# Patient Record
Sex: MALE | Race: WHITE | Employment: FULL TIME | URBAN - METROPOLITAN AREA
[De-identification: names, ages, dates, MRNs, and addresses within clinical notes are randomized per-mention and may not be internally consistent; named-entity substitution may affect disease eponyms.]

---

## 2017-03-24 ENCOUNTER — APPOINTMENT (OUTPATIENT)
Dept: CT IMAGING | Facility: HOSPITAL | Age: 57
End: 2017-03-24
Attending: EMERGENCY MEDICINE
Payer: COMMERCIAL

## 2017-03-24 ENCOUNTER — HOSPITAL ENCOUNTER (OUTPATIENT)
Facility: HOSPITAL | Age: 57
Setting detail: OBSERVATION
Discharge: HOME | End: 2017-03-26
Attending: EMERGENCY MEDICINE | Admitting: INTERNAL MEDICINE
Payer: COMMERCIAL

## 2017-03-24 ENCOUNTER — SURGERY (OUTPATIENT)
Age: 57
End: 2017-03-24

## 2017-03-24 ENCOUNTER — APPOINTMENT (OUTPATIENT)
Dept: MRI IMAGING | Facility: HOSPITAL | Age: 57
End: 2017-03-24
Attending: HOSPITALIST
Payer: COMMERCIAL

## 2017-03-24 DIAGNOSIS — K92.2 GASTROINTESTINAL HEMORRHAGE, UNSPECIFIED GASTROINTESTINAL HEMORRHAGE TYPE: Primary | ICD-10-CM

## 2017-03-24 LAB
ALBUMIN SERPL-MCNC: 3.3 G/DL (ref 3.5–4.8)
ALP LIVER SERPL-CCNC: 56 U/L (ref 45–117)
ALT SERPL-CCNC: 30 U/L (ref 17–63)
ANTIBODY SCREEN: NEGATIVE
AST SERPL-CCNC: 9 U/L (ref 15–41)
ATRIAL RATE: 104 BPM
BASOPHILS # BLD AUTO: 0.03 X10(3) UL (ref 0–0.1)
BASOPHILS NFR BLD AUTO: 0.4 %
BILIRUB SERPL-MCNC: 0.3 MG/DL (ref 0.1–2)
BUN BLD-MCNC: 64 MG/DL (ref 8–20)
CALCIUM BLD-MCNC: 8.9 MG/DL (ref 8.3–10.3)
CHLORIDE: 111 MMOL/L (ref 101–111)
CO2: 23 MMOL/L (ref 22–32)
CREAT BLD-MCNC: 1.13 MG/DL (ref 0.7–1.3)
EOSINOPHIL # BLD AUTO: 0.08 X10(3) UL (ref 0–0.3)
EOSINOPHIL NFR BLD AUTO: 1 %
ERYTHROCYTE [DISTWIDTH] IN BLOOD BY AUTOMATED COUNT: 12.5 % (ref 11.5–16)
GLUCOSE BLD-MCNC: 109 MG/DL (ref 70–99)
HCT VFR BLD AUTO: 38.1 % (ref 37–53)
HGB BLD-MCNC: 10.7 G/DL (ref 13–17)
HGB BLD-MCNC: 12.6 G/DL (ref 13–17)
IMMATURE GRANULOCYTE COUNT: 0.09 X10(3) UL (ref 0–1)
IMMATURE GRANULOCYTE RATIO %: 1.2 %
LYMPHOCYTES # BLD AUTO: 1.42 X10(3) UL (ref 0.9–4)
LYMPHOCYTES NFR BLD AUTO: 18.3 %
M PROTEIN MFR SERPL ELPH: 5.9 G/DL (ref 6.1–8.3)
MCH RBC QN AUTO: 31.3 PG (ref 27–33.2)
MCHC RBC AUTO-ENTMCNC: 33.1 G/DL (ref 31–37)
MCV RBC AUTO: 94.5 FL (ref 80–99)
MONOCYTES # BLD AUTO: 0.77 X10(3) UL (ref 0.1–0.6)
MONOCYTES NFR BLD AUTO: 9.9 %
NEUTROPHIL ABS PRELIM: 5.35 X10 (3) UL (ref 1.3–6.7)
NEUTROPHILS # BLD AUTO: 5.35 X10(3) UL (ref 1.3–6.7)
NEUTROPHILS NFR BLD AUTO: 69.2 %
P AXIS: 35 DEGREES
P-R INTERVAL: 166 MS
PLATELET # BLD AUTO: 215 10(3)UL (ref 150–450)
POTASSIUM SERPL-SCNC: 4.5 MMOL/L (ref 3.6–5.1)
Q-T INTERVAL: 330 MS
QRS DURATION: 78 MS
QTC CALCULATION (BEZET): 433 MS
R AXIS: 11 DEGREES
RBC # BLD AUTO: 4.03 X10(6)UL (ref 4.3–5.7)
RED CELL DISTRIBUTION WIDTH-SD: 43 FL (ref 35.1–46.3)
RH BLOOD TYPE: POSITIVE
SODIUM SERPL-SCNC: 143 MMOL/L (ref 136–144)
T AXIS: 88 DEGREES
VENTRICULAR RATE: 104 BPM
WBC # BLD AUTO: 7.7 X10(3) UL (ref 4–13)

## 2017-03-24 PROCEDURE — 99285 EMERGENCY DEPT VISIT HI MDM: CPT

## 2017-03-24 PROCEDURE — 86850 RBC ANTIBODY SCREEN: CPT | Performed by: EMERGENCY MEDICINE

## 2017-03-24 PROCEDURE — 96365 THER/PROPH/DIAG IV INF INIT: CPT

## 2017-03-24 PROCEDURE — C9113 INJ PANTOPRAZOLE SODIUM, VIA: HCPCS | Performed by: EMERGENCY MEDICINE

## 2017-03-24 PROCEDURE — 85025 COMPLETE CBC W/AUTO DIFF WBC: CPT | Performed by: EMERGENCY MEDICINE

## 2017-03-24 PROCEDURE — 93005 ELECTROCARDIOGRAM TRACING: CPT

## 2017-03-24 PROCEDURE — 93010 ELECTROCARDIOGRAM REPORT: CPT

## 2017-03-24 PROCEDURE — 0DJ08ZZ INSPECTION OF UPPER INTESTINAL TRACT, VIA NATURAL OR ARTIFICIAL OPENING ENDOSCOPIC: ICD-10-PCS | Performed by: INTERNAL MEDICINE

## 2017-03-24 PROCEDURE — 85018 HEMOGLOBIN: CPT | Performed by: HOSPITALIST

## 2017-03-24 PROCEDURE — C9113 INJ PANTOPRAZOLE SODIUM, VIA: HCPCS | Performed by: INTERNAL MEDICINE

## 2017-03-24 PROCEDURE — 80053 COMPREHEN METABOLIC PANEL: CPT | Performed by: EMERGENCY MEDICINE

## 2017-03-24 PROCEDURE — 86901 BLOOD TYPING SEROLOGIC RH(D): CPT | Performed by: EMERGENCY MEDICINE

## 2017-03-24 PROCEDURE — 86900 BLOOD TYPING SEROLOGIC ABO: CPT | Performed by: EMERGENCY MEDICINE

## 2017-03-24 PROCEDURE — 96361 HYDRATE IV INFUSION ADD-ON: CPT

## 2017-03-24 PROCEDURE — 74177 CT ABD & PELVIS W/CONTRAST: CPT

## 2017-03-24 PROCEDURE — 70551 MRI BRAIN STEM W/O DYE: CPT

## 2017-03-24 RX ORDER — MV,CALCIUM,MIN/IRON/FOLIC/VITK 9-200-40
2 TABLET,CHEWABLE ORAL DAILY
COMMUNITY

## 2017-03-24 RX ORDER — TROLAMINE SALICYLATE 10 G/100G
CREAM TOPICAL AS NEEDED
Status: DISCONTINUED | OUTPATIENT
Start: 2017-03-24 | End: 2017-03-26

## 2017-03-24 RX ORDER — PANTOPRAZOLE SODIUM 40 MG/1
40 TABLET, DELAYED RELEASE ORAL
Status: DISCONTINUED | OUTPATIENT
Start: 2017-03-25 | End: 2017-03-26

## 2017-03-24 RX ORDER — SODIUM CHLORIDE 9 MG/ML
INJECTION, SOLUTION INTRAVENOUS CONTINUOUS
Status: DISCONTINUED | OUTPATIENT
Start: 2017-03-24 | End: 2017-03-25

## 2017-03-24 RX ORDER — ALBUTEROL SULFATE 2.5 MG/3ML
2.5 SOLUTION RESPIRATORY (INHALATION) EVERY 4 HOURS PRN
Status: DISCONTINUED | OUTPATIENT
Start: 2017-03-24 | End: 2017-03-26

## 2017-03-24 RX ORDER — MIDAZOLAM HYDROCHLORIDE 1 MG/ML
INJECTION INTRAMUSCULAR; INTRAVENOUS
Status: DISCONTINUED | OUTPATIENT
Start: 2017-03-24 | End: 2017-03-24 | Stop reason: HOSPADM

## 2017-03-24 RX ORDER — SODIUM CHLORIDE 9 MG/ML
INJECTION, SOLUTION INTRAVENOUS CONTINUOUS
Status: DISCONTINUED | OUTPATIENT
Start: 2017-03-24 | End: 2017-03-24

## 2017-03-24 RX ORDER — AZELAIC ACID 0.15 G/G
1 GEL TOPICAL DAILY PRN
Status: DISCONTINUED | OUTPATIENT
Start: 2017-03-25 | End: 2017-03-24 | Stop reason: RX

## 2017-03-24 RX ORDER — FLUTICASONE PROPIONATE 50 MCG
2 SPRAY, SUSPENSION (ML) NASAL DAILY
Status: DISCONTINUED | OUTPATIENT
Start: 2017-03-24 | End: 2017-03-26

## 2017-03-24 RX ORDER — GARLIC EXTRACT 500 MG
1 CAPSULE ORAL DAILY
COMMUNITY
End: 2018-01-29

## 2017-03-24 NOTE — OPERATIVE REPORT
[facility] Patient Status:  Observation    1960 MRN [id number]   Location [facility]. Attending Yovany Maharaj MD   Hosp Day # 0 PCP Christopher Shannon MD         PATIENT NAME: Terrie Gaucher  DATE OF OP
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
## 2017-03-24 NOTE — ED NOTES
Attempted report .  Floor RN alexandra with another pt at this time requesting a call back in 10 min

## 2017-03-24 NOTE — ED PROVIDER NOTES
Patient Seen in: [facility] Emergency Department    History   Patient presents with:  GI Bleeding (gastrointestinal)    Stated Complaint: VOMITTING BLOOD  X1    HPI    43-year-old male that comes the hospital the chief complaint of having difficulty Comment Performed by Zackary Harper at [address] [place]      Comment 10/06    DEBRIDE MASTOID CAVITY,SIMPLE      REPAIR NASAL STENOSIS  1/11/2011    Comment Performed by Vy Abad at [facility] sprays by Nasal route daily. Albuterol Sulfate (2.5 MG/3ML) 0.083% Inhalation Nebu Soln,  Take 3 mL (2.5 mg total) by nebulization every 4 (four) hours as needed for Wheezing or Shortness of Breath.    allopurinol 100 MG Oral Tab,  Take 2 tablets (200 mg Exam       ED Triage Vitals   BP 03/24/17 0457 106/56 mmHg   Pulse 03/24/17 0457 108   Resp 03/24/17 0457 18   Temp 03/24/17 0457 97.1 °F (36.2 °C)   Temp src 03/24/17 0457 Temporal   SpO2 03/24/17 0457 99 %   O2 Device 03/24/17 0457 None (Room air) ---------                               -----------         ------                     BLOOD TYPE, ABO AND RH D[[id number]]                         Final result               ANTIBODY [id number]]                                  Final resul

## 2017-03-24 NOTE — H&P
[facility] Hospitalist H&P       CC: Patient presents with:  GI Bleeding (gastrointestinal)       PCP: Yoana Calderon MD    History of Present Illness:  Patient is a 59year old male with PMH sig for GERD, HTN, JAGRUTI, and previous gastric bypass 2006 presente TURBINATE,SUBMUCOUS  6/23/10    Comment Performed by Anjel Gallego at [address] [place]      Comment 10/06    DEBRIDE MASTOID CAVITY,SIMPLE      REPAIR NASAL STENOSIS  1/11/2011    Comment Performed by Kenya Arajuo 1 TABLET BY MOUTH DAILY. Disp: 90 tablet Rfl: 3   Diclofenac Sodium 75 MG Oral Tab EC Take one tablet by mouth two times per day after meals.  Disp: 60 tablet Rfl: 1   Respiratory Therapy Supplies (NEBULIZER/TUBING/MOUTHPIECE) Does not apply Kit As directed IRON OR 1 tablet daily Disp:  Rfl:        Scheduled Medication:  • pantoprazole (PROTONIX) IV push  40 mg Intravenous Q12H   • Fluticasone Propionate  2 spray Each Nare Daily     Continuous Infusing Medication:  • sodium chloride 125 mL/hr at 03/24/17 11 COMPARISON:  None. INDICATIONS:  VOMITTING BLOOD  X1  TECHNIQUE:  CT scanning was performed from the dome of the diaphragm to the pubic symphysis with non-ionic intravenous contrast material. Post contrast coronal MIP imaging was performed.   Dose reducti intra-abdominal/pelvic process identified. 2. Uncomplicated colonic diverticulosis. 3. Indeterminate left adrenal mass measuring up to 3.9 x 3.4 cm could represent an adenoma, with other etiologies not excluded. Dictated by:  Aidan Hurtado MD on 3/24/

## 2017-03-25 PROBLEM — F07.81 POSTCONCUSSION SYNDROME: Status: ACTIVE | Noted: 2017-03-25

## 2017-03-25 LAB
BASOPHILS # BLD AUTO: 0.03 X10(3) UL (ref 0–0.1)
BASOPHILS NFR BLD AUTO: 0.6 %
BUN BLD-MCNC: 42 MG/DL (ref 8–20)
CALCIUM BLD-MCNC: 8.2 MG/DL (ref 8.3–10.3)
CHLORIDE: 115 MMOL/L (ref 101–111)
CO2: 23 MMOL/L (ref 22–32)
CREAT BLD-MCNC: 1.04 MG/DL (ref 0.7–1.3)
EOSINOPHIL # BLD AUTO: 0.1 X10(3) UL (ref 0–0.3)
EOSINOPHIL NFR BLD AUTO: 2 %
ERYTHROCYTE [DISTWIDTH] IN BLOOD BY AUTOMATED COUNT: 12.8 % (ref 11.5–16)
GLUCOSE BLD-MCNC: 102 MG/DL (ref 70–99)
GLUCOSE BLD-MCNC: 124 MG/DL (ref 65–99)
HCT VFR BLD AUTO: 29.9 % (ref 37–53)
HGB BLD-MCNC: 9.4 G/DL (ref 13–17)
HGB BLD-MCNC: 9.7 G/DL (ref 13–17)
IMMATURE GRANULOCYTE COUNT: 0.05 X10(3) UL (ref 0–1)
IMMATURE GRANULOCYTE RATIO %: 1 %
LYMPHOCYTES # BLD AUTO: 1.1 X10(3) UL (ref 0.9–4)
LYMPHOCYTES NFR BLD AUTO: 22 %
MCH RBC QN AUTO: 31.1 PG (ref 27–33.2)
MCHC RBC AUTO-ENTMCNC: 32.4 G/DL (ref 31–37)
MCV RBC AUTO: 95.8 FL (ref 80–99)
MONOCYTES # BLD AUTO: 0.44 X10(3) UL (ref 0.1–0.6)
MONOCYTES NFR BLD AUTO: 8.8 %
NEUTROPHIL ABS PRELIM: 3.27 X10 (3) UL (ref 1.3–6.7)
NEUTROPHILS # BLD AUTO: 3.27 X10(3) UL (ref 1.3–6.7)
NEUTROPHILS NFR BLD AUTO: 65.6 %
PLATELET # BLD AUTO: 147 10(3)UL (ref 150–450)
POTASSIUM SERPL-SCNC: 3.9 MMOL/L (ref 3.6–5.1)
RBC # BLD AUTO: 3.12 X10(6)UL (ref 4.3–5.7)
RED CELL DISTRIBUTION WIDTH-SD: 43.8 FL (ref 35.1–46.3)
SODIUM SERPL-SCNC: 145 MMOL/L (ref 136–144)
WBC # BLD AUTO: 5 X10(3) UL (ref 4–13)

## 2017-03-25 PROCEDURE — 80048 BASIC METABOLIC PNL TOTAL CA: CPT | Performed by: HOSPITALIST

## 2017-03-25 PROCEDURE — 85018 HEMOGLOBIN: CPT | Performed by: INTERNAL MEDICINE

## 2017-03-25 PROCEDURE — 94660 CPAP INITIATION&MGMT: CPT

## 2017-03-25 PROCEDURE — 85025 COMPLETE CBC W/AUTO DIFF WBC: CPT | Performed by: HOSPITALIST

## 2017-03-25 PROCEDURE — 82962 GLUCOSE BLOOD TEST: CPT

## 2017-03-25 RX ORDER — LOSARTAN POTASSIUM 100 MG/1
100 TABLET ORAL
Status: DISCONTINUED | OUTPATIENT
Start: 2017-03-25 | End: 2017-03-26

## 2017-03-25 RX ORDER — MONTELUKAST SODIUM 10 MG/1
10 TABLET ORAL
Status: DISCONTINUED | OUTPATIENT
Start: 2017-03-25 | End: 2017-03-26

## 2017-03-25 RX ORDER — AMLODIPINE BESYLATE 5 MG/1
10 TABLET ORAL
Status: DISCONTINUED | OUTPATIENT
Start: 2017-03-25 | End: 2017-03-26

## 2017-03-25 RX ORDER — ALLOPURINOL 100 MG/1
200 TABLET ORAL DAILY
Status: DISCONTINUED | OUTPATIENT
Start: 2017-03-25 | End: 2017-03-26
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
## 2017-03-25 NOTE — PLAN OF CARE
CARDIOVASCULAR - ADULT    • Maintains optimal cardiac output and hemodynamic stability Progressing    • Absence of cardiac arrhythmias or at baseline Progressing        Pt. Stable on Monitor. NSR. BP elevated this am. Repeated BP and improved at 127/75.

## 2017-03-25 NOTE — RESPIRATORY THERAPY NOTE
JAGRUTI - Equipment Use Daily Summary:  · Set Mode CFLEX  · Usage in hours: 5:42  · 90% Pressure (EPAP) level: 8.5  · 90% Insp Pressure (IPAP):   · AHI: 4.7  · Supplemental Oxygen:   · Comments:

## 2017-03-25 NOTE — PLAN OF CARE
Dr Sharyle Masse paged this am about drop in HGb to 9.7. Also let  him know he had 2 black stools. No new orders.

## 2017-03-25 NOTE — PLAN OF CARE
2030 HGB 10.7. Per Bonny Castleman day RN  had 2 dk stools on days. HGB 10.7 now. Jonny regular diet. VSS Will monitor HGB in am for further drop. 2100 returned from MRI. It is normal.Dr Sanchez aware of MRI results when paged.  Patient denies nausea or blurred vision

## 2017-03-25 NOTE — PROGRESS NOTES
[facility]  Progress Note    Ramilalogan Pete Patient Status:  Observation    1960 MRN [id number]   [facility] 4NW-A Attending Miguel A Chairez, [facility] Day # 1 PCP Yoana Calderon MD     Subjective:  Lakesha Freeman is Gastrointestinal hemorrhage, unspecified gastrointestinal hemorrhage type       Merline Peach is a(n) 59year old male. Pt with hematemesis gastro-jejunal; bleed. Had melena yesterday. Hb drop to be expected given degree of bleed.   Fo      Plan:    1

## 2017-03-25 NOTE — PAYOR COMM NOTE
Attending Physician: Lucía Owens, *    Review Type: ADMISSION   Kalpesh Benites       Date: March 25, 2017 - 2:51 PM  Payor: [address] [place] [address] Number: [id number]  Admit date: 3/24/2017  4:49 AM   Admitted from Emergency Dept (NORVASC) tab 10 mg     Date Action Dose Route User    3/25/2017 1248 Given 10 mg Oral Kenya Doyle, RN      fentaNYL citrate (SUBLIMAZE) 0.05 MG/ML injection     Date Action Dose Route User    3/24/2017 1551 Given 50 mcg Intravenous Patrice Harris

## 2017-03-25 NOTE — CONSULTS
[facility]  Neurology  Consultation Report  Jaleel Long     1960 MRN [id number]   [facility] 4NW-A PCP Burke Merrill MD      Date of Consult:  2017     Referring Physician: Edmundo Lucero    History of Presen Medical History:  Past Medical History   Diagnosis Date   • Allergic rhinitis, cause unspecified    • Esophageal reflux    • Unspecified essential hypertension    • Personal history of other specified diseases      rosacea   • OBESITY      Gastric bypass 2 LES SC > 3 CM Right 7/2/2014    Comment Procedure: EXCISION OF ARM MASS;  Surgeon: Rayna Sosa MD;  Location: [address] [place] [address]    COLONOSCOPY N/A 7/16/2015    Comment Procedure: COLONOSCOPY;  Surgeon: Abdiel Edwards MD;  Location: [facility] ENDOSCOPY wheezing  CARDIOVASCULAR: denies chest pain on exertion; no palpitations  GI: As above  : no complaint of urinary incontinence  PSYCH: denies depression or anxiety  HEMATOLOGIC: Anemia  ENDOCRINE: denies thyroid history  ALL/ASTHMA: denies hx of allergy °C) (Oral)  Resp 20  Ht 5' 10\" (1.778 m)  Wt 303 lb 3.2 oz (137.531 kg)  BMI 43.50 kg/m2  SpO2 99%   General: The patient appears attentive, bright and well composed. Head: Normocephalic, atraumatic. Eyes: anicteric  ENT: normal tongue, normal mucosa. D deficiency     Fatigue     Nystagmus associated with disorders of the vestibular system     Streptococcal pharyngitis     Allergic rhinitis - tree, weed, ragweed, cat (AIT start 7/2012)     Chronic sinusitis     Allergic rhinitis due to pollen     Allerg

## 2017-03-26 VITALS
SYSTOLIC BLOOD PRESSURE: 140 MMHG | TEMPERATURE: 98 F | BODY MASS INDEX: 43.4 KG/M2 | DIASTOLIC BLOOD PRESSURE: 87 MMHG | HEART RATE: 75 BPM | OXYGEN SATURATION: 100 % | WEIGHT: 303.19 LBS | HEIGHT: 70 IN | RESPIRATION RATE: 20 BRPM

## 2017-03-26 LAB
BASOPHILS # BLD AUTO: 0.03 X10(3) UL (ref 0–0.1)
BASOPHILS NFR BLD AUTO: 0.6 %
EOSINOPHIL # BLD AUTO: 0.13 X10(3) UL (ref 0–0.3)
EOSINOPHIL NFR BLD AUTO: 2.8 %
ERYTHROCYTE [DISTWIDTH] IN BLOOD BY AUTOMATED COUNT: 12.8 % (ref 11.5–16)
HCT VFR BLD AUTO: 27.6 % (ref 37–53)
HGB BLD-MCNC: 9.1 G/DL (ref 13–17)
IMMATURE GRANULOCYTE COUNT: 0.05 X10(3) UL (ref 0–1)
IMMATURE GRANULOCYTE RATIO %: 1.1 %
LYMPHOCYTES # BLD AUTO: 1.22 X10(3) UL (ref 0.9–4)
LYMPHOCYTES NFR BLD AUTO: 26 %
MCH RBC QN AUTO: 31.3 PG (ref 27–33.2)
MCHC RBC AUTO-ENTMCNC: 33 G/DL (ref 31–37)
MCV RBC AUTO: 94.8 FL (ref 80–99)
MONOCYTES # BLD AUTO: 0.39 X10(3) UL (ref 0.1–0.6)
MONOCYTES NFR BLD AUTO: 8.3 %
NEUTROPHIL ABS PRELIM: 2.88 X10 (3) UL (ref 1.3–6.7)
NEUTROPHILS # BLD AUTO: 2.88 X10(3) UL (ref 1.3–6.7)
NEUTROPHILS NFR BLD AUTO: 61.2 %
PLATELET # BLD AUTO: 143 10(3)UL (ref 150–450)
RBC # BLD AUTO: 2.91 X10(6)UL (ref 4.3–5.7)
RED CELL DISTRIBUTION WIDTH-SD: 44.1 FL (ref 35.1–46.3)
WBC # BLD AUTO: 4.7 X10(3) UL (ref 4–13)

## 2017-03-26 PROCEDURE — 85018 HEMOGLOBIN: CPT | Performed by: INTERNAL MEDICINE

## 2017-03-26 PROCEDURE — 85025 COMPLETE CBC W/AUTO DIFF WBC: CPT | Performed by: HOSPITALIST

## 2017-03-26 RX ORDER — PANTOPRAZOLE SODIUM 40 MG/1
40 TABLET, DELAYED RELEASE ORAL
Qty: 30 TABLET | Refills: 12 | Status: SHIPPED | OUTPATIENT
Start: 2017-03-26 | End: 2018-01-29

## 2017-03-26 NOTE — PLAN OF CARE
NEUROLOGICAL - ADULT    • Achieves stable or improved neurological status Progressing          GASTROINTESTINAL - ADULT    • Maintains or returns to baseline bowel function Progressing          CARDIOVASCULAR - ADULT    • Maintains optimal cardiac output a

## 2017-03-26 NOTE — DISCHARGE SUMMARY
General Medicine Discharge Summary     Patient ID:  Sergei Jaramillo  59year old  [id number]  1/5/1960    Admit date: 3/24/2017    Discharge date and time: 3/26/2017  3:31 PM     Attending Physician: Catina Sterling MD    Primary Care Physician: Fouzia Chen diet-currently on general. D/c IVF    #acute anemia secondary to GI bleed-  Expected downtrend. HDS, monitor for stabilization. Currently not tachycardic.     # Sleepiness, intermittent vision changes, nausea-improved; suspect post-concussion syndrome  - no (er)    3/24/2017  PROCEDURE:  CT ABDOMEN PELVIS IV CONTRAST, NO ORAL (ER)  COMPARISON:  None.   INDICATIONS:  VOMITTING BLOOD  X1  TECHNIQUE:  CT scanning was performed from the dome of the diaphragm to the pubic symphysis with non-ionic intravenous contra spine. OTHER:  None. 3/24/2017  CONCLUSION:   1. No acute intra-abdominal/pelvic process identified. 2. Uncomplicated colonic diverticulosis.   3. Indeterminate left adrenal mass measuring up to 3.9 x 3.4 cm could represent an adenoma, with other etio Inhalation Nebu Soln  Take 3 mL (2.5 mg total) by nebulization every 4 (four) hours as needed for Wheezing or Shortness of Breath., Normal, Disp-3 Box, R-0Or disp #3 boxes with #1 refill for mail order and/or if insurance permits.     allopurinol 100 MG Ora appointment as soon as possible for a visit in 2 weeks.     Specialty: Internal Medicine     Contact information:     [place]  [phone]                  Total Time Coordinating Care: Greater than 30 minutes

## 2017-03-26 NOTE — PAYOR COMM NOTE
Attending Physician: Denise Tatum, *      3/25    CONTINUED STAY    PT WITH MELENA TILL AST NITE  MORE ALERT TODAY    HG DOWN TO 9.7      HEMATEMESIS GASTRO-JEJUNAL BLEED  ANEMIA 2ND TO GI BLEED      PLAN  FOLLOW LABS/ TREND HGB  CONT PROTONIX  A

## 2017-03-26 NOTE — RESPIRATORY THERAPY NOTE
JAGRUTI - Equipment Use Daily Summary:  · Set Mode CFLEX  · Usage in hours: 7:18  · 90% Pressure (EPAP) level: 11.2  · 90% Insp Pressure (IPAP):   · AHI: 4.3  · Supplemental Oxygen:   · Comments:

## 2017-03-26 NOTE — PLAN OF CARE
GASTROINTESTINAL - ADULT    • Minimal or absence of nausea and vomiting Adequate for Discharge    • Maintains or returns to baseline bowel function Adequate for Discharge    • Achieves appropriate nutritional intake (bariatric) Adequate for Discharge
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
## 2017-03-26 NOTE — PROGRESS NOTES
[facility]  Progress Note    Delynn Coma Patient Status:  Observation    1960 MRN [id number]   [facility] 4NW-A Attending Tejas Justice, [facility] Day # 2 PCP Norman Yeboah MD     Subjective:  Delynn Coma is drift down but no BMs. No evidence of further bleeding      Plan:    1. Cont Protonix. 2.  OK to D/C  3.   No ASA, NSAIDS x 10 days      Dorrine Distance  3/26/2017  11:29 AM

## 2017-04-03 ENCOUNTER — APPOINTMENT (OUTPATIENT)
Dept: GENERAL RADIOLOGY | Facility: HOSPITAL | Age: 57
End: 2017-04-03
Attending: EMERGENCY MEDICINE
Payer: OTHER MISCELLANEOUS

## 2017-04-03 ENCOUNTER — HOSPITAL ENCOUNTER (EMERGENCY)
Facility: HOSPITAL | Age: 57
Discharge: HOME | End: 2017-04-03
Attending: EMERGENCY MEDICINE
Payer: OTHER MISCELLANEOUS

## 2017-04-03 ENCOUNTER — APPOINTMENT (OUTPATIENT)
Dept: CT IMAGING | Facility: HOSPITAL | Age: 57
End: 2017-04-03
Attending: EMERGENCY MEDICINE
Payer: OTHER MISCELLANEOUS

## 2017-04-03 VITALS
HEART RATE: 73 BPM | DIASTOLIC BLOOD PRESSURE: 101 MMHG | HEIGHT: 71 IN | RESPIRATION RATE: 16 BRPM | TEMPERATURE: 98 F | OXYGEN SATURATION: 96 % | SYSTOLIC BLOOD PRESSURE: 145 MMHG | BODY MASS INDEX: 41.72 KG/M2 | WEIGHT: 298 LBS

## 2017-04-03 DIAGNOSIS — R53.83 FATIGUE, UNSPECIFIED TYPE: Primary | ICD-10-CM

## 2017-04-03 DIAGNOSIS — S09.90XA HEAD INJURY, INITIAL ENCOUNTER: ICD-10-CM

## 2017-04-03 PROCEDURE — 93010 ELECTROCARDIOGRAM REPORT: CPT

## 2017-04-03 PROCEDURE — 80053 COMPREHEN METABOLIC PANEL: CPT | Performed by: EMERGENCY MEDICINE

## 2017-04-03 PROCEDURE — 85025 COMPLETE CBC W/AUTO DIFF WBC: CPT | Performed by: EMERGENCY MEDICINE

## 2017-04-03 PROCEDURE — 87086 URINE CULTURE/COLONY COUNT: CPT | Performed by: EMERGENCY MEDICINE

## 2017-04-03 PROCEDURE — 93005 ELECTROCARDIOGRAM TRACING: CPT

## 2017-04-03 PROCEDURE — 96360 HYDRATION IV INFUSION INIT: CPT

## 2017-04-03 PROCEDURE — 70450 CT HEAD/BRAIN W/O DYE: CPT

## 2017-04-03 PROCEDURE — 71020 XR CHEST PA + LAT CHEST (CPT=71020): CPT

## 2017-04-03 PROCEDURE — 81001 URINALYSIS AUTO W/SCOPE: CPT | Performed by: EMERGENCY MEDICINE

## 2017-04-03 PROCEDURE — 84484 ASSAY OF TROPONIN QUANT: CPT | Performed by: EMERGENCY MEDICINE

## 2017-04-03 PROCEDURE — 99284 EMERGENCY DEPT VISIT MOD MDM: CPT

## 2017-04-03 PROCEDURE — 99285 EMERGENCY DEPT VISIT HI MDM: CPT

## 2017-04-03 PROCEDURE — 96361 HYDRATE IV INFUSION ADD-ON: CPT
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
## 2017-04-04 PROBLEM — R41.9 COGNITIVE COMPLAINTS WITH NORMAL EXAM: Status: ACTIVE | Noted: 2017-04-04

## 2017-04-04 PROBLEM — R45.89 TEARFULNESS: Status: ACTIVE | Noted: 2017-04-04

## 2017-04-04 PROBLEM — G45.4 TRANSIENT GLOBAL AMNESIA: Status: ACTIVE | Noted: 2017-04-04

## 2017-04-04 PROBLEM — R68.89 FORGETFULNESS: Status: ACTIVE | Noted: 2017-04-04

## 2017-04-04 PROCEDURE — 84153 ASSAY OF PSA TOTAL: CPT | Performed by: UROLOGY

## 2017-04-04 PROCEDURE — 36415 COLL VENOUS BLD VENIPUNCTURE: CPT | Performed by: UROLOGY

## 2017-04-04 PROCEDURE — 84154 ASSAY OF PSA FREE: CPT | Performed by: UROLOGY

## 2017-04-17 PROCEDURE — 84153 ASSAY OF PSA TOTAL: CPT | Performed by: UROLOGY

## 2017-04-17 PROCEDURE — 36415 COLL VENOUS BLD VENIPUNCTURE: CPT | Performed by: UROLOGY

## 2017-07-05 PROCEDURE — 84153 ASSAY OF PSA TOTAL: CPT | Performed by: UROLOGY

## 2017-07-05 PROCEDURE — 36415 COLL VENOUS BLD VENIPUNCTURE: CPT | Performed by: UROLOGY

## 2017-07-05 PROCEDURE — 84154 ASSAY OF PSA FREE: CPT | Performed by: UROLOGY

## 2017-10-25 PROCEDURE — 87077 CULTURE AEROBIC IDENTIFY: CPT | Performed by: EMERGENCY MEDICINE

## 2017-10-25 PROCEDURE — 87086 URINE CULTURE/COLONY COUNT: CPT | Performed by: EMERGENCY MEDICINE

## 2017-10-31 PROBLEM — M51.36 DDD (DEGENERATIVE DISC DISEASE), LUMBAR: Status: ACTIVE | Noted: 2017-10-31

## 2017-10-31 PROBLEM — M51.369 DDD (DEGENERATIVE DISC DISEASE), LUMBAR: Status: ACTIVE | Noted: 2017-10-31

## 2017-10-31 PROBLEM — M47.816 LUMBAR SPONDYLOSIS: Status: ACTIVE | Noted: 2017-10-31

## 2017-10-31 PROBLEM — M54.16 LUMBAR RADICULITIS: Status: ACTIVE | Noted: 2017-10-31

## 2017-11-16 PROBLEM — M51.26 HNP (HERNIATED NUCLEUS PULPOSUS), LUMBAR: Status: ACTIVE | Noted: 2017-11-16

## 2017-12-01 PROBLEM — M41.80 DEGENERATIVE SCOLIOSIS: Status: ACTIVE | Noted: 2017-12-01

## 2017-12-01 PROBLEM — M41.50 DEGENERATIVE SCOLIOSIS: Status: ACTIVE | Noted: 2017-12-01

## 2017-12-01 PROBLEM — M51.16 LUMBAR DISC HERNIATION WITH RADICULOPATHY: Status: ACTIVE | Noted: 2017-12-01

## 2018-01-24 PROCEDURE — 81001 URINALYSIS AUTO W/SCOPE: CPT | Performed by: INTERNAL MEDICINE

## 2018-01-24 PROCEDURE — 87081 CULTURE SCREEN ONLY: CPT | Performed by: INTERNAL MEDICINE

## 2018-01-24 PROCEDURE — 87147 CULTURE TYPE IMMUNOLOGIC: CPT | Performed by: INTERNAL MEDICINE

## 2018-01-29 RX ORDER — ASPIRIN 81 MG/1
81 TABLET ORAL DAILY
COMMUNITY
End: 2018-02-01

## 2018-01-31 PROCEDURE — 36415 COLL VENOUS BLD VENIPUNCTURE: CPT | Performed by: UROLOGY

## 2018-01-31 PROCEDURE — 84153 ASSAY OF PSA TOTAL: CPT | Performed by: UROLOGY

## 2018-02-01 ENCOUNTER — SURGERY (OUTPATIENT)
Age: 58
End: 2018-02-01

## 2018-02-01 ENCOUNTER — ANESTHESIA EVENT (OUTPATIENT)
Dept: SURGERY | Facility: HOSPITAL | Age: 58
End: 2018-02-01
Payer: COMMERCIAL

## 2018-02-01 ENCOUNTER — APPOINTMENT (OUTPATIENT)
Dept: GENERAL RADIOLOGY | Facility: HOSPITAL | Age: 58
End: 2018-02-01
Attending: ORTHOPAEDIC SURGERY
Payer: COMMERCIAL

## 2018-02-01 ENCOUNTER — ANESTHESIA (OUTPATIENT)
Dept: SURGERY | Facility: HOSPITAL | Age: 58
End: 2018-02-01
Payer: COMMERCIAL

## 2018-02-01 ENCOUNTER — HOSPITAL ENCOUNTER (OUTPATIENT)
Facility: HOSPITAL | Age: 58
Setting detail: HOSPITAL OUTPATIENT SURGERY
Discharge: HOME | End: 2018-02-01
Attending: ORTHOPAEDIC SURGERY | Admitting: ORTHOPAEDIC SURGERY
Payer: COMMERCIAL

## 2018-02-01 VITALS
SYSTOLIC BLOOD PRESSURE: 136 MMHG | DIASTOLIC BLOOD PRESSURE: 81 MMHG | WEIGHT: 305 LBS | RESPIRATION RATE: 17 BRPM | OXYGEN SATURATION: 96 % | HEIGHT: 70 IN | HEART RATE: 68 BPM | TEMPERATURE: 98 F | BODY MASS INDEX: 43.67 KG/M2

## 2018-02-01 DIAGNOSIS — M51.16 LUMBAR DISC HERNIATION WITH RADICULOPATHY: ICD-10-CM

## 2018-02-01 DIAGNOSIS — M41.80 DEGENERATIVE SCOLIOSIS: ICD-10-CM

## 2018-02-01 DIAGNOSIS — M51.26 HNP (HERNIATED NUCLEUS PULPOSUS), LUMBAR: ICD-10-CM

## 2018-02-01 LAB — APTT PPP: 27.5 SECONDS (ref 23.2–35.3)

## 2018-02-01 PROCEDURE — 01NB0ZZ RELEASE LUMBAR NERVE, OPEN APPROACH: ICD-10-PCS | Performed by: ORTHOPAEDIC SURGERY

## 2018-02-01 PROCEDURE — 88311 DECALCIFY TISSUE: CPT | Performed by: ORTHOPAEDIC SURGERY

## 2018-02-01 PROCEDURE — 76001 XR C-ARM FLUORO >1 HOUR  (CPT=76001): CPT | Performed by: ORTHOPAEDIC SURGERY

## 2018-02-01 PROCEDURE — 36415 COLL VENOUS BLD VENIPUNCTURE: CPT | Performed by: ORTHOPAEDIC SURGERY

## 2018-02-01 PROCEDURE — 88304 TISSUE EXAM BY PATHOLOGIST: CPT | Performed by: ORTHOPAEDIC SURGERY

## 2018-02-01 PROCEDURE — 0SB20ZZ EXCISION OF LUMBAR VERTEBRAL DISC, OPEN APPROACH: ICD-10-PCS | Performed by: ORTHOPAEDIC SURGERY

## 2018-02-01 PROCEDURE — 72100 X-RAY EXAM L-S SPINE 2/3 VWS: CPT | Performed by: ORTHOPAEDIC SURGERY

## 2018-02-01 PROCEDURE — 85730 THROMBOPLASTIN TIME PARTIAL: CPT | Performed by: ORTHOPAEDIC SURGERY

## 2018-02-01 RX ORDER — HYDRALAZINE HYDROCHLORIDE 20 MG/ML
INJECTION INTRAMUSCULAR; INTRAVENOUS AS NEEDED
Status: DISCONTINUED | OUTPATIENT
Start: 2018-02-01 | End: 2018-02-01 | Stop reason: SURG

## 2018-02-01 RX ORDER — ACETAMINOPHEN 500 MG
1000 TABLET ORAL ONCE
Status: COMPLETED | OUTPATIENT
Start: 2018-02-01 | End: 2018-02-01

## 2018-02-01 RX ORDER — SODIUM CHLORIDE, SODIUM LACTATE, POTASSIUM CHLORIDE, CALCIUM CHLORIDE 600; 310; 30; 20 MG/100ML; MG/100ML; MG/100ML; MG/100ML
INJECTION, SOLUTION INTRAVENOUS CONTINUOUS
Status: DISCONTINUED | OUTPATIENT
Start: 2018-02-01 | End: 2018-02-01

## 2018-02-01 RX ORDER — HALOPERIDOL 5 MG/ML
0.25 INJECTION INTRAMUSCULAR ONCE AS NEEDED
Status: DISCONTINUED | OUTPATIENT
Start: 2018-02-01 | End: 2018-02-01

## 2018-02-01 RX ORDER — DEXAMETHASONE SODIUM PHOSPHATE 4 MG/ML
VIAL (ML) INJECTION AS NEEDED
Status: DISCONTINUED | OUTPATIENT
Start: 2018-02-01 | End: 2018-02-01 | Stop reason: SURG

## 2018-02-01 RX ORDER — MAGNESIUM HYDROXIDE 1200 MG/15ML
LIQUID ORAL CONTINUOUS PRN
Status: DISCONTINUED | OUTPATIENT
Start: 2018-02-01 | End: 2018-02-01

## 2018-02-01 RX ORDER — NALOXONE HYDROCHLORIDE 0.4 MG/ML
80 INJECTION, SOLUTION INTRAMUSCULAR; INTRAVENOUS; SUBCUTANEOUS AS NEEDED
Status: DISCONTINUED | OUTPATIENT
Start: 2018-02-01 | End: 2018-02-01

## 2018-02-01 RX ORDER — MIDAZOLAM HYDROCHLORIDE 1 MG/ML
INJECTION INTRAMUSCULAR; INTRAVENOUS AS NEEDED
Status: DISCONTINUED | OUTPATIENT
Start: 2018-02-01 | End: 2018-02-01 | Stop reason: SURG

## 2018-02-01 RX ORDER — METOCLOPRAMIDE 10 MG/1
10 TABLET ORAL ONCE
Status: COMPLETED | OUTPATIENT
Start: 2018-02-01 | End: 2018-02-01

## 2018-02-01 RX ORDER — ONDANSETRON 2 MG/ML
INJECTION INTRAMUSCULAR; INTRAVENOUS AS NEEDED
Status: DISCONTINUED | OUTPATIENT
Start: 2018-02-01 | End: 2018-02-01 | Stop reason: SURG

## 2018-02-01 RX ORDER — HYDROCODONE BITARTRATE AND ACETAMINOPHEN 5; 325 MG/1; MG/1
1 TABLET ORAL AS NEEDED
Status: COMPLETED | OUTPATIENT
Start: 2018-02-01 | End: 2018-02-01

## 2018-02-01 RX ORDER — MORPHINE SULFATE 2 MG/ML
2 INJECTION, SOLUTION INTRAMUSCULAR; INTRAVENOUS EVERY 10 MIN PRN
Status: DISCONTINUED | OUTPATIENT
Start: 2018-02-01 | End: 2018-02-01

## 2018-02-01 RX ORDER — DOCUSATE SODIUM 100 MG/1
100 CAPSULE, LIQUID FILLED ORAL 2 TIMES DAILY
Qty: 40 CAPSULE | Refills: 0 | Status: SHIPPED | OUTPATIENT
Start: 2018-02-01 | End: 2018-02-11

## 2018-02-01 RX ORDER — HYDROMORPHONE HYDROCHLORIDE 1 MG/ML
0.2 INJECTION, SOLUTION INTRAMUSCULAR; INTRAVENOUS; SUBCUTANEOUS EVERY 5 MIN PRN
Status: DISCONTINUED | OUTPATIENT
Start: 2018-02-01 | End: 2018-02-01

## 2018-02-01 RX ORDER — MORPHINE SULFATE 10 MG/ML
6 INJECTION, SOLUTION INTRAMUSCULAR; INTRAVENOUS EVERY 10 MIN PRN
Status: DISCONTINUED | OUTPATIENT
Start: 2018-02-01 | End: 2018-02-01

## 2018-02-01 RX ORDER — ONDANSETRON 2 MG/ML
4 INJECTION INTRAMUSCULAR; INTRAVENOUS ONCE AS NEEDED
Status: DISCONTINUED | OUTPATIENT
Start: 2018-02-01 | End: 2018-02-01

## 2018-02-01 RX ORDER — HYDROMORPHONE HYDROCHLORIDE 1 MG/ML
0.4 INJECTION, SOLUTION INTRAMUSCULAR; INTRAVENOUS; SUBCUTANEOUS EVERY 5 MIN PRN
Status: DISCONTINUED | OUTPATIENT
Start: 2018-02-01 | End: 2018-02-01

## 2018-02-01 RX ORDER — HYDROCODONE BITARTRATE AND ACETAMINOPHEN 10; 325 MG/1; MG/1
1 TABLET ORAL EVERY 4 HOURS PRN
Qty: 50 TABLET | Refills: 0 | Status: SHIPPED | OUTPATIENT
Start: 2018-02-01 | End: 2018-05-02

## 2018-02-01 RX ORDER — BUPIVACAINE HYDROCHLORIDE 2.5 MG/ML
INJECTION, SOLUTION EPIDURAL; INFILTRATION; INTRACAUDAL AS NEEDED
Status: DISCONTINUED | OUTPATIENT
Start: 2018-02-01 | End: 2018-02-01 | Stop reason: HOSPADM

## 2018-02-01 RX ORDER — FAMOTIDINE 20 MG/1
20 TABLET ORAL ONCE
Status: COMPLETED | OUTPATIENT
Start: 2018-02-01 | End: 2018-02-01

## 2018-02-01 RX ORDER — HYDROMORPHONE HYDROCHLORIDE 1 MG/ML
0.6 INJECTION, SOLUTION INTRAMUSCULAR; INTRAVENOUS; SUBCUTANEOUS EVERY 5 MIN PRN
Status: DISCONTINUED | OUTPATIENT
Start: 2018-02-01 | End: 2018-02-01

## 2018-02-01 RX ORDER — MORPHINE SULFATE 4 MG/ML
4 INJECTION, SOLUTION INTRAMUSCULAR; INTRAVENOUS EVERY 10 MIN PRN
Status: DISCONTINUED | OUTPATIENT
Start: 2018-02-01 | End: 2018-02-01

## 2018-02-01 RX ORDER — ROCURONIUM BROMIDE 10 MG/ML
INJECTION, SOLUTION INTRAVENOUS AS NEEDED
Status: DISCONTINUED | OUTPATIENT
Start: 2018-02-01 | End: 2018-02-01 | Stop reason: SURG

## 2018-02-01 RX ORDER — LIDOCAINE HYDROCHLORIDE 40 MG/ML
SOLUTION TOPICAL AS NEEDED
Status: DISCONTINUED | OUTPATIENT
Start: 2018-02-01 | End: 2018-02-01 | Stop reason: SURG

## 2018-02-01 RX ORDER — HYDROCODONE BITARTRATE AND ACETAMINOPHEN 5; 325 MG/1; MG/1
2 TABLET ORAL AS NEEDED
Status: COMPLETED | OUTPATIENT
Start: 2018-02-01 | End: 2018-02-01

## 2018-02-01 RX ORDER — CEFAZOLIN SODIUM 1 G/3ML
INJECTION, POWDER, FOR SOLUTION INTRAMUSCULAR; INTRAVENOUS AS NEEDED
Status: DISCONTINUED | OUTPATIENT
Start: 2018-02-01 | End: 2018-02-01 | Stop reason: SURG

## 2018-02-01 RX ORDER — TIZANIDINE 4 MG/1
4 TABLET ORAL EVERY 8 HOURS PRN
Qty: 50 TABLET | Refills: 0 | Status: SHIPPED | OUTPATIENT
Start: 2018-02-01 | End: 2018-06-22

## 2018-02-01 RX ADMIN — HYDRALAZINE HYDROCHLORIDE 4 MG: 20 INJECTION INTRAMUSCULAR; INTRAVENOUS at 09:59:00

## 2018-02-01 RX ADMIN — ONDANSETRON 4 MG: 2 INJECTION INTRAMUSCULAR; INTRAVENOUS at 10:29:00

## 2018-02-01 RX ADMIN — ROCURONIUM BROMIDE 50 MG: 10 INJECTION, SOLUTION INTRAVENOUS at 08:58:00

## 2018-02-01 RX ADMIN — LIDOCAINE HYDROCHLORIDE 4 ML: 40 SOLUTION TOPICAL at 08:52:00

## 2018-02-01 RX ADMIN — SODIUM CHLORIDE, SODIUM LACTATE, POTASSIUM CHLORIDE, CALCIUM CHLORIDE: 600; 310; 30; 20 INJECTION, SOLUTION INTRAVENOUS at 08:42:00

## 2018-02-01 RX ADMIN — ROCURONIUM BROMIDE 20 MG: 10 INJECTION, SOLUTION INTRAVENOUS at 10:23:00

## 2018-02-01 RX ADMIN — DEXAMETHASONE SODIUM PHOSPHATE 4 MG: 4 MG/ML VIAL (ML) INJECTION at 08:54:00

## 2018-02-01 RX ADMIN — MIDAZOLAM HYDROCHLORIDE 2 MG: 1 INJECTION INTRAMUSCULAR; INTRAVENOUS at 08:42:00

## 2018-02-01 RX ADMIN — CEFAZOLIN SODIUM 3 G: 1 INJECTION, POWDER, FOR SOLUTION INTRAMUSCULAR; INTRAVENOUS at 09:10:00

## 2018-02-01 NOTE — INTERVAL H&P NOTE
Pre-op Diagnosis: Lumbar disc herniation with radiculopathy [M51.16]  HNP (herniated nucleus pulposus), lumbar [M51.26]  Degenerative scoliosis [M41.9]    The above referenced H&P was reviewed by Lyndon Mcqueen PA-C on 2/1/2018, the patient was examined an

## 2018-02-01 NOTE — ANESTHESIA POSTPROCEDURE EVALUATION
Patient: Rios Tovar    Procedure Summary     Date:  02/01/18 Room / Location:  [facility] OR [address] [place] [address] OR    Anesthesia Start:  5168 Anesthesia Stop:  1250    Procedure:  LUMBAR PERCUTANEOUS DISCECTOMY 1 LEVEL (N/A ) Diagnosis:       Lumbar disc he

## 2018-02-01 NOTE — BRIEF OP NOTE
Pre-Operative Diagnosis: Lumbar disc herniation with radiculopathy [M51.16]  HNP (herniated nucleus pulposus), lumbar [M51.26]  Degenerative scoliosis [M41.9]     Post-Operative Diagnosis: Lumbar disc herniation with radiculopathy [M51.16]HNP (herniated

## 2018-02-01 NOTE — H&P (VIEW-ONLY)
Giuliana Shaffer is a 59year old male.    Patient presents with:  Pre-Op Exam: at [facility] on 02/12/2018    Pt presents for a pre-operative physical exam.   Patient is to have Lumbar 2- Lumbar 3 Right Microdiscectomy, to be done by Dr. Jackelin Cai at [facility] Units by mouth every 14 (fourteen) days.  ) Disp: 6 capsule Rfl: 3   Respiratory Therapy Supplies (NEBULIZER/TUBING/MOUTHPIECE) Does not apply Kit As directed Disp: 3 kit Rfl: 1   Albuterol Sulfate (2.5 MG/3ML) 0.083% Inhalation Nebu Soln Take 3 mL (2.5 mg Obstructive sleep apnea    • JAGRUTI (obstructive sleep apnea) PSG 2/2/16 TX 4-916    AHI 15 Sao2 sridevi 80% CPAP 8 [employer]   • Osteoarthritis    • Postconcussion syndrome 3/25/2017   • Sleep apnea    • Unspecified essential hypertension    • Visual impairment Onset   • Hypertension Father    • Cancer Mother      colon   • Diabetes Mother    • Hypertension Mother    • Obesity Mother    • Psychiatric Mother      depression   • Other [OTHER] Brother      suicide      Social History: Occ: Currently on Disability.  Roby Simmonds 2- Lumbar 3 Right Microdiscectomy, to be done by Dr. Med Mansfield at [facility] on 02/12/2018.   Pt has the following conditions:   Preop exam for internal medicine  (primary encounter diagnosis)  Lumbar radiculitis  DDD (degenerative disc disease), l

## 2018-02-01 NOTE — OPERATIVE REPORT
[facility]   PATIENT'S NAME:  Alberto PITT HARVEY   ATTENDING PHYSICIAN:  Marycruz Jones M.D. OPERATING PHYSICIAN:  Marycruz Jones M.D.     PATIENT CSN#: [id number]  MEDICAL RECORD #:  [id number]  YOB: 1960  ADMISSION DATE: 2/1/2018  OPERATI to expose down to the fascial layer on the right aspect of the lamina. Using the Bovie, we retracted the muscular layer off the lamina using a Segura. We placed our retractor in position and confirmed level.  Used a chaz to thin the lamina and perform the hem

## 2018-02-01 NOTE — PROGRESS NOTES
S: Patient seen post operation. Reports low back pain is well controlled at this time, but notes b/l shoulder pain. No numbness/tingling of b/l upper or lower extremities. No other complaints at this time. Inspection:  Awake alert No acute distress.  No

## 2018-02-01 NOTE — ANESTHESIA PREPROCEDURE EVALUATION
Anesthesia PreOp Note    HPI:     Sandra Reynolds is a 59year old male who presents for preoperative consultation requested by: Jason Ocampo MD    Date of Surgery: 2/1/2018    Procedure(s):  LUMBAR PERCUTANEOUS DISCECTOMY 1 LEVEL  Indication: Lumbar dis rhinitis, cause unspecified    • Asthma     allergy induced   • Back problem    • BPH (benign prostatic hyperplasia)    • Cancer (HCC)     basal cell skin   • Diverticulosis of large intestine    • Elevated PSA     neg prostate biopsy 2012   • Esophageal r [address]657  1/11/2011: REPAIR NASAL STENOSIS      Comment: Performed by Ruthann Chacon at [address] [place] [address],[address]  6/23/10: REPAIR OF NASAL SEPTUM      Comment: Performed by Rajiv Harley at [place] [address] taking differently: Apply 1 Application topically daily as needed.  ) Disp: 50 g Rfl: 6 Past Week at Unknown time   PROVENTIL  (90 BASE) MCG/ACT Inhalation Aero Soln INHALE 2 PUFFS INTO THE LUNGS EVERY 4 TO 6 HOURS AS NEEDED FOR WHEEZING.  Disp: 1 In Mother      colon   • Diabetes Mother    • Hypertension Mother    • Obesity Mother    • Psychiatric Mother      depression   • Other [OTHER] Brother      suicide       Social History  Social History   Marital status:   Spouse name: N/A    Years of e Temp:  97.8 °F (36.6 °C)   TempSrc:  Oral   SpO2:  95%   Weight: 136.1 kg (300 lb) (!) 138.3 kg (305 lb)   Height: 1.778 m (5' 10\")         Anesthesia ROS/Med Hx and Physical Exam     Patient summary reviewed and Nursing notes reviewed    Airway   Thomas Slight

## 2018-02-14 PROBLEM — Z98.890 S/P LUMBAR MICRODISCECTOMY: Status: ACTIVE | Noted: 2018-02-14

## 2018-03-02 PROBLEM — M19.011 OSTEOARTHRITIS OF RIGHT GLENOHUMERAL JOINT: Status: ACTIVE | Noted: 2018-03-02

## 2018-03-15 PROBLEM — M54.50 ACUTE LEFT-SIDED LOW BACK PAIN WITHOUT SCIATICA: Status: ACTIVE | Noted: 2018-03-15

## 2018-03-18 PROCEDURE — 87086 URINE CULTURE/COLONY COUNT: CPT | Performed by: FAMILY MEDICINE

## 2018-05-31 PROCEDURE — 87081 CULTURE SCREEN ONLY: CPT | Performed by: INTERNAL MEDICINE

## 2018-06-04 RX ORDER — TIZANIDINE HYDROCHLORIDE 4 MG/1
CAPSULE, GELATIN COATED ORAL
Qty: 30 CAPSULE | Refills: 0 | Status: SHIPPED
Start: 2018-06-04 | End: 2018-06-29 | Stop reason: ALTCHOICE

## 2018-06-04 RX ORDER — TIZANIDINE 4 MG/1
4 TABLET ORAL EVERY 8 HOURS PRN
Qty: 50 TABLET | Refills: 0
Start: 2018-06-04

## 2018-06-04 NOTE — TELEPHONE ENCOUNTER
TiZANidine HCl 4 MG Oral Tab 50 tablet 0 2/1/2018    Sig :  Take 1 tablet (4 mg total) by mouth every 8 (eight) hours as needed.

## 2018-06-04 NOTE — TELEPHONE ENCOUNTER
From: Pily Causey  Sent: 6/3/2018 10:14 PM CDT  Subject: Medication Renewal Request    Randell Roche would like a refill of the following medications:     TiZANidine HCl 4 MG Oral Tab Yonny Munoz PA-C]    Preferred pharmacy: [facility]/PHARMACY #[id number]

## 2018-06-05 NOTE — TELEPHONE ENCOUNTER
Patient called to see if script is ready for . Please call him at [phone]. Patient stated that he accidentally sent the request several times in latakoohart.      Thank you, Ru Quiroz RN

## 2018-06-05 NOTE — TELEPHONE ENCOUNTER
Can you please call or escribe this medication to the pharmacy. This was authorized by your office. Patient needs this medication today. Please advise.

## 2018-07-03 PROCEDURE — 88305 TISSUE EXAM BY PATHOLOGIST: CPT | Performed by: INTERNAL MEDICINE

## 2018-07-18 PROBLEM — E88.819 INSULIN RESISTANCE: Status: ACTIVE | Noted: 2018-07-18

## 2018-07-18 PROBLEM — E88.81 INSULIN RESISTANCE: Status: ACTIVE | Noted: 2018-07-18

## 2018-08-20 PROBLEM — K59.03 DRUG-INDUCED CONSTIPATION: Status: ACTIVE | Noted: 2018-08-20

## 2018-09-13 PROCEDURE — 84153 ASSAY OF PSA TOTAL: CPT | Performed by: UROLOGY

## 2018-09-13 PROCEDURE — 84154 ASSAY OF PSA FREE: CPT | Performed by: UROLOGY

## 2018-10-18 PROBLEM — F07.81 POSTCONCUSSION SYNDROME: Status: RESOLVED | Noted: 2017-03-25 | Resolved: 2018-10-18

## 2018-10-18 PROBLEM — R00.2 PALPITATION: Status: ACTIVE | Noted: 2018-10-18

## 2018-10-18 PROBLEM — M75.121 COMPLETE TEAR OF RIGHT ROTATOR CUFF: Status: ACTIVE | Noted: 2018-10-18

## 2018-10-18 PROBLEM — K92.2 GASTROINTESTINAL HEMORRHAGE, UNSPECIFIED GASTROINTESTINAL HEMORRHAGE TYPE: Status: RESOLVED | Noted: 2017-03-24 | Resolved: 2018-10-18

## 2018-12-11 ENCOUNTER — LABORATORY ENCOUNTER (OUTPATIENT)
Dept: LAB | Facility: HOSPITAL | Age: 58
End: 2018-12-11
Payer: COMMERCIAL

## 2018-12-11 DIAGNOSIS — N40.0 BPH (BENIGN PROSTATIC HYPERPLASIA): ICD-10-CM

## 2018-12-11 DIAGNOSIS — M19.011 OSTEOARTHRITIS OF RIGHT GLENOHUMERAL JOINT: ICD-10-CM

## 2018-12-11 DIAGNOSIS — I10 ESSENTIAL HYPERTENSION, BENIGN: ICD-10-CM

## 2018-12-11 PROCEDURE — 86900 BLOOD TYPING SEROLOGIC ABO: CPT

## 2018-12-11 PROCEDURE — 86850 RBC ANTIBODY SCREEN: CPT

## 2018-12-11 PROCEDURE — 86901 BLOOD TYPING SEROLOGIC RH(D): CPT

## 2018-12-12 PROBLEM — M75.101 RIGHT ROTATOR CUFF TEAR ARTHROPATHY: Status: ACTIVE | Noted: 2018-12-12

## 2018-12-12 PROBLEM — M12.811 RIGHT ROTATOR CUFF TEAR ARTHROPATHY: Status: ACTIVE | Noted: 2018-12-12

## 2018-12-14 NOTE — H&P
[facility] [address] [place] [address]  Orthopedic Surgery  HISTORY AND PHYSICAL EXAMINATION    Patient: Arnoldo Quinonez  Medical Record Number: [id number]    CHIEF COMPLAINT: Right shoulder pain    HPI:   Shaniqua Kerry is a 59year old male followed in the office, struggle with 1 tablet daily Disp:  Rfl:    HYDROcodone-acetaminophen (NORCO)  MG Oral Tab Take 1-2 tablets every 4-6 hours as needed for pain.  Disp: 60 tablet Rfl: 0   Azelastine-Fluticasone (DYMISTA) 137-50 MCG/ACT Nasal Suspension 1 spray by Nasal route 2 (two) History:  Social History    Tobacco Use      Smoking status: Former Smoker        Packs/day: 1.50        Years: 20.00        Pack years: 27        Quit date: 1998        Years since quittin.5      Smokeless tobacco: Never Used    Alcohol use:  Yes strength 4/5. ASSESSMENT AND PLAN:   Essential hypertension, benign  (primary encounter diagnosis)  Osteoarthritis of right glenohumeral joint  BPH (benign prostatic hyperplasia)     Anette Tadeo is scheduled for elective right shoulder arthroplasty.   [place]

## 2018-12-18 ENCOUNTER — APPOINTMENT (OUTPATIENT)
Dept: GENERAL RADIOLOGY | Facility: HOSPITAL | Age: 58
DRG: 483 | End: 2018-12-18
Attending: ORTHOPAEDIC SURGERY
Payer: COMMERCIAL

## 2018-12-18 ENCOUNTER — HOSPITAL ENCOUNTER (INPATIENT)
Facility: HOSPITAL | Age: 58
LOS: 1 days | Discharge: HOME HEALTH | DRG: 483 | End: 2018-12-19
Attending: ORTHOPAEDIC SURGERY | Admitting: ORTHOPAEDIC SURGERY
Payer: COMMERCIAL

## 2018-12-18 ENCOUNTER — ANESTHESIA EVENT (OUTPATIENT)
Dept: SURGERY | Facility: HOSPITAL | Age: 58
DRG: 483 | End: 2018-12-18
Payer: COMMERCIAL

## 2018-12-18 ENCOUNTER — ANESTHESIA (OUTPATIENT)
Dept: SURGERY | Facility: HOSPITAL | Age: 58
DRG: 483 | End: 2018-12-18
Payer: COMMERCIAL

## 2018-12-18 DIAGNOSIS — N40.0 BPH (BENIGN PROSTATIC HYPERPLASIA): ICD-10-CM

## 2018-12-18 DIAGNOSIS — M19.011 OSTEOARTHRITIS OF RIGHT GLENOHUMERAL JOINT: ICD-10-CM

## 2018-12-18 DIAGNOSIS — M19.011 PRIMARY OSTEOARTHRITIS OF RIGHT SHOULDER: ICD-10-CM

## 2018-12-18 DIAGNOSIS — I10 ESSENTIAL HYPERTENSION, BENIGN: Primary | ICD-10-CM

## 2018-12-18 PROCEDURE — 3E0T3BZ INTRODUCTION OF ANESTHETIC AGENT INTO PERIPHERAL NERVES AND PLEXI, PERCUTANEOUS APPROACH: ICD-10-PCS | Performed by: STUDENT IN AN ORGANIZED HEALTH CARE EDUCATION/TRAINING PROGRAM

## 2018-12-18 PROCEDURE — 5A09357 ASSISTANCE WITH RESPIRATORY VENTILATION, LESS THAN 24 CONSECUTIVE HOURS, CONTINUOUS POSITIVE AIRWAY PRESSURE: ICD-10-PCS | Performed by: ORTHOPAEDIC SURGERY

## 2018-12-18 PROCEDURE — 88311 DECALCIFY TISSUE: CPT | Performed by: ORTHOPAEDIC SURGERY

## 2018-12-18 PROCEDURE — 73020 X-RAY EXAM OF SHOULDER: CPT | Performed by: ORTHOPAEDIC SURGERY

## 2018-12-18 PROCEDURE — 0RRJ00Z REPLACEMENT OF RIGHT SHOULDER JOINT WITH REVERSE BALL AND SOCKET SYNTHETIC SUBSTITUTE, OPEN APPROACH: ICD-10-PCS | Performed by: ORTHOPAEDIC SURGERY

## 2018-12-18 PROCEDURE — 94660 CPAP INITIATION&MGMT: CPT

## 2018-12-18 PROCEDURE — 88305 TISSUE EXAM BY PATHOLOGIST: CPT | Performed by: ORTHOPAEDIC SURGERY

## 2018-12-18 PROCEDURE — 76942 ECHO GUIDE FOR BIOPSY: CPT | Performed by: ORTHOPAEDIC SURGERY

## 2018-12-18 DEVICE — IMPLANTABLE DEVICE: Type: IMPLANTABLE DEVICE | Site: SHOULDER | Status: FUNCTIONAL

## 2018-12-18 DEVICE — SCRW EQN LCK 4.5X22: Type: IMPLANTABLE DEVICE | Site: SHOULDER | Status: FUNCTIONAL

## 2018-12-18 DEVICE — SCRW EQN LCK 4.5X26: Type: IMPLANTABLE DEVICE | Site: SHOULDER | Status: FUNCTIONAL

## 2018-12-18 DEVICE — PLATE EQUINOXE GLND STD RVRS: Type: IMPLANTABLE DEVICE | Site: SHOULDER | Status: FUNCTIONAL

## 2018-12-18 DEVICE — TRAY EQUINOXE HUM ADPR +0MM: Type: IMPLANTABLE DEVICE | Site: SHOULDER | Status: FUNCTIONAL

## 2018-12-18 DEVICE — KIT SCR SHLDR RVRS TRQ DEFINE: Type: IMPLANTABLE DEVICE | Site: SHOULDER | Status: FUNCTIONAL

## 2018-12-18 DEVICE — HUM STM EQN 13: Type: IMPLANTABLE DEVICE | Site: SHOULDER | Status: FUNCTIONAL

## 2018-12-18 RX ORDER — DIPHENHYDRAMINE HYDROCHLORIDE 50 MG/ML
25 INJECTION INTRAMUSCULAR; INTRAVENOUS ONCE AS NEEDED
Status: ACTIVE | OUTPATIENT
Start: 2018-12-18 | End: 2018-12-18

## 2018-12-18 RX ORDER — DOCUSATE SODIUM 100 MG/1
100 CAPSULE, LIQUID FILLED ORAL 2 TIMES DAILY
Status: DISCONTINUED | OUTPATIENT
Start: 2018-12-18 | End: 2018-12-19

## 2018-12-18 RX ORDER — HYDROMORPHONE HYDROCHLORIDE 1 MG/ML
0.8 INJECTION, SOLUTION INTRAMUSCULAR; INTRAVENOUS; SUBCUTANEOUS EVERY 2 HOUR PRN
Status: DISCONTINUED | OUTPATIENT
Start: 2018-12-18 | End: 2018-12-19

## 2018-12-18 RX ORDER — LOSARTAN POTASSIUM 100 MG/1
100 TABLET ORAL DAILY
Status: DISCONTINUED | OUTPATIENT
Start: 2018-12-19 | End: 2018-12-19

## 2018-12-18 RX ORDER — LOSARTAN POTASSIUM 100 MG/1
100 TABLET ORAL DAILY
COMMUNITY
End: 2019-01-14

## 2018-12-18 RX ORDER — TIZANIDINE 4 MG/1
4 TABLET ORAL 3 TIMES DAILY PRN
Status: DISCONTINUED | OUTPATIENT
Start: 2018-12-18 | End: 2018-12-19

## 2018-12-18 RX ORDER — HYDROMORPHONE HYDROCHLORIDE 1 MG/ML
0.4 INJECTION, SOLUTION INTRAMUSCULAR; INTRAVENOUS; SUBCUTANEOUS EVERY 5 MIN PRN
Status: DISCONTINUED | OUTPATIENT
Start: 2018-12-18 | End: 2018-12-18 | Stop reason: HOSPADM

## 2018-12-18 RX ORDER — ACETAMINOPHEN 325 MG/1
650 TABLET ORAL 4 TIMES DAILY
Status: DISCONTINUED | OUTPATIENT
Start: 2018-12-18 | End: 2018-12-19

## 2018-12-18 RX ORDER — SODIUM CHLORIDE 9 MG/ML
INJECTION, SOLUTION INTRAVENOUS CONTINUOUS
Status: DISCONTINUED | OUTPATIENT
Start: 2018-12-18 | End: 2018-12-19

## 2018-12-18 RX ORDER — OXYCODONE HYDROCHLORIDE 5 MG/1
5 TABLET ORAL EVERY 4 HOURS PRN
Status: DISCONTINUED | OUTPATIENT
Start: 2018-12-18 | End: 2018-12-19

## 2018-12-18 RX ORDER — KETOROLAC TROMETHAMINE 30 MG/ML
30 INJECTION, SOLUTION INTRAMUSCULAR; INTRAVENOUS EVERY 6 HOURS
Status: COMPLETED | OUTPATIENT
Start: 2018-12-18 | End: 2018-12-19

## 2018-12-18 RX ORDER — ACETAMINOPHEN 325 MG/1
TABLET ORAL
Status: COMPLETED
Start: 2018-12-18 | End: 2018-12-18

## 2018-12-18 RX ORDER — MONTELUKAST SODIUM 10 MG/1
10 TABLET ORAL DAILY
COMMUNITY
End: 2019-05-29

## 2018-12-18 RX ORDER — ALLOPURINOL 100 MG/1
200 TABLET ORAL DAILY
Status: DISCONTINUED | OUTPATIENT
Start: 2018-12-19 | End: 2018-12-19

## 2018-12-18 RX ORDER — OXYCODONE HCL 10 MG/1
10 TABLET, FILM COATED, EXTENDED RELEASE ORAL
Status: DISCONTINUED | OUTPATIENT
Start: 2018-12-18 | End: 2018-12-19

## 2018-12-18 RX ORDER — ALLOPURINOL 100 MG/1
200 TABLET ORAL DAILY
COMMUNITY
End: 2019-05-29

## 2018-12-18 RX ORDER — SODIUM PHOSPHATE, DIBASIC AND SODIUM PHOSPHATE, MONOBASIC 7; 19 G/133ML; G/133ML
1 ENEMA RECTAL ONCE AS NEEDED
Status: DISCONTINUED | OUTPATIENT
Start: 2018-12-18 | End: 2018-12-19

## 2018-12-18 RX ORDER — OXYCODONE HCL 10 MG/1
10 TABLET, FILM COATED, EXTENDED RELEASE ORAL
Status: COMPLETED | OUTPATIENT
Start: 2018-12-18 | End: 2018-12-18

## 2018-12-18 RX ORDER — CYCLOBENZAPRINE HCL 10 MG
10 TABLET ORAL 2 TIMES DAILY PRN
Status: DISCONTINUED | OUTPATIENT
Start: 2018-12-18 | End: 2018-12-18

## 2018-12-18 RX ORDER — NALOXONE HYDROCHLORIDE 0.4 MG/ML
80 INJECTION, SOLUTION INTRAMUSCULAR; INTRAVENOUS; SUBCUTANEOUS AS NEEDED
Status: DISCONTINUED | OUTPATIENT
Start: 2018-12-18 | End: 2018-12-18 | Stop reason: HOSPADM

## 2018-12-18 RX ORDER — HYDROCHLOROTHIAZIDE 25 MG/1
12.5 TABLET ORAL DAILY
Status: DISCONTINUED | OUTPATIENT
Start: 2018-12-19 | End: 2018-12-19

## 2018-12-18 RX ORDER — MEPERIDINE HYDROCHLORIDE 25 MG/ML
12.5 INJECTION INTRAMUSCULAR; INTRAVENOUS; SUBCUTANEOUS AS NEEDED
Status: DISCONTINUED | OUTPATIENT
Start: 2018-12-18 | End: 2018-12-18 | Stop reason: HOSPADM

## 2018-12-18 RX ORDER — METOCLOPRAMIDE HYDROCHLORIDE 5 MG/ML
10 INJECTION INTRAMUSCULAR; INTRAVENOUS EVERY 6 HOURS PRN
Status: DISCONTINUED | OUTPATIENT
Start: 2018-12-18 | End: 2018-12-19

## 2018-12-18 RX ORDER — MIDAZOLAM HYDROCHLORIDE 1 MG/ML
1 INJECTION INTRAMUSCULAR; INTRAVENOUS EVERY 5 MIN PRN
Status: DISCONTINUED | OUTPATIENT
Start: 2018-12-18 | End: 2018-12-18 | Stop reason: HOSPADM

## 2018-12-18 RX ORDER — METOPROLOL SUCCINATE 50 MG/1
50 TABLET, EXTENDED RELEASE ORAL
Status: DISCONTINUED | OUTPATIENT
Start: 2018-12-19 | End: 2018-12-19

## 2018-12-18 RX ORDER — SODIUM CHLORIDE, SODIUM LACTATE, POTASSIUM CHLORIDE, CALCIUM CHLORIDE 600; 310; 30; 20 MG/100ML; MG/100ML; MG/100ML; MG/100ML
INJECTION, SOLUTION INTRAVENOUS CONTINUOUS
Status: DISCONTINUED | OUTPATIENT
Start: 2018-12-18 | End: 2018-12-18 | Stop reason: HOSPADM

## 2018-12-18 RX ORDER — OXYCODONE HYDROCHLORIDE 15 MG/1
15 TABLET ORAL EVERY 4 HOURS PRN
Status: DISCONTINUED | OUTPATIENT
Start: 2018-12-18 | End: 2018-12-19

## 2018-12-18 RX ORDER — ACETAMINOPHEN 500 MG
1000 TABLET ORAL ONCE
COMMUNITY
End: 2019-06-04 | Stop reason: ALTCHOICE

## 2018-12-18 RX ORDER — ACETAMINOPHEN 325 MG/1
650 TABLET ORAL ONCE
Status: COMPLETED | OUTPATIENT
Start: 2018-12-18 | End: 2018-12-18

## 2018-12-18 RX ORDER — ONDANSETRON 2 MG/ML
4 INJECTION INTRAMUSCULAR; INTRAVENOUS EVERY 4 HOURS PRN
Status: DISCONTINUED | OUTPATIENT
Start: 2018-12-18 | End: 2018-12-19

## 2018-12-18 RX ORDER — POLYETHYLENE GLYCOL 3350 17 G/17G
17 POWDER, FOR SOLUTION ORAL DAILY PRN
Status: DISCONTINUED | OUTPATIENT
Start: 2018-12-18 | End: 2018-12-19

## 2018-12-18 RX ORDER — OXYCODONE HYDROCHLORIDE 10 MG/1
10 TABLET ORAL EVERY 4 HOURS PRN
Status: DISCONTINUED | OUTPATIENT
Start: 2018-12-18 | End: 2018-12-19

## 2018-12-18 RX ORDER — ZOLPIDEM TARTRATE 5 MG/1
5 TABLET ORAL NIGHTLY PRN
Status: DISCONTINUED | OUTPATIENT
Start: 2018-12-18 | End: 2018-12-19

## 2018-12-18 RX ORDER — ALBUTEROL SULFATE 2.5 MG/3ML
2.5 SOLUTION RESPIRATORY (INHALATION) EVERY 4 HOURS PRN
Status: DISCONTINUED | OUTPATIENT
Start: 2018-12-18 | End: 2018-12-19

## 2018-12-18 RX ORDER — ACETAMINOPHEN 500 MG
1000 TABLET ORAL ONCE
Status: DISCONTINUED | OUTPATIENT
Start: 2018-12-18 | End: 2018-12-18

## 2018-12-18 RX ORDER — HYDROMORPHONE HYDROCHLORIDE 1 MG/ML
0.4 INJECTION, SOLUTION INTRAMUSCULAR; INTRAVENOUS; SUBCUTANEOUS EVERY 2 HOUR PRN
Status: DISCONTINUED | OUTPATIENT
Start: 2018-12-18 | End: 2018-12-19

## 2018-12-18 RX ORDER — BISACODYL 10 MG
10 SUPPOSITORY, RECTAL RECTAL
Status: DISCONTINUED | OUTPATIENT
Start: 2018-12-18 | End: 2018-12-19

## 2018-12-18 RX ORDER — HYDROMORPHONE HYDROCHLORIDE 1 MG/ML
INJECTION, SOLUTION INTRAMUSCULAR; INTRAVENOUS; SUBCUTANEOUS
Status: COMPLETED
Start: 2018-12-18 | End: 2018-12-18

## 2018-12-18 RX ORDER — ONDANSETRON 2 MG/ML
4 INJECTION INTRAMUSCULAR; INTRAVENOUS AS NEEDED
Status: DISCONTINUED | OUTPATIENT
Start: 2018-12-18 | End: 2018-12-18 | Stop reason: HOSPADM

## 2018-12-18 RX ORDER — HYDROMORPHONE HYDROCHLORIDE 1 MG/ML
0.2 INJECTION, SOLUTION INTRAMUSCULAR; INTRAVENOUS; SUBCUTANEOUS EVERY 2 HOUR PRN
Status: DISCONTINUED | OUTPATIENT
Start: 2018-12-18 | End: 2018-12-19

## 2018-12-18 RX ORDER — MONTELUKAST SODIUM 10 MG/1
10 TABLET ORAL DAILY
Status: DISCONTINUED | OUTPATIENT
Start: 2018-12-19 | End: 2018-12-19

## 2018-12-18 RX ORDER — DIPHENHYDRAMINE HYDROCHLORIDE 50 MG/ML
12.5 INJECTION INTRAMUSCULAR; INTRAVENOUS AS NEEDED
Status: DISCONTINUED | OUTPATIENT
Start: 2018-12-18 | End: 2018-12-18 | Stop reason: HOSPADM

## 2018-12-18 RX ORDER — HEPARIN SODIUM 5000 [USP'U]/ML
5000 INJECTION, SOLUTION INTRAVENOUS; SUBCUTANEOUS EVERY 8 HOURS SCHEDULED
Status: DISCONTINUED | OUTPATIENT
Start: 2018-12-18 | End: 2018-12-18

## 2018-12-18 NOTE — ANESTHESIA PREPROCEDURE EVALUATION
PRE-OP EVALUATION    Patient Name: Elvis Younger    Pre-op Diagnosis: Primary osteoarthritis of right shoulder [M19.011]    Procedure(s):  RIGHT REVERSE  SHOULDER ARTHROPLASTY    Surgeon(s) and Role:     * Fatou Kong MD - Primary    Pre-op vitals summary reviewed. Anesthetic Complications  (-) history of anesthetic complications         GI/Hepatic/Renal      (+) GERD                           Cardiovascular      ECG reviewed.   Exercise tolerance: good     MET: >4    (+) obesity  (+) hypertension SEPTOPLASTY WITH SUBMUCOUS RESECTION INFERIOR Bilateral 6/23/2010    Performed by Adamaris Angulo MD at [facility]   • OTHER SURGICAL HISTORY  10/12/2012    Prostate Biopsy - Dr. Les Mccormack    • SEPTOPLASTY NASAL N/A 1/11/2011    Performed by Shira Sanchez The consent was signed without further questions.

## 2018-12-18 NOTE — ANESTHESIA POSTPROCEDURE EVALUATION
[facility] Patient Status:  Surgery Admit   Age/Gender 59year old male MRN [id number]   [facility] SURGERY Attending Kacy Alaniz MD   Hosp Day # 0 PCP Dayanara Rodriguez MD       Anesthesia Post-op Note    Proce

## 2018-12-18 NOTE — INTERVAL H&P NOTE
Pre-op Diagnosis: Primary osteoarthritis of right shoulder [M19.011]    The above referenced H&P was reviewed by Guillermo Meza MD on 12/18/2018, the patient was examined and no significant changes have occurred in the patient's condition since the H&P w

## 2018-12-18 NOTE — CONSULTS
General Medicine Consult      Reason for consult: post-op medical management     Consulted by: Dr. Bryant Das    PCP: Burke Merrill MD      History of Present Illness: Patient is a 59year old male with PMH sig for HTN, HLD, asthma, BPH, obesity, [employer]   • DEBRIDE MASTOID CAVITY,SIMPLE     • EAR CARTILAGE HARVEST GRAFT N/A 1/11/2011    Performed by Dangelo Villafuerte at [facility]   • ESOPHAGOGASTRODUODENOSCOPY (EGD) N/A 3/24/2017    Performed by Bettina Rivera MD at [facility] ENDOSCOPY   • [place] [address] Take 1 tablet (50 mg total) by mouth daily. Disp: 90 tablet Rfl: 3   TraMADol HCl 50 MG Oral Tab Take 1 tablet (50 mg total) by mouth every 12 (twelve) hours as needed for Pain.  Disp: 60 tablet Rfl: 1   Cyclobenzaprine HCl 10 MG Oral Tab 1 tablet by mouth Packs/day: 1.50        Years: 20.00        Pack years: 27        Quit date: 1998        Years since quittin.5      Smokeless tobacco: Never Used    Alcohol use:  Yes      Alcohol/week: 4.2 - 8.4 oz      Types: 7 - 14 Standard drinks or equivalent p 12/18/2018  PROCEDURE:  XR SHOULDER, (1 VIEW), RIGHT (CPT=73020)  TECHNIQUE:  AP view of the shoulder was obtained. COMPARISON:  None.   INDICATIONS:  Right shoulder arthroplasty  PATIENT STATED HISTORY: (As transcribed by Technologist)  Patient offered no

## 2018-12-19 VITALS
HEIGHT: 70.5 IN | BODY MASS INDEX: 42.32 KG/M2 | TEMPERATURE: 98 F | HEART RATE: 64 BPM | DIASTOLIC BLOOD PRESSURE: 80 MMHG | WEIGHT: 298.94 LBS | RESPIRATION RATE: 18 BRPM | OXYGEN SATURATION: 95 % | SYSTOLIC BLOOD PRESSURE: 143 MMHG

## 2018-12-19 PROBLEM — Z47.89 ORTHOPEDIC AFTERCARE: Status: ACTIVE | Noted: 2018-12-19

## 2018-12-19 PROCEDURE — 85027 COMPLETE CBC AUTOMATED: CPT | Performed by: ORTHOPAEDIC SURGERY

## 2018-12-19 PROCEDURE — 97110 THERAPEUTIC EXERCISES: CPT

## 2018-12-19 PROCEDURE — 97530 THERAPEUTIC ACTIVITIES: CPT

## 2018-12-19 PROCEDURE — 97162 PT EVAL MOD COMPLEX 30 MIN: CPT

## 2018-12-19 PROCEDURE — 97165 OT EVAL LOW COMPLEX 30 MIN: CPT

## 2018-12-19 PROCEDURE — 80048 BASIC METABOLIC PNL TOTAL CA: CPT | Performed by: INTERNAL MEDICINE

## 2018-12-19 PROCEDURE — 97535 SELF CARE MNGMENT TRAINING: CPT

## 2018-12-19 RX ORDER — PSEUDOEPHEDRINE HCL 30 MG
100 TABLET ORAL 2 TIMES DAILY
Qty: 28 CAPSULE | Refills: 0 | Status: ON HOLD | OUTPATIENT
Start: 2018-12-19 | End: 2020-01-07

## 2018-12-19 RX ORDER — HYDRALAZINE HYDROCHLORIDE 20 MG/ML
10 INJECTION INTRAMUSCULAR; INTRAVENOUS EVERY 4 HOURS PRN
Status: DISCONTINUED | OUTPATIENT
Start: 2018-12-19 | End: 2018-12-19

## 2018-12-19 RX ORDER — SIMETHICONE 80 MG
80 TABLET,CHEWABLE ORAL 4 TIMES DAILY PRN
Status: DISCONTINUED | OUTPATIENT
Start: 2018-12-19 | End: 2018-12-19

## 2018-12-19 RX ORDER — TIZANIDINE 2 MG/1
2 TABLET ORAL 3 TIMES DAILY PRN
Status: DISCONTINUED | OUTPATIENT
Start: 2018-12-19 | End: 2018-12-19

## 2018-12-19 RX ORDER — HYDROCODONE BITARTRATE AND ACETAMINOPHEN 10; 325 MG/1; MG/1
1 TABLET ORAL EVERY 4 HOURS PRN
Status: DISCONTINUED | OUTPATIENT
Start: 2018-12-19 | End: 2018-12-19 | Stop reason: DRUGHIGH

## 2018-12-19 RX ORDER — POLYETHYLENE GLYCOL 3350 17 G/17G
17 POWDER, FOR SOLUTION ORAL DAILY PRN
Qty: 24 EACH | Refills: 0 | Status: SHIPPED | OUTPATIENT
Start: 2018-12-19 | End: 2019-05-29 | Stop reason: ALTCHOICE

## 2018-12-19 RX ORDER — HYDROCODONE BITARTRATE AND ACETAMINOPHEN 10; 325 MG/1; MG/1
2 TABLET ORAL EVERY 4 HOURS PRN
Status: DISCONTINUED | OUTPATIENT
Start: 2018-12-19 | End: 2018-12-19 | Stop reason: DRUGHIGH

## 2018-12-19 RX ORDER — HYDROCODONE BITARTRATE AND ACETAMINOPHEN 5; 325 MG/1; MG/1
1 TABLET ORAL EVERY 4 HOURS PRN
Status: DISCONTINUED | OUTPATIENT
Start: 2018-12-19 | End: 2018-12-19

## 2018-12-19 RX ORDER — HYDROCODONE BITARTRATE AND ACETAMINOPHEN 5; 325 MG/1; MG/1
2 TABLET ORAL EVERY 4 HOURS PRN
Status: DISCONTINUED | OUTPATIENT
Start: 2018-12-19 | End: 2018-12-19

## 2018-12-19 RX ORDER — SIMETHICONE 80 MG
160 TABLET,CHEWABLE ORAL 4 TIMES DAILY PRN
Status: DISCONTINUED | OUTPATIENT
Start: 2018-12-19 | End: 2018-12-19

## 2018-12-19 NOTE — PROGRESS NOTES
Post Op Day 1 Ortho Note    Status Post Nerve Block:  Type of Nerve Block: Right Interscalene  Single Injection Nerve Block    Post op review: No evidence of immediate block related complications and No paresthesia noted    Patient rates pain 2-3/10 at res

## 2018-12-19 NOTE — PAYOR COMM NOTE
--------------  ADMISSION REVIEW         DIRECT FR OR      OPERATIVE REPORT        PREOPERATIVE DIAGNOSIS:  Cuff tear arthropathy, right shoulder. POSTOPERATIVE DIAGNOSIS:  Cuff tear arthropathy, right shoulder.   PROCEDURE:  Right reverse shoulder arthrop

## 2018-12-19 NOTE — CM/SW NOTE
12/19/18 1200   CM/SW Referral Data   Referral Source Physician   Reason for Referral Discharge planning   Informant Patient;Spouse;Edward Staff   Pertinent Medical Hx   Primary Care Physician Name EMMANUELLE Sheehan MD   Patient Info   Patient's Mental Status

## 2018-12-19 NOTE — PROGRESS NOTES
[facility] Hospitalist Progress Note     Paolo Keller Patient Status:  Inpatient    1960 MRN [id number]   [facility] 3SW-A Attending Alcon Molina MD   Hosp Day # 1 PCP José Luis Okeefe MD     CC: follow up    SUBJECTIVE:  Stable o OxyCODONE HCl ER  10 mg Oral [email or web address]   • docusate sodium  100 mg Oral BID   • allopurinol  200 mg Oral Daily   • hydrochlorothiazide  12.5 mg Oral Daily   • losartan  100 mg Oral Daily   • Metoprolol Succinate ER  50 mg Oral Daily Beta Blocker   • Mo

## 2018-12-19 NOTE — CM/SW NOTE
12/19/18 1200   Discharge disposition   Expected discharge disposition Home-Health   Name of Facillity/Home Care/Hospice [facility]   Discharge transportation Private car

## 2018-12-19 NOTE — PHYSICAL THERAPY NOTE
PHYSICAL THERAPY QUICK EVALUATION - INPATIENT    Room Number: 360/360-A  Evaluation Date: 12/19/2018  Presenting Problem: R reverse shld arthroplasty on 12/18/18  Physician Order: PT Eval and Treat      PMH includes: HTN, HLD, asthma, BPH, obesity, gout, Darin Perdue MD at [address] [place] Right 7/2/2014    Performed by Cherlynn Cheadle, MD at [address] N/A 1/11/2011    Performed by Gwen Eisenberg at [facility] 3  Location: right shld  Management Techniques: Repositioning   RANGE OF MOTION AND STRENGTH ASSESSMENT  Upper extremity ROM and strength are WFL with the exception of R shld due to recent surgery    Lower extremity ROM is within functional limits     Brent Arshad met;Call light within reach;RN aware of session/findings; Ice applied; Family present    ASSESSMENT   Patient is a 59year old male admitted on 12/18/2018 for R reverse TSA.   Pertinent comorbidities and personal factors impacting therapy include obesity/philippe

## 2018-12-19 NOTE — PROGRESS NOTES
[facility] [address] [place] [address]  Orthopedic Surgery  Progress Note    Trenton Favorite Patient Status:  Inpatient    1960 MRN [id number]   [facility] 3SW-A Attending Aleshia Carrera MD   Hosp Day # 1 PCP Yeni Patrick MD     SUBJECTIVE:  IN

## 2018-12-19 NOTE — HOME CARE LIAISON
[facility] RE REFERRED  [facility] IS NOT CONTRACTED WITH [facility] INSURANCE    THANKS  Saravanan Ramirez

## 2018-12-19 NOTE — RESPIRATORY THERAPY NOTE
JAGRUTI - Equipment Use Daily Summary:                  . Set Mode: AUTO CPAP WITH C-FLEX                . Usage in hours: 8:50                . 90% Pressure (EPAP) level: 11.7                . 90% Insp. Pressure (IPAP): Marc Barber  AHI: 2.3

## 2018-12-19 NOTE — PLAN OF CARE
NURSING DISCHARGE NOTE    Discharged Home via Wheelchair. Accompanied by Support staff  Belongings Taken by patient/family. Patient and spouse watched discharge video and verbalized understanding. This RN also reviewed discharge paperwork.  States h

## 2018-12-19 NOTE — OCCUPATIONAL THERAPY NOTE
OCCUPATIONAL THERAPY QUICK EVALUATION - INPATIENT    Room Number: 360/360-A  Evaluation Date: 12/19/2018     Type of Evaluation: Initial  Presenting Problem: right total shoulder    Physician Order: IP Consult to Occupational Therapy  Reason for Therapy: Jaja Ingrma MD at [address] [place] Right 7/2/2014    Performed by Lio Nj MD at [address] N/A 1/11/2011    Performed by Elizabeth Ohara at [address] [facility] Restriction: R upper extremity  R Upper Extremity: Non-Weight Bearing             PAIN ASSESSMENT  Rating: 3  Location: shoulder  Management Techniques: Activity promotion; Body mechanics;Breathing techniques;Repositioning;Relaxation    COGNITION  PEDRO LUIS ODOM history and occupational profile noted above. Functional outcome measures completed include AMPAC, MMT, ROM.  In this OT eval patient performed all ADL tasks, functional transfers, dynamic reaching and functional mobility safely, without loss of balance, an

## 2019-01-09 PROCEDURE — 81015 MICROSCOPIC EXAM OF URINE: CPT | Performed by: UROLOGY

## 2019-01-09 PROCEDURE — 87086 URINE CULTURE/COLONY COUNT: CPT | Performed by: UROLOGY

## 2019-01-23 PROCEDURE — 87086 URINE CULTURE/COLONY COUNT: CPT | Performed by: UROLOGY

## 2019-04-10 ENCOUNTER — WALK IN (OUTPATIENT)
Dept: URGENT CARE | Age: 59
End: 2019-04-10

## 2019-04-10 DIAGNOSIS — L03.012 PARONYCHIA OF FINGER OF LEFT HAND: Primary | ICD-10-CM

## 2019-04-10 PROCEDURE — 99203 OFFICE O/P NEW LOW 30 MIN: CPT | Performed by: NURSE PRACTITIONER

## 2019-04-10 RX ORDER — LOSARTAN POTASSIUM 100 MG/1
100 TABLET ORAL DAILY
COMMUNITY

## 2019-04-10 RX ORDER — METOPROLOL SUCCINATE 100 MG/1
100 TABLET, EXTENDED RELEASE ORAL DAILY
COMMUNITY

## 2019-04-10 RX ORDER — MONTELUKAST SODIUM 10 MG/1
10 TABLET ORAL NIGHTLY
COMMUNITY

## 2019-04-10 RX ORDER — PNV NO.95/FERROUS FUM/FOLIC AC 28MG-0.8MG
TABLET ORAL DAILY
COMMUNITY

## 2019-04-10 RX ORDER — ALLOPURINOL 100 MG/1
100 TABLET ORAL 2 TIMES DAILY
COMMUNITY

## 2019-04-10 RX ORDER — HYDROCHLOROTHIAZIDE 12.5 MG/1
12.5 TABLET ORAL DAILY
COMMUNITY

## 2019-04-10 ASSESSMENT — ENCOUNTER SYMPTOMS
RESPIRATORY NEGATIVE: 1
CHILLS: 0
DIAPHORESIS: 0
PSYCHIATRIC NEGATIVE: 1
FEVER: 0
HEMATOLOGIC/LYMPHATIC NEGATIVE: 1

## 2019-04-10 ASSESSMENT — PAIN SCALES - GENERAL: PAINLEVEL: 5-6

## 2019-05-08 PROBLEM — M75.102 LEFT ROTATOR CUFF TEAR ARTHROPATHY: Status: ACTIVE | Noted: 2019-05-08

## 2019-05-08 PROBLEM — M12.812 LEFT ROTATOR CUFF TEAR ARTHROPATHY: Status: ACTIVE | Noted: 2019-05-08

## 2019-05-29 PROCEDURE — 87081 CULTURE SCREEN ONLY: CPT | Performed by: INTERNAL MEDICINE

## 2019-06-12 PROBLEM — Z96.611 STATUS POST REVERSE TOTAL REPLACEMENT OF RIGHT SHOULDER: Status: ACTIVE | Noted: 2019-06-12

## 2019-06-14 NOTE — H&P
[facility]  Orthopedic Surgery  HISTORY AND PHYSICAL EXAMINATION    Patient: Salvatore Scott  Medical Record Number: [id number]    CHIEF COMPLAINT: Left shoulder pain and weakness in the office, struggle with disability in his shoulders, symptoms MG Oral Tab 1 tablet by mouth twice a day as needed for muscle spasms. Disp: 40 tablet Rfl: 0   Albuterol Sulfate HFA (PROAIR HFA) 108 (90 Base) MCG/ACT Inhalation Aero Soln INHALE 2 PUFFS INTO THE LUNGS EVERY 4 TO 6 HOURS AS NEEDED FOR WHEEZING.  Disp: 1 I Diverticulosis of large intestine    • Elevated PSA     neg prostate biopsy 2012   • Esophageal reflux    • Extrinsic asthma, unspecified     ALLERGY INDUCED  ASTHMA   • Gout    • Gout    • High blood pressure    • Insulin resistance 7/18/2018   • OBESITY gallops  LUNGS: CTA bilaterally, no rales or wheezes  ABDOMINAL: Soft, no palpable masses. NEURO:  Alert and orientated X3, cranial nerves II-XII intact. EXTREMITIES: Well-developed well-nourished 78-year-old male. Normal gait station.   Ambulates withou

## 2019-06-18 ENCOUNTER — APPOINTMENT (OUTPATIENT)
Dept: GENERAL RADIOLOGY | Facility: HOSPITAL | Age: 59
DRG: 483 | End: 2019-06-18
Attending: ORTHOPAEDIC SURGERY
Payer: COMMERCIAL

## 2019-06-18 ENCOUNTER — ANESTHESIA EVENT (OUTPATIENT)
Dept: SURGERY | Facility: HOSPITAL | Age: 59
DRG: 483 | End: 2019-06-18
Payer: COMMERCIAL

## 2019-06-18 ENCOUNTER — ANESTHESIA (OUTPATIENT)
Dept: SURGERY | Facility: HOSPITAL | Age: 59
DRG: 483 | End: 2019-06-18
Payer: COMMERCIAL

## 2019-06-18 ENCOUNTER — HOSPITAL ENCOUNTER (INPATIENT)
Facility: HOSPITAL | Age: 59
LOS: 1 days | Discharge: HOME HEALTH | DRG: 483 | End: 2019-06-19
Attending: ORTHOPAEDIC SURGERY | Admitting: ORTHOPAEDIC SURGERY
Payer: COMMERCIAL

## 2019-06-18 DIAGNOSIS — M19.011 PRIMARY OSTEOARTHRITIS OF RIGHT SHOULDER: ICD-10-CM

## 2019-06-18 PROCEDURE — 86850 RBC ANTIBODY SCREEN: CPT

## 2019-06-18 PROCEDURE — 73020 X-RAY EXAM OF SHOULDER: CPT | Performed by: ORTHOPAEDIC SURGERY

## 2019-06-18 PROCEDURE — 0RRK00Z REPLACEMENT OF LEFT SHOULDER JOINT WITH REVERSE BALL AND SOCKET SYNTHETIC SUBSTITUTE, OPEN APPROACH: ICD-10-PCS | Performed by: ORTHOPAEDIC SURGERY

## 2019-06-18 PROCEDURE — 76942 ECHO GUIDE FOR BIOPSY: CPT | Performed by: ORTHOPAEDIC SURGERY

## 2019-06-18 PROCEDURE — 86901 BLOOD TYPING SEROLOGIC RH(D): CPT

## 2019-06-18 PROCEDURE — 86900 BLOOD TYPING SEROLOGIC ABO: CPT

## 2019-06-18 PROCEDURE — 88305 TISSUE EXAM BY PATHOLOGIST: CPT | Performed by: ORTHOPAEDIC SURGERY

## 2019-06-18 PROCEDURE — 3E0T3BZ INTRODUCTION OF ANESTHETIC AGENT INTO PERIPHERAL NERVES AND PLEXI, PERCUTANEOUS APPROACH: ICD-10-PCS | Performed by: STUDENT IN AN ORGANIZED HEALTH CARE EDUCATION/TRAINING PROGRAM

## 2019-06-18 PROCEDURE — 88311 DECALCIFY TISSUE: CPT | Performed by: ORTHOPAEDIC SURGERY

## 2019-06-18 RX ORDER — SODIUM CHLORIDE, SODIUM LACTATE, POTASSIUM CHLORIDE, CALCIUM CHLORIDE 600; 310; 30; 20 MG/100ML; MG/100ML; MG/100ML; MG/100ML
INJECTION, SOLUTION INTRAVENOUS CONTINUOUS
Status: DISCONTINUED | OUTPATIENT
Start: 2019-06-18 | End: 2019-06-18 | Stop reason: HOSPADM

## 2019-06-18 RX ORDER — HYDROMORPHONE HYDROCHLORIDE 1 MG/ML
0.8 INJECTION, SOLUTION INTRAMUSCULAR; INTRAVENOUS; SUBCUTANEOUS EVERY 2 HOUR PRN
Status: DISCONTINUED | OUTPATIENT
Start: 2019-06-18 | End: 2019-06-19

## 2019-06-18 RX ORDER — SCOLOPAMINE TRANSDERMAL SYSTEM 1 MG/1
1 PATCH, EXTENDED RELEASE TRANSDERMAL ONCE
Status: DISCONTINUED | OUTPATIENT
Start: 2019-06-18 | End: 2019-06-18 | Stop reason: HOSPADM

## 2019-06-18 RX ORDER — HYDROMORPHONE HYDROCHLORIDE 1 MG/ML
0.2 INJECTION, SOLUTION INTRAMUSCULAR; INTRAVENOUS; SUBCUTANEOUS EVERY 2 HOUR PRN
Status: DISCONTINUED | OUTPATIENT
Start: 2019-06-18 | End: 2019-06-19

## 2019-06-18 RX ORDER — DIPHENHYDRAMINE HYDROCHLORIDE 50 MG/ML
25 INJECTION INTRAMUSCULAR; INTRAVENOUS ONCE AS NEEDED
Status: ACTIVE | OUTPATIENT
Start: 2019-06-18 | End: 2019-06-18

## 2019-06-18 RX ORDER — OXYCODONE HCL 10 MG/1
10 TABLET, FILM COATED, EXTENDED RELEASE ORAL
Status: COMPLETED | OUTPATIENT
Start: 2019-06-18 | End: 2019-06-18

## 2019-06-18 RX ORDER — SODIUM CHLORIDE, SODIUM LACTATE, POTASSIUM CHLORIDE, CALCIUM CHLORIDE 600; 310; 30; 20 MG/100ML; MG/100ML; MG/100ML; MG/100ML
INJECTION, SOLUTION INTRAVENOUS CONTINUOUS
Status: DISCONTINUED | OUTPATIENT
Start: 2019-06-18 | End: 2019-06-18

## 2019-06-18 RX ORDER — ALLOPURINOL 100 MG/1
200 TABLET ORAL DAILY
Status: DISCONTINUED | OUTPATIENT
Start: 2019-06-18 | End: 2019-06-19

## 2019-06-18 RX ORDER — DOCUSATE SODIUM 100 MG/1
100 CAPSULE, LIQUID FILLED ORAL 2 TIMES DAILY
Status: DISCONTINUED | OUTPATIENT
Start: 2019-06-18 | End: 2019-06-19

## 2019-06-18 RX ORDER — DIPHENHYDRAMINE HYDROCHLORIDE 50 MG/ML
12.5 INJECTION INTRAMUSCULAR; INTRAVENOUS AS NEEDED
Status: DISCONTINUED | OUTPATIENT
Start: 2019-06-18 | End: 2019-06-18 | Stop reason: HOSPADM

## 2019-06-18 RX ORDER — ALLOPURINOL 100 MG/1
200 TABLET ORAL DAILY
COMMUNITY
End: 2019-09-25

## 2019-06-18 RX ORDER — MEPERIDINE HYDROCHLORIDE 25 MG/ML
12.5 INJECTION INTRAMUSCULAR; INTRAVENOUS; SUBCUTANEOUS AS NEEDED
Status: DISCONTINUED | OUTPATIENT
Start: 2019-06-18 | End: 2019-06-18 | Stop reason: HOSPADM

## 2019-06-18 RX ORDER — ALBUTEROL SULFATE 2.5 MG/3ML
2.5 SOLUTION RESPIRATORY (INHALATION) EVERY 4 HOURS PRN
Status: DISCONTINUED | OUTPATIENT
Start: 2019-06-18 | End: 2019-06-19

## 2019-06-18 RX ORDER — HYDROMORPHONE HYDROCHLORIDE 1 MG/ML
0.4 INJECTION, SOLUTION INTRAMUSCULAR; INTRAVENOUS; SUBCUTANEOUS EVERY 2 HOUR PRN
Status: DISCONTINUED | OUTPATIENT
Start: 2019-06-18 | End: 2019-06-19

## 2019-06-18 RX ORDER — MONTELUKAST SODIUM 10 MG/1
10 TABLET ORAL NIGHTLY
Status: DISCONTINUED | OUTPATIENT
Start: 2019-06-18 | End: 2019-06-19

## 2019-06-18 RX ORDER — OXYCODONE HYDROCHLORIDE 15 MG/1
15 TABLET ORAL EVERY 4 HOURS PRN
Status: DISCONTINUED | OUTPATIENT
Start: 2019-06-18 | End: 2019-06-19

## 2019-06-18 RX ORDER — OXYCODONE HCL 10 MG/1
10 TABLET, FILM COATED, EXTENDED RELEASE ORAL
Status: DISCONTINUED | OUTPATIENT
Start: 2019-06-18 | End: 2019-06-19

## 2019-06-18 RX ORDER — LOSARTAN POTASSIUM 100 MG/1
100 TABLET ORAL DAILY
Status: DISCONTINUED | OUTPATIENT
Start: 2019-06-18 | End: 2019-06-19

## 2019-06-18 RX ORDER — BISACODYL 10 MG
10 SUPPOSITORY, RECTAL RECTAL
Status: DISCONTINUED | OUTPATIENT
Start: 2019-06-18 | End: 2019-06-19

## 2019-06-18 RX ORDER — SCOLOPAMINE TRANSDERMAL SYSTEM 1 MG/1
PATCH, EXTENDED RELEASE TRANSDERMAL
Status: DISPENSED
Start: 2019-06-18 | End: 2019-06-18

## 2019-06-18 RX ORDER — POLYETHYLENE GLYCOL 3350 17 G/17G
17 POWDER, FOR SOLUTION ORAL DAILY PRN
Status: DISCONTINUED | OUTPATIENT
Start: 2019-06-18 | End: 2019-06-19

## 2019-06-18 RX ORDER — ZOLPIDEM TARTRATE 5 MG/1
5 TABLET ORAL NIGHTLY PRN
Status: DISCONTINUED | OUTPATIENT
Start: 2019-06-18 | End: 2019-06-19

## 2019-06-18 RX ORDER — ONDANSETRON 2 MG/ML
4 INJECTION INTRAMUSCULAR; INTRAVENOUS AS NEEDED
Status: DISCONTINUED | OUTPATIENT
Start: 2019-06-18 | End: 2019-06-18 | Stop reason: HOSPADM

## 2019-06-18 RX ORDER — HYDROMORPHONE HYDROCHLORIDE 1 MG/ML
0.4 INJECTION, SOLUTION INTRAMUSCULAR; INTRAVENOUS; SUBCUTANEOUS EVERY 5 MIN PRN
Status: DISCONTINUED | OUTPATIENT
Start: 2019-06-18 | End: 2019-06-18 | Stop reason: HOSPADM

## 2019-06-18 RX ORDER — SODIUM CHLORIDE 9 MG/ML
INJECTION, SOLUTION INTRAVENOUS CONTINUOUS
Status: DISCONTINUED | OUTPATIENT
Start: 2019-06-18 | End: 2019-06-19

## 2019-06-18 RX ORDER — METOCLOPRAMIDE HYDROCHLORIDE 5 MG/ML
10 INJECTION INTRAMUSCULAR; INTRAVENOUS EVERY 6 HOURS PRN
Status: DISCONTINUED | OUTPATIENT
Start: 2019-06-18 | End: 2019-06-19

## 2019-06-18 RX ORDER — MIDAZOLAM HYDROCHLORIDE 1 MG/ML
1 INJECTION INTRAMUSCULAR; INTRAVENOUS EVERY 5 MIN PRN
Status: DISCONTINUED | OUTPATIENT
Start: 2019-06-18 | End: 2019-06-18 | Stop reason: HOSPADM

## 2019-06-18 RX ORDER — ONDANSETRON 2 MG/ML
4 INJECTION INTRAMUSCULAR; INTRAVENOUS EVERY 4 HOURS PRN
Status: DISCONTINUED | OUTPATIENT
Start: 2019-06-18 | End: 2019-06-19

## 2019-06-18 RX ORDER — SODIUM PHOSPHATE, DIBASIC AND SODIUM PHOSPHATE, MONOBASIC 7; 19 G/133ML; G/133ML
1 ENEMA RECTAL ONCE AS NEEDED
Status: DISCONTINUED | OUTPATIENT
Start: 2019-06-18 | End: 2019-06-19

## 2019-06-18 RX ORDER — PROCHLORPERAZINE EDISYLATE 5 MG/ML
10 INJECTION INTRAMUSCULAR; INTRAVENOUS EVERY 6 HOURS PRN
Status: DISCONTINUED | OUTPATIENT
Start: 2019-06-18 | End: 2019-06-19

## 2019-06-18 RX ORDER — KETOROLAC TROMETHAMINE 30 MG/ML
30 INJECTION, SOLUTION INTRAMUSCULAR; INTRAVENOUS EVERY 6 HOURS
Status: COMPLETED | OUTPATIENT
Start: 2019-06-18 | End: 2019-06-19

## 2019-06-18 RX ORDER — OXYCODONE HYDROCHLORIDE 10 MG/1
10 TABLET ORAL EVERY 4 HOURS PRN
Status: DISCONTINUED | OUTPATIENT
Start: 2019-06-18 | End: 2019-06-19

## 2019-06-18 RX ORDER — ACETAMINOPHEN 500 MG
1000 TABLET ORAL ONCE
Status: DISCONTINUED | OUTPATIENT
Start: 2019-06-18 | End: 2019-06-18 | Stop reason: HOSPADM

## 2019-06-18 RX ORDER — ACETAMINOPHEN 325 MG/1
650 TABLET ORAL 4 TIMES DAILY
Status: DISCONTINUED | OUTPATIENT
Start: 2019-06-18 | End: 2019-06-19

## 2019-06-18 RX ORDER — ACETAMINOPHEN 325 MG/1
650 TABLET ORAL ONCE
Status: COMPLETED | OUTPATIENT
Start: 2019-06-18 | End: 2019-06-18

## 2019-06-18 RX ORDER — METOPROLOL SUCCINATE 100 MG/1
100 TABLET, EXTENDED RELEASE ORAL DAILY
Status: DISCONTINUED | OUTPATIENT
Start: 2019-06-18 | End: 2019-06-19

## 2019-06-18 RX ORDER — NALOXONE HYDROCHLORIDE 0.4 MG/ML
80 INJECTION, SOLUTION INTRAMUSCULAR; INTRAVENOUS; SUBCUTANEOUS AS NEEDED
Status: DISCONTINUED | OUTPATIENT
Start: 2019-06-18 | End: 2019-06-18 | Stop reason: HOSPADM

## 2019-06-18 RX ORDER — ACETAMINOPHEN 325 MG/1
TABLET ORAL
Status: COMPLETED
Start: 2019-06-18 | End: 2019-06-18

## 2019-06-18 RX ORDER — MONTELUKAST SODIUM 10 MG/1
10 TABLET ORAL NIGHTLY
COMMUNITY
End: 2019-07-23

## 2019-06-18 RX ORDER — OXYCODONE HYDROCHLORIDE 5 MG/1
5 TABLET ORAL EVERY 4 HOURS PRN
Status: DISCONTINUED | OUTPATIENT
Start: 2019-06-18 | End: 2019-06-19

## 2019-06-18 RX ORDER — TIZANIDINE 4 MG/1
4 TABLET ORAL 3 TIMES DAILY PRN
Status: DISCONTINUED | OUTPATIENT
Start: 2019-06-18 | End: 2019-06-19

## 2019-06-18 RX ORDER — OXYCODONE HCL 10 MG/1
TABLET, FILM COATED, EXTENDED RELEASE ORAL
Status: COMPLETED
Start: 2019-06-18 | End: 2019-06-18

## 2019-06-18 NOTE — CONSULTS
[facility] Hospitalist Initial Consult       Reason for consult: Medical Management sp L TSA      History of Present Illness: Patient is a 62year old male with PMH sig for htn, gout, asthma who presents sp TSA.    They tolerated the procedure well without any imm Performed by Fadia Brown MD at [address] [place] [address]   • DEBRIDE MASTOID CAVITY,SIMPLE     • EAR CARTILAGE HARVEST GRAFT N/A 1/11/2011    Performed by Adelaida Lang at [address] [place] [address]   • ESOPHAGOGASTRODUODENOSCOPY (EGD) N/A 3/24/2017    Per drinks or equivalent per week       Fam Hx  Family History   Problem Relation Age of Onset   • Hypertension Father    • Cancer Mother         colon   • Diabetes Mother    • Hypertension Mother    • Obesity Mother    • Psychiatric Mother         depression inhalers    Prevention: , SCDs, bowel regimen      Outpatient records and previous hospital records reviewed. Thank you for allowing me to participate in the care of this patient.      Sammy Mayorga MD  [facility] Hospitalist  Pager [phone]

## 2019-06-18 NOTE — ANESTHESIA PREPROCEDURE EVALUATION
PRE-OP EVALUATION    Patient Name: Alma Colindres    Pre-op Diagnosis: Primary osteoarthritis of right shoulder [M19.011]    Procedure(s):  LEFT REVERSE SHOULDER ARTHROPLASTY    Surgeon(s) and Role:     * Jeanie Canseco MD - Primary    Pre-op vitals r Take 100 mg by mouth 2 (two) times daily. (Patient taking differently: Take 100 mg by mouth as needed.  ) Disp: 28 capsule Rfl: 0   IRON OR Take 1 tablet by mouth daily.  Disp:  Rfl:    Azelaic Acid (FINACEA) 15 % External Gel Apply to face daily Disp: 50 g BYPASS,OBESITY,SB RECONSTRUC     • LUMBAR EPIDURAL N/A 12/27/2017    Performed by Delphine Holland MD at [address] [place] [address] N/A 12/4/2017    Performed by Delphine Holland MD at [address] [place] [address] general and regional  NPO status verified and patient meets guidelines. Post-procedure pain management plan discussed with surgeon and patient.       Plan/risks discussed with: patient                Options, risks and benefits of anesthesia as outlined

## 2019-06-18 NOTE — PLAN OF CARE
Pt admitted from PACU. Pain managed with oral pain meds. Dr. Abhishek Ontiveros notified of consult and here to see him, orders rec'd. Left arm with immobilizer on, foam drsg, ice gel pack. Pt orientated to room and floor, instructed on IS and  ankle pumps.  Pt voi

## 2019-06-18 NOTE — OPERATIVE REPORT
[facility]    PATIENT'S NAME: Camille Zak   ATTENDING PHYSICIAN: Angel Biggs M.D. OPERATING PHYSICIAN: Angel Biggs M.D.    PATIENT ACCOUNT#:   [de-identified]    LOCATION:  PACU [facility] PACU 4 EDWP 10  MEDICAL RECORD #:   [id number]       DATE OF taken to the operating room and placed on the operating room table in the supine position. Balanced anesthetic is carried out. Transitioned to the left side of the bed. Bony prominences are protected. Neck is supported. Sterile prep and drape follows. appropriate planes. The glenosphere had been locked with a central locking screw. Copious irrigation is washed through the wound.   The deltoid is closed with a running 0 chromic stitch, subcutaneous tissue closed with 2-0 Vicryl, skin is closed with ster

## 2019-06-18 NOTE — ANESTHESIA POSTPROCEDURE EVALUATION
[address] [facility] Patient Status:  Surgery Admit - Inpt   Age/Gender 62year old male MRN [id number]   [facility] SURGERY Attending Qi Sood MD   Hosp Day # 0 PCP Eduardo Mason MD       Anesthesia Post-op Note

## 2019-06-18 NOTE — BRIEF OP NOTE
Pre-Operative Diagnosis: Primary osteoarthritis of left shoulder [M19.011]     Post-Operative Diagnosis: Primary osteoarthritis of left shoulder [M19.011]      Procedure Performed:   Procedure(s):  LEFT REVERSE SHOULDER ARTHROPLASTY    Surgeon(s) and Role:

## 2019-06-18 NOTE — INTERVAL H&P NOTE
Pre-op Diagnosis: Primary osteoarthritis of right shoulder [M19.011]    The above referenced H&P was reviewed by LACY Gallegos on 6/18/2019, the patient was examined and no significant changes have occurred in the patient's condition since the H&P wa

## 2019-06-19 VITALS
BODY MASS INDEX: 42.44 KG/M2 | TEMPERATURE: 98 F | HEIGHT: 71 IN | WEIGHT: 303.13 LBS | RESPIRATION RATE: 21 BRPM | OXYGEN SATURATION: 94 % | DIASTOLIC BLOOD PRESSURE: 95 MMHG | SYSTOLIC BLOOD PRESSURE: 155 MMHG | HEART RATE: 67 BPM

## 2019-06-19 PROCEDURE — 85027 COMPLETE CBC AUTOMATED: CPT | Performed by: ORTHOPAEDIC SURGERY

## 2019-06-19 PROCEDURE — 97165 OT EVAL LOW COMPLEX 30 MIN: CPT

## 2019-06-19 PROCEDURE — 97161 PT EVAL LOW COMPLEX 20 MIN: CPT

## 2019-06-19 PROCEDURE — 97535 SELF CARE MNGMENT TRAINING: CPT

## 2019-06-19 PROCEDURE — 97530 THERAPEUTIC ACTIVITIES: CPT

## 2019-06-19 NOTE — CM/SW NOTE
06/19/19 1100   CM/SW Referral Data   Referral Source Physician   Reason for Referral Discharge planning   Informant Patient;Edward Staff   Patient Info   Patient's Mental Status Alert;Oriented   Patient's [address]   Patient lives with Spo

## 2019-06-19 NOTE — PROGRESS NOTES
[facility] Hospitalist Progress Note                                                                   [address] [facility]  1/5/1960    SUBJECTIVE: no issues pain is controlled. Denies sob.       OBJE sulfate    Assessment/Plan:  Active Problems:    Left rotator cuff tear arthropathy      sp L reverse shoulder arthoplasty  - Plan per ortho. Continue PT/OT.   Pain is currently controlled.      Acute on chronic pain  - controlled with oral pain medication

## 2019-06-19 NOTE — PLAN OF CARE
Pain controlled with PO prn pain meds. Tape dressing to left shoulder is dry and intact. Immobilizer remains in place. Continuous tele monitoring per JAGRUTI protocol. VSS. Ambulated in hallway with standby assist. Voiding without difficulty. SCDs on.  Will con

## 2019-06-19 NOTE — PROGRESS NOTES
[address]  Orthopedic Surgery  Progress Note    Salvatore Scott Patient Status:  Inpatient    1960 MRN [id number]   [facility] Attending Fatou Barker MD   Hosp Day # 1 PCP Dolly Alvarez MD     SUBJECTIVE:  IN

## 2019-06-19 NOTE — CM/SW NOTE
AVS sent to [facility] via [address] prior to dc.      06/19/19 1423   Discharge disposition   Expected discharge disposition Home-Health   Name of Facillity/Home Care/Hospice   ([facility] )   Discharge transportation Private car

## 2019-06-19 NOTE — PLAN OF CARE
Pt seen by MD's this am, worked with therapy. OK to dc home per both. HH arranged by ZUNILDA. Aquacel dressing dry and intact. Immobilizer in place. Pt watched dc instruction video. Written and verbal dc instructions also given. Verbalized understanding.

## 2019-06-19 NOTE — PROGRESS NOTES
Post Op Day 1 Ortho Note    Status Post Nerve Block:  Type of Nerve Block: Left Interscalene  Single Injection Nerve Block    Post op review: No evidence of immediate block related complications and No paresthesia noted    Patient rates pain 2-3/10 front o

## 2019-06-19 NOTE — OCCUPATIONAL THERAPY NOTE
OCCUPATIONAL THERAPY QUICK EVALUATION - INPATIENT    Room Number: 379/379-A  Evaluation Date: 6/19/2019     Type of Evaluation: Quick Eval  Presenting Problem: (L reverse TSA)    Physician Order: IP Consult to Occupational Therapy  Reason for Therapy:  ADL Nikhil Sanchez MD at [address] [place] [address]   • DEBRIDE MASTOID CAVITY,SIMPLE     • EAR CARTILAGE HARVEST GRAFT N/A 1/11/2011    Performed by Alexa Lee at [address] [place] [address]   • ESOPHAGOGASTRODUODENOSCOPY (EGD) N/A 3/24/2017    Performed by Benedict [place] store, drives frequently)  Hand Dominance: Right  Drives: Yes  Patient Regularly Uses: Glasses(CPAP)    Prior Level of Function: independent. Lives with spouse who is s/p AVR.  Patient also lives with son who can assist as needed    SUBJECTIVE  \"I'll have remove and apply immobilizer, mod A needed. Patient able to don wide neck t shirt with min cues and min A. LB dressing completed with SBA to thread pants, max A for socks, min A for pants to waist over L hip. Toileting completed with mod I, no AD.    Func MODERATE - Comorbidities and min to mod modifications of tasks    Clinical Decision Making  LOW - Analysis of occupational profile, problem-focused assessments, limited treatment options    Overall Complexity  LOW     OT Discharge Recommendations: Home(fam

## 2019-06-19 NOTE — PHYSICAL THERAPY NOTE
PHYSICAL THERAPY QUICK EVALUATION - INPATIENT    Room Number: 379/379-A  Evaluation Date: 6/19/2019  Presenting Problem: S/p Left Reverse TSA on 06/18/19  Physician Order: PT Eval and Treat    Problem List  Active Problems:    Left rotator cuff tear arth Performed by Tim Smith MD at [address] N/A 1/11/2011    Performed by David Bazzi at [facility] [address]      10/06   • GASTRIC BYPASS,OBESITY,SB RE needed. SUBJECTIVE  \"I still sleep in recliner. My back does not like bed. \" Patient was participatory. No family present.     OBJECTIVE  Precautions: (Left UE in shoulder immobilizer, Surgical)  Fall Risk: Standard fall risk    WEIGHT BEARING RESTRICTIO score is based on FIM defination    Skilled Therapy Provided: Evaluation completed. Patient was educated & instructed in post surgical precautions,HEP+ donning of shoulder immobilizer. No family present for family training re : above. Issued handouts for same

## 2019-06-20 NOTE — PAYOR COMM NOTE
--------------  DISCHARGE REVIEW    Payor: Janay Rosa  #:  [id number]  Authorization Number: [id number]    Admit date: 6/18/19  Admit time:  1  Discharge Date: 6/19/2019  2:24 PM     Admitting Physician: Sofia Kelly MD  Attendin

## 2019-07-03 PROBLEM — Z96.612 S/P REVERSE TOTAL SHOULDER ARTHROPLASTY, LEFT: Status: ACTIVE | Noted: 2019-07-03

## 2019-09-18 PROCEDURE — 86803 HEPATITIS C AB TEST: CPT | Performed by: INTERNAL MEDICINE

## 2019-10-10 PROBLEM — M17.11 PRIMARY OSTEOARTHRITIS OF RIGHT KNEE: Status: ACTIVE | Noted: 2019-10-10

## 2019-12-04 NOTE — PAT NURSING NOTE
Pt has had surgery here before and tried to drink the gatorade with \"bad results\" refuses to take the gatorade, will take the tylenol as ordered.

## 2019-12-23 ENCOUNTER — HOSPITAL ENCOUNTER (OUTPATIENT)
Dept: PHYSICAL THERAPY | Facility: HOSPITAL | Age: 59
Discharge: HOME | End: 2019-12-23
Attending: ORTHOPAEDIC SURGERY
Payer: COMMERCIAL

## 2019-12-23 ENCOUNTER — LABORATORY ENCOUNTER (OUTPATIENT)
Dept: LAB | Facility: HOSPITAL | Age: 59
End: 2019-12-23
Payer: COMMERCIAL

## 2019-12-23 ENCOUNTER — APPOINTMENT (OUTPATIENT)
Dept: LAB | Facility: HOSPITAL | Age: 59
End: 2019-12-23
Payer: COMMERCIAL

## 2019-12-23 DIAGNOSIS — I10 ESSENTIAL HYPERTENSION, BENIGN: ICD-10-CM

## 2019-12-23 DIAGNOSIS — M17.11 PRIMARY OSTEOARTHRITIS OF RIGHT KNEE: ICD-10-CM

## 2019-12-23 DIAGNOSIS — G47.33 OSA (OBSTRUCTIVE SLEEP APNEA): ICD-10-CM

## 2019-12-23 DIAGNOSIS — J45.909 UNCOMPLICATED ASTHMA, UNSPECIFIED ASTHMA SEVERITY, UNSPECIFIED WHETHER PERSISTENT: ICD-10-CM

## 2019-12-23 DIAGNOSIS — Z01.818 PREOP EXAM FOR INTERNAL MEDICINE: ICD-10-CM

## 2019-12-23 PROCEDURE — 36415 COLL VENOUS BLD VENIPUNCTURE: CPT

## 2019-12-23 PROCEDURE — 86850 RBC ANTIBODY SCREEN: CPT

## 2019-12-23 PROCEDURE — 86900 BLOOD TYPING SEROLOGIC ABO: CPT

## 2019-12-23 PROCEDURE — 85730 THROMBOPLASTIN TIME PARTIAL: CPT

## 2019-12-23 PROCEDURE — 86901 BLOOD TYPING SEROLOGIC RH(D): CPT

## 2019-12-23 PROCEDURE — 80053 COMPREHEN METABOLIC PANEL: CPT

## 2019-12-23 PROCEDURE — 85610 PROTHROMBIN TIME: CPT

## 2019-12-23 PROCEDURE — 85025 COMPLETE CBC W/AUTO DIFF WBC: CPT

## 2020-01-03 NOTE — H&P
[address] [facility]  Orthopedic Surgery  HISTORY AND PHYSICAL EXAMINATION    Patient: Demetrius Larkin  Medical Record Number: [id number]    CHIEF COMPLAINT: Right knee pain and swelling    HPI:   Maci Salinas is a 62year old male followed in the office, numer EVERY 4 TO 6 HOURS AS NEEDED FOR WHEEZING. 1 Inhaler 5   • Azelaic Acid (FINACEA) 15 % External Gel Apply to face daily 50 g 3   • Multiple Vitamins-Minerals (OPTISOURCE POST BARIATRIC SURG) Oral Chew Tab Chew 2 tablets by mouth daily.      • Albuterol Sulf unspecified    • Arrhythmia     PHENTERMINE CAUSED PALPITATIONS   • Asthma    • Back problem     SCOLIOSIS, AND REPAIRED HERNIATED DISC   • BPH (benign prostatic hyperplasia)    • Cancer (HCC)     basal cell skin   • Complete tear of right rotator cuff 10/ HPI  SKIN: denies any unusual skin lesions or rashes  NEURO: denies numbness, tingling, or radiating pain  RESPIRATORY: No shortness of breath.    PSYCHIATRY: No depression or anxiety  All other systems reviewed and negative    EXAM:   GENERAL: well develop

## 2020-01-07 ENCOUNTER — ANESTHESIA EVENT (OUTPATIENT)
Dept: SURGERY | Facility: HOSPITAL | Age: 60
DRG: 470 | End: 2020-01-07
Payer: COMMERCIAL

## 2020-01-07 ENCOUNTER — ANESTHESIA (OUTPATIENT)
Dept: SURGERY | Facility: HOSPITAL | Age: 60
DRG: 470 | End: 2020-01-07
Payer: COMMERCIAL

## 2020-01-07 ENCOUNTER — HOSPITAL ENCOUNTER (INPATIENT)
Facility: HOSPITAL | Age: 60
LOS: 3 days | Discharge: NURSING, REHAB OR LONG-TERM CARE | DRG: 470 | End: 2020-01-10
Attending: ORTHOPAEDIC SURGERY | Admitting: ORTHOPAEDIC SURGERY
Payer: COMMERCIAL

## 2020-01-07 ENCOUNTER — APPOINTMENT (OUTPATIENT)
Dept: GENERAL RADIOLOGY | Facility: HOSPITAL | Age: 60
DRG: 470 | End: 2020-01-07
Attending: ORTHOPAEDIC SURGERY
Payer: COMMERCIAL

## 2020-01-07 DIAGNOSIS — M17.11 PRIMARY OSTEOARTHRITIS OF RIGHT KNEE: Primary | ICD-10-CM

## 2020-01-07 DIAGNOSIS — I10 ESSENTIAL HYPERTENSION, BENIGN: ICD-10-CM

## 2020-01-07 LAB — CREAT BLD-MCNC: 1.34 MG/DL (ref 0.7–1.3)

## 2020-01-07 PROCEDURE — 82565 ASSAY OF CREATININE: CPT | Performed by: ORTHOPAEDIC SURGERY

## 2020-01-07 PROCEDURE — 73560 X-RAY EXAM OF KNEE 1 OR 2: CPT | Performed by: ORTHOPAEDIC SURGERY

## 2020-01-07 PROCEDURE — 0SRC0J9 REPLACEMENT OF RIGHT KNEE JOINT WITH SYNTHETIC SUBSTITUTE, CEMENTED, OPEN APPROACH: ICD-10-PCS | Performed by: ORTHOPAEDIC SURGERY

## 2020-01-07 PROCEDURE — 3E0T3BZ INTRODUCTION OF ANESTHETIC AGENT INTO PERIPHERAL NERVES AND PLEXI, PERCUTANEOUS APPROACH: ICD-10-PCS | Performed by: ANESTHESIOLOGY

## 2020-01-07 PROCEDURE — 94660 CPAP INITIATION&MGMT: CPT

## 2020-01-07 DEVICE — PSN FEM CR CMT CCR STD SZ10 R: Type: IMPLANTABLE DEVICE | Site: KNEE | Status: FUNCTIONAL

## 2020-01-07 DEVICE — PSN STR HYB ST 14X+30 M: Type: IMPLANTABLE DEVICE | Site: KNEE | Status: FUNCTIONAL

## 2020-01-07 DEVICE — BIOMET BC R 1X40 US: Type: IMPLANTABLE DEVICE | Site: KNEE | Status: FUNCTIONAL

## 2020-01-07 DEVICE — PSN TIB STM 5 DEG SZ E R: Type: IMPLANTABLE DEVICE | Site: KNEE | Status: FUNCTIONAL

## 2020-01-07 DEVICE — PSN ALL POLY PAT PLY 35MM: Type: IMPLANTABLE DEVICE | Site: KNEE | Status: FUNCTIONAL

## 2020-01-07 RX ORDER — OXYCODONE HYDROCHLORIDE 5 MG/1
5 TABLET ORAL EVERY 4 HOURS PRN
Status: DISCONTINUED | OUTPATIENT
Start: 2020-01-07 | End: 2020-01-08

## 2020-01-07 RX ORDER — SODIUM CHLORIDE, SODIUM LACTATE, POTASSIUM CHLORIDE, CALCIUM CHLORIDE 600; 310; 30; 20 MG/100ML; MG/100ML; MG/100ML; MG/100ML
INJECTION, SOLUTION INTRAVENOUS CONTINUOUS
Status: DISCONTINUED | OUTPATIENT
Start: 2020-01-07 | End: 2020-01-07 | Stop reason: HOSPADM

## 2020-01-07 RX ORDER — PROCHLORPERAZINE EDISYLATE 5 MG/ML
10 INJECTION INTRAMUSCULAR; INTRAVENOUS EVERY 6 HOURS PRN
Status: DISCONTINUED | OUTPATIENT
Start: 2020-01-07 | End: 2020-01-08

## 2020-01-07 RX ORDER — NALOXONE HYDROCHLORIDE 0.4 MG/ML
80 INJECTION, SOLUTION INTRAMUSCULAR; INTRAVENOUS; SUBCUTANEOUS AS NEEDED
Status: DISCONTINUED | OUTPATIENT
Start: 2020-01-07 | End: 2020-01-07 | Stop reason: HOSPADM

## 2020-01-07 RX ORDER — SCOLOPAMINE TRANSDERMAL SYSTEM 1 MG/1
1 PATCH, EXTENDED RELEASE TRANSDERMAL ONCE
Status: COMPLETED | OUTPATIENT
Start: 2020-01-07 | End: 2020-01-10

## 2020-01-07 RX ORDER — DIPHENHYDRAMINE HYDROCHLORIDE 50 MG/ML
25 INJECTION INTRAMUSCULAR; INTRAVENOUS ONCE AS NEEDED
Status: ACTIVE | OUTPATIENT
Start: 2020-01-07 | End: 2020-01-07

## 2020-01-07 RX ORDER — ALLOPURINOL 100 MG/1
200 TABLET ORAL DAILY
COMMUNITY
End: 2020-03-06

## 2020-01-07 RX ORDER — HYDROMORPHONE HYDROCHLORIDE 1 MG/ML
INJECTION, SOLUTION INTRAMUSCULAR; INTRAVENOUS; SUBCUTANEOUS
Status: COMPLETED
Start: 2020-01-07 | End: 2020-01-07

## 2020-01-07 RX ORDER — LIDOCAINE HYDROCHLORIDE 10 MG/ML
INJECTION, SOLUTION EPIDURAL; INFILTRATION; INTRACAUDAL; PERINEURAL AS NEEDED
Status: DISCONTINUED | OUTPATIENT
Start: 2020-01-07 | End: 2020-01-07 | Stop reason: SURG

## 2020-01-07 RX ORDER — MIDAZOLAM HYDROCHLORIDE 1 MG/ML
INJECTION INTRAMUSCULAR; INTRAVENOUS AS NEEDED
Status: DISCONTINUED | OUTPATIENT
Start: 2020-01-07 | End: 2020-01-07 | Stop reason: SURG

## 2020-01-07 RX ORDER — BISACODYL 10 MG
10 SUPPOSITORY, RECTAL RECTAL
Status: DISCONTINUED | OUTPATIENT
Start: 2020-01-07 | End: 2020-01-10

## 2020-01-07 RX ORDER — ONDANSETRON 2 MG/ML
4 INJECTION INTRAMUSCULAR; INTRAVENOUS EVERY 4 HOURS PRN
Status: DISCONTINUED | OUTPATIENT
Start: 2020-01-07 | End: 2020-01-10

## 2020-01-07 RX ORDER — ROCURONIUM BROMIDE 10 MG/ML
INJECTION, SOLUTION INTRAVENOUS AS NEEDED
Status: DISCONTINUED | OUTPATIENT
Start: 2020-01-07 | End: 2020-01-07 | Stop reason: SURG

## 2020-01-07 RX ORDER — HYDROCODONE BITARTRATE AND ACETAMINOPHEN 10; 325 MG/1; MG/1
1 TABLET ORAL AS NEEDED
Status: DISCONTINUED | OUTPATIENT
Start: 2020-01-07 | End: 2020-01-07 | Stop reason: HOSPADM

## 2020-01-07 RX ORDER — MONTELUKAST SODIUM 10 MG/1
10 TABLET ORAL NIGHTLY
COMMUNITY
End: 2020-03-06

## 2020-01-07 RX ORDER — HYDROCHLOROTHIAZIDE 25 MG/1
12.5 TABLET ORAL DAILY
Status: DISCONTINUED | OUTPATIENT
Start: 2020-01-08 | End: 2020-01-10

## 2020-01-07 RX ORDER — HYDROMORPHONE HYDROCHLORIDE 1 MG/ML
0.2 INJECTION, SOLUTION INTRAMUSCULAR; INTRAVENOUS; SUBCUTANEOUS EVERY 2 HOUR PRN
Status: ACTIVE | OUTPATIENT
Start: 2020-01-07 | End: 2020-01-09

## 2020-01-07 RX ORDER — OXYCODONE HYDROCHLORIDE 10 MG/1
10 TABLET ORAL EVERY 4 HOURS PRN
Status: DISCONTINUED | OUTPATIENT
Start: 2020-01-07 | End: 2020-01-08

## 2020-01-07 RX ORDER — POLYETHYLENE GLYCOL 3350 17 G/17G
17 POWDER, FOR SOLUTION ORAL DAILY PRN
Status: DISCONTINUED | OUTPATIENT
Start: 2020-01-07 | End: 2020-01-10

## 2020-01-07 RX ORDER — GLYCOPYRROLATE 0.2 MG/ML
INJECTION, SOLUTION INTRAMUSCULAR; INTRAVENOUS AS NEEDED
Status: DISCONTINUED | OUTPATIENT
Start: 2020-01-07 | End: 2020-01-07 | Stop reason: SURG

## 2020-01-07 RX ORDER — ALLOPURINOL 100 MG/1
200 TABLET ORAL DAILY
Status: DISCONTINUED | OUTPATIENT
Start: 2020-01-08 | End: 2020-01-10

## 2020-01-07 RX ORDER — DIPHENHYDRAMINE HCL 25 MG
25 CAPSULE ORAL EVERY 4 HOURS PRN
Status: DISCONTINUED | OUTPATIENT
Start: 2020-01-07 | End: 2020-01-10

## 2020-01-07 RX ORDER — METOCLOPRAMIDE HYDROCHLORIDE 5 MG/ML
10 INJECTION INTRAMUSCULAR; INTRAVENOUS EVERY 6 HOURS PRN
Status: DISCONTINUED | OUTPATIENT
Start: 2020-01-07 | End: 2020-01-10

## 2020-01-07 RX ORDER — SODIUM CHLORIDE 9 MG/ML
INJECTION, SOLUTION INTRAVENOUS CONTINUOUS PRN
Status: DISCONTINUED | OUTPATIENT
Start: 2020-01-07 | End: 2020-01-07 | Stop reason: SURG

## 2020-01-07 RX ORDER — MEPERIDINE HYDROCHLORIDE 25 MG/ML
12.5 INJECTION INTRAMUSCULAR; INTRAVENOUS; SUBCUTANEOUS AS NEEDED
Status: DISCONTINUED | OUTPATIENT
Start: 2020-01-07 | End: 2020-01-07 | Stop reason: HOSPADM

## 2020-01-07 RX ORDER — ACETAMINOPHEN 500 MG
1000 TABLET ORAL ONCE
Status: DISCONTINUED | OUTPATIENT
Start: 2020-01-07 | End: 2020-01-07 | Stop reason: HOSPADM

## 2020-01-07 RX ORDER — DIPHENHYDRAMINE HYDROCHLORIDE 50 MG/ML
12.5 INJECTION INTRAMUSCULAR; INTRAVENOUS EVERY 4 HOURS PRN
Status: DISCONTINUED | OUTPATIENT
Start: 2020-01-07 | End: 2020-01-10

## 2020-01-07 RX ORDER — ACETAMINOPHEN 325 MG/1
TABLET ORAL
Status: COMPLETED
Start: 2020-01-07 | End: 2020-01-07

## 2020-01-07 RX ORDER — LOSARTAN POTASSIUM 100 MG/1
100 TABLET ORAL DAILY
Status: DISCONTINUED | OUTPATIENT
Start: 2020-01-08 | End: 2020-01-10

## 2020-01-07 RX ORDER — ZOLPIDEM TARTRATE 5 MG/1
5 TABLET ORAL NIGHTLY PRN
Status: DISCONTINUED | OUTPATIENT
Start: 2020-01-07 | End: 2020-01-08

## 2020-01-07 RX ORDER — ACETAMINOPHEN 325 MG/1
650 TABLET ORAL 4 TIMES DAILY
Status: DISCONTINUED | OUTPATIENT
Start: 2020-01-07 | End: 2020-01-08

## 2020-01-07 RX ORDER — METOCLOPRAMIDE HYDROCHLORIDE 5 MG/ML
10 INJECTION INTRAMUSCULAR; INTRAVENOUS AS NEEDED
Status: DISCONTINUED | OUTPATIENT
Start: 2020-01-07 | End: 2020-01-07 | Stop reason: HOSPADM

## 2020-01-07 RX ORDER — HYDROMORPHONE HYDROCHLORIDE 1 MG/ML
0.4 INJECTION, SOLUTION INTRAMUSCULAR; INTRAVENOUS; SUBCUTANEOUS EVERY 5 MIN PRN
Status: DISCONTINUED | OUTPATIENT
Start: 2020-01-07 | End: 2020-01-07 | Stop reason: HOSPADM

## 2020-01-07 RX ORDER — ONDANSETRON 2 MG/ML
INJECTION INTRAMUSCULAR; INTRAVENOUS AS NEEDED
Status: DISCONTINUED | OUTPATIENT
Start: 2020-01-07 | End: 2020-01-07 | Stop reason: SURG

## 2020-01-07 RX ORDER — SODIUM PHOSPHATE, DIBASIC AND SODIUM PHOSPHATE, MONOBASIC 7; 19 G/133ML; G/133ML
1 ENEMA RECTAL ONCE AS NEEDED
Status: DISCONTINUED | OUTPATIENT
Start: 2020-01-07 | End: 2020-01-10

## 2020-01-07 RX ORDER — ONDANSETRON 2 MG/ML
4 INJECTION INTRAMUSCULAR; INTRAVENOUS AS NEEDED
Status: DISCONTINUED | OUTPATIENT
Start: 2020-01-07 | End: 2020-01-07 | Stop reason: HOSPADM

## 2020-01-07 RX ORDER — HYDROMORPHONE HYDROCHLORIDE 1 MG/ML
0.8 INJECTION, SOLUTION INTRAMUSCULAR; INTRAVENOUS; SUBCUTANEOUS EVERY 2 HOUR PRN
Status: ACTIVE | OUTPATIENT
Start: 2020-01-07 | End: 2020-01-09

## 2020-01-07 RX ORDER — NEOSTIGMINE METHYLSULFATE 1 MG/ML
INJECTION INTRAVENOUS AS NEEDED
Status: DISCONTINUED | OUTPATIENT
Start: 2020-01-07 | End: 2020-01-07 | Stop reason: SURG

## 2020-01-07 RX ORDER — HYDROCODONE BITARTRATE AND ACETAMINOPHEN 10; 325 MG/1; MG/1
2 TABLET ORAL AS NEEDED
Status: DISCONTINUED | OUTPATIENT
Start: 2020-01-07 | End: 2020-01-07 | Stop reason: HOSPADM

## 2020-01-07 RX ORDER — ACETAMINOPHEN 325 MG/1
650 TABLET ORAL EVERY 6 HOURS PRN
Status: DISCONTINUED | OUTPATIENT
Start: 2020-01-07 | End: 2020-01-10

## 2020-01-07 RX ORDER — MELATONIN
325
Status: DISCONTINUED | OUTPATIENT
Start: 2020-01-08 | End: 2020-01-10

## 2020-01-07 RX ORDER — TIZANIDINE 4 MG/1
4 TABLET ORAL 3 TIMES DAILY PRN
Status: DISCONTINUED | OUTPATIENT
Start: 2020-01-07 | End: 2020-01-08

## 2020-01-07 RX ORDER — SODIUM CHLORIDE 9 MG/ML
INJECTION, SOLUTION INTRAVENOUS CONTINUOUS
Status: DISCONTINUED | OUTPATIENT
Start: 2020-01-07 | End: 2020-01-09

## 2020-01-07 RX ORDER — CLONIDINE HYDROCHLORIDE 0.1 MG/1
0.1 TABLET ORAL 2 TIMES DAILY
Status: DISCONTINUED | OUTPATIENT
Start: 2020-01-07 | End: 2020-01-08

## 2020-01-07 RX ORDER — HYDROMORPHONE HYDROCHLORIDE 1 MG/ML
0.4 INJECTION, SOLUTION INTRAMUSCULAR; INTRAVENOUS; SUBCUTANEOUS EVERY 2 HOUR PRN
Status: DISPENSED | OUTPATIENT
Start: 2020-01-07 | End: 2020-01-09

## 2020-01-07 RX ORDER — DOCUSATE SODIUM 100 MG/1
100 CAPSULE, LIQUID FILLED ORAL 2 TIMES DAILY
Status: DISCONTINUED | OUTPATIENT
Start: 2020-01-07 | End: 2020-01-10

## 2020-01-07 RX ORDER — ALBUTEROL SULFATE 2.5 MG/3ML
2.5 SOLUTION RESPIRATORY (INHALATION) EVERY 4 HOURS PRN
Status: DISCONTINUED | OUTPATIENT
Start: 2020-01-07 | End: 2020-01-10

## 2020-01-07 RX ORDER — OXYCODONE HYDROCHLORIDE 15 MG/1
15 TABLET ORAL EVERY 4 HOURS PRN
Status: DISCONTINUED | OUTPATIENT
Start: 2020-01-07 | End: 2020-01-08

## 2020-01-07 RX ORDER — MONTELUKAST SODIUM 10 MG/1
10 TABLET ORAL DAILY
Status: DISCONTINUED | OUTPATIENT
Start: 2020-01-08 | End: 2020-01-10

## 2020-01-07 RX ADMIN — SODIUM CHLORIDE: 9 INJECTION, SOLUTION INTRAVENOUS at 13:50:00

## 2020-01-07 RX ADMIN — MIDAZOLAM HYDROCHLORIDE 4 MG: 1 INJECTION INTRAMUSCULAR; INTRAVENOUS at 12:48:00

## 2020-01-07 RX ADMIN — SODIUM CHLORIDE: 9 INJECTION, SOLUTION INTRAVENOUS at 15:18:00

## 2020-01-07 RX ADMIN — LIDOCAINE HYDROCHLORIDE 50 MG: 10 INJECTION, SOLUTION EPIDURAL; INFILTRATION; INTRACAUDAL; PERINEURAL at 13:20:00

## 2020-01-07 RX ADMIN — GLYCOPYRROLATE 0.6 MG: 0.2 INJECTION, SOLUTION INTRAMUSCULAR; INTRAVENOUS at 15:08:00

## 2020-01-07 RX ADMIN — ROCURONIUM BROMIDE 50 MG: 10 INJECTION, SOLUTION INTRAVENOUS at 13:20:00

## 2020-01-07 RX ADMIN — ONDANSETRON 4 MG: 2 INJECTION INTRAMUSCULAR; INTRAVENOUS at 14:49:00

## 2020-01-07 RX ADMIN — NEOSTIGMINE METHYLSULFATE 3 MG: 1 INJECTION INTRAVENOUS at 15:08:00

## 2020-01-07 NOTE — OPERATIVE REPORT
[facility]    PATIENT'S NAME: Susan Herrera   ATTENDING PHYSICIAN: Alison Meadows M.D. OPERATING PHYSICIAN: Alison Meadows M.D.    PATIENT ACCOUNT#:   [de-identified]    LOCATION:  [facility] OR [place] ROOMS 8 EDWP 10  MEDICAL RECORD #:   [id number] tibial alignment system is used. The cut is based off the deficient medial tibial plateau. Soft tissue balance proceeds. Then 10 mm of additional resection is deemed necessary. A 10 mm spacer block provides good soft tissue balance.   Size E tibia is pl

## 2020-01-07 NOTE — ANESTHESIA PROCEDURE NOTES
Airway  Date/Time: 1/7/2020 1:20 PM  Urgency: elective    Difficult airway    General Information and Staff    Patient location during procedure: OR  Anesthesiologist: Norma Beckford MD  Performed: anesthesiologist     Indications and Patient Condition

## 2020-01-07 NOTE — ANESTHESIA PREPROCEDURE EVALUATION
PRE-OP EVALUATION    Patient Name: Sussy Saenz    Pre-op Diagnosis: Primary osteoarthritis of right knee [M17.11]    Procedure(s):  RIGHT TOTAL KNEE ARTHROPLASTY    Surgeon(s) and Role:     * Tosha Angulo MD - Primary    Pre-op vitals reviewed. Disp: 135 tablet, Rfl: 3  Glucosamine HCl (GLUCOSAMINE OR), Take 2 tablets by mouth daily. , Disp: , Rfl:   Cholecalciferol (VITAMIN D3 OR), Take 1 tablet by mouth daily. , Disp: , Rfl:   aspirin 81 MG Oral Tab, Take 81 mg by mouth daily. , Disp: , Rfl:   t Performed by Jaja Ingram MD at [facility] 7/2/2014    Performed by Lio Nj MD at [facility] N/A 1/11/2011    Performed by Elizabeth Ohara at [place] [address] reviewed.   Lab Results   Component Value Date    WBC 7.3 12/23/2019    RBC 4.89 12/23/2019    HGB 15.1 12/23/2019    HCT 45.3 12/23/2019    MCV 92.6 12/23/2019    MCH 30.9 12/23/2019    MCHC 33.3 12/23/2019    RDW 12.9 12/23/2019    .0 12/23/2019

## 2020-01-07 NOTE — INTERVAL H&P NOTE
Pre-op Diagnosis: Primary osteoarthritis of right knee [M17.11]    The above referenced H&P was reviewed by Dali Yo MD on 1/7/2020, the patient was examined and no significant changes have occurred in the patient's condition since the H&P was perf

## 2020-01-07 NOTE — CONSULTS
[facility] Hospitalist Initial Consult       Reason for consult: Medical Management sp R TKA      History of Present Illness: Patient is a 56year old male with PMH sig for HTN, gout,  Asthma, and morbid obesity who presents sp R TKA.    He tolerated the procedure tablet by mouth daily. , Disp: , Rfl:   Multiple Vitamins-Minerals (OPTISOURCE POST BARIATRIC SURG) Oral Chew Tab, Chew 2 tablets by mouth daily. , Disp: , Rfl:   [DISCONTINUED] ALLOPURINOL 100 MG Oral Tab, TAKE 2 TABLETS BY MOUTH EVERY DAY, Disp: 180 tablet • Complete tear of right rotator cuff 10/18/2018    10.2018 mri   • Diverticulosis of large intestine    • Elevated PSA     neg prostate biopsy 2012   • Esophageal reflux    • Extrinsic asthma, unspecified     ALLERGY INDUCED  ASTHMA   • Gout    • Gout PERCUTANEOUS DISCECTOMY 1 LEVEL N/A 2/1/2018    Performed by Chelsey Vaughan MD at [address] [place] [address] MAIN OR   • MASTOIDECTOMY,SIMPLE      mastoid surgery   • NASAL SEPTOPLASTY WITH SUBMUCOUS RESECTION INFERIOR Bilateral 6/23/2010    Performed by Angelique Hill MD at [facility] MMM  Pulm: Lungs clear bilaterally, normal respiratory effort  CV: Heart with regular rate and rhythm, no murmur. Normal PMI. Abd: Abdomen soft, nontender, nondistended, no organomegaly, bowel sounds present.  Morbidly obese  MSK: Full range of motion i

## 2020-01-07 NOTE — ANESTHESIA PROCEDURE NOTES
Regional Block  Performed by: Jamison Leung MD  Authorized by: Jamison Leung MD       General Information and Staff    Start Time:  1/7/2020 1:35 PM  End Time:  1/7/2020 1:37 PM  Anesthesiologist:  Jamison Leung MD  Performed by:   Anesthesio

## 2020-01-07 NOTE — BRIEF OP NOTE
Pre-Operative Diagnosis: Primary osteoarthritis of right knee [M17.11]     Post-Operative Diagnosis: Primary osteoarthritis of right knee [M17.11]      Procedure Performed:   Procedure(s):  RIGHT TOTAL KNEE ARTHROPLASTY    Surgeon(s) and Role:     * Rick

## 2020-01-07 NOTE — ANESTHESIA PROCEDURE NOTES
Spinal Block  Performed by: Norma Beckford MD  Authorized by: Norma Beckford MD       General Information and Staff    Start Time:  1/7/2020 12:45 PM  End Time:  1/7/2020 1:10 PM  Anesthesiologist:  Norma Beckford MD  Performed by:   Anesthesiol

## 2020-01-07 NOTE — ANESTHESIA POSTPROCEDURE EVALUATION
[address] [facility] Patient Status:  Outpatient in a Bed   Age/Gender 56year old male MRN [id number]   [facility] SURGERY Attending Jose Eaton MD   Hosp Day # 0 PCP Terrance Mulligan MD       Anesthesia Post-op Note

## 2020-01-07 NOTE — ANESTHESIA PROCEDURE NOTES
Regional Block  Performed by: Gale Gibson MD  Authorized by: Gale Gibson MD       General Information and Staff    Start Time:  1/7/2020 1:37 PM  End Time:  1/7/2020 1:39 PM  Anesthesiologist:  Gale Gibson MD  Performed by:   Anesthesio

## 2020-01-08 LAB
ANION GAP SERPL CALC-SCNC: 7 MMOL/L (ref 0–18)
BUN BLD-MCNC: 16 MG/DL (ref 7–18)
BUN/CREAT SERPL: 10.7 (ref 10–20)
CALCIUM BLD-MCNC: 8.6 MG/DL (ref 8.5–10.1)
CHLORIDE SERPL-SCNC: 107 MMOL/L (ref 98–112)
CO2 SERPL-SCNC: 25 MMOL/L (ref 21–32)
CREAT BLD-MCNC: 1.5 MG/DL (ref 0.7–1.3)
DEPRECATED RDW RBC AUTO: 43.2 FL (ref 35.1–46.3)
ERYTHROCYTE [DISTWIDTH] IN BLOOD BY AUTOMATED COUNT: 12.5 % (ref 11–15)
GLUCOSE BLD-MCNC: 133 MG/DL (ref 70–99)
HCT VFR BLD AUTO: 41.8 % (ref 39–53)
HGB BLD-MCNC: 13.6 G/DL (ref 13–17.5)
MCH RBC QN AUTO: 30.7 PG (ref 26–34)
MCHC RBC AUTO-ENTMCNC: 32.5 G/DL (ref 31–37)
MCV RBC AUTO: 94.4 FL (ref 80–100)
OSMOLALITY SERPL CALC.SUM OF ELEC: 291 MOSM/KG (ref 275–295)
PLATELET # BLD AUTO: 146 10(3)UL (ref 150–450)
POTASSIUM SERPL-SCNC: 4.4 MMOL/L (ref 3.5–5.1)
RBC # BLD AUTO: 4.43 X10(6)UL (ref 4.3–5.7)
SODIUM SERPL-SCNC: 139 MMOL/L (ref 136–145)
WBC # BLD AUTO: 8.1 X10(3) UL (ref 4–11)

## 2020-01-08 PROCEDURE — 85027 COMPLETE CBC AUTOMATED: CPT | Performed by: ORTHOPAEDIC SURGERY

## 2020-01-08 PROCEDURE — 80048 BASIC METABOLIC PNL TOTAL CA: CPT | Performed by: ORTHOPAEDIC SURGERY

## 2020-01-08 PROCEDURE — 97166 OT EVAL MOD COMPLEX 45 MIN: CPT

## 2020-01-08 PROCEDURE — 97530 THERAPEUTIC ACTIVITIES: CPT

## 2020-01-08 PROCEDURE — 97535 SELF CARE MNGMENT TRAINING: CPT

## 2020-01-08 PROCEDURE — 97110 THERAPEUTIC EXERCISES: CPT | Performed by: PHYSICAL THERAPIST

## 2020-01-08 PROCEDURE — 97162 PT EVAL MOD COMPLEX 30 MIN: CPT | Performed by: PHYSICAL THERAPIST

## 2020-01-08 RX ORDER — OXYCODONE HYDROCHLORIDE 5 MG/1
5 TABLET ORAL EVERY 4 HOURS PRN
Status: DISCONTINUED | OUTPATIENT
Start: 2020-01-08 | End: 2020-01-09

## 2020-01-08 RX ORDER — HYDRALAZINE HYDROCHLORIDE 25 MG/1
25 TABLET, FILM COATED ORAL EVERY 8 HOURS PRN
Status: DISCONTINUED | OUTPATIENT
Start: 2020-01-08 | End: 2020-01-08

## 2020-01-08 RX ORDER — HYDRALAZINE HYDROCHLORIDE 20 MG/ML
15 INJECTION INTRAMUSCULAR; INTRAVENOUS EVERY 6 HOURS PRN
Status: DISCONTINUED | OUTPATIENT
Start: 2020-01-08 | End: 2020-01-08

## 2020-01-08 RX ORDER — OXYCODONE HYDROCHLORIDE 10 MG/1
10 TABLET ORAL EVERY 4 HOURS PRN
Status: DISCONTINUED | OUTPATIENT
Start: 2020-01-08 | End: 2020-01-09

## 2020-01-08 RX ORDER — HYDRALAZINE HYDROCHLORIDE 50 MG/1
50 TABLET, FILM COATED ORAL EVERY 8 HOURS PRN
Status: DISCONTINUED | OUTPATIENT
Start: 2020-01-08 | End: 2020-01-08

## 2020-01-08 RX ORDER — CLONIDINE HYDROCHLORIDE 0.2 MG/1
0.2 TABLET ORAL 2 TIMES DAILY
Status: DISCONTINUED | OUTPATIENT
Start: 2020-01-08 | End: 2020-01-10

## 2020-01-08 RX ORDER — OXYCODONE HYDROCHLORIDE 15 MG/1
15 TABLET ORAL EVERY 4 HOURS PRN
Status: DISCONTINUED | OUTPATIENT
Start: 2020-01-08 | End: 2020-01-09

## 2020-01-08 RX ORDER — TRAMADOL HYDROCHLORIDE 50 MG/1
50 TABLET ORAL EVERY 12 HOURS
Status: DISCONTINUED | OUTPATIENT
Start: 2020-01-08 | End: 2020-01-10

## 2020-01-08 RX ORDER — LABETALOL HYDROCHLORIDE 5 MG/ML
15 INJECTION, SOLUTION INTRAVENOUS EVERY 6 HOURS PRN
Status: DISCONTINUED | OUTPATIENT
Start: 2020-01-08 | End: 2020-01-10

## 2020-01-08 RX ORDER — ACETAMINOPHEN 325 MG/1
650 TABLET ORAL 4 TIMES DAILY
Status: DISCONTINUED | OUTPATIENT
Start: 2020-01-08 | End: 2020-01-09

## 2020-01-08 RX ORDER — TIZANIDINE 4 MG/1
4 TABLET ORAL EVERY 6 HOURS PRN
Status: DISCONTINUED | OUTPATIENT
Start: 2020-01-08 | End: 2020-01-10

## 2020-01-08 RX ORDER — TIZANIDINE 2 MG/1
2 TABLET ORAL EVERY 6 HOURS PRN
Status: DISCONTINUED | OUTPATIENT
Start: 2020-01-08 | End: 2020-01-10

## 2020-01-08 RX ORDER — HYDRALAZINE HYDROCHLORIDE 20 MG/ML
10 INJECTION INTRAMUSCULAR; INTRAVENOUS EVERY 2 HOUR PRN
Status: DISCONTINUED | OUTPATIENT
Start: 2020-01-08 | End: 2020-01-10

## 2020-01-08 NOTE — RESPIRATORY THERAPY NOTE
JAGRUTI Equipment Usage Summary :            Set Mode : CPAP W FLEX          Usage in Hours:6;54          90% Pressure (EPAP) : 8           90% Insp Pressure (IPAP);           AHI : 0.3          Supplemental Oxygen :  3    LPM

## 2020-01-08 NOTE — CM/SW NOTE
01/08/20 1500   CM/SW Referral Data   Referral Source Social Work (self-referral)   Reason for Referral Discharge planning   Informant Patient;Edward Staff     HOME SITUATION  Type of Home: House   Home Layout: One level  Stairs to Enter : (4  lives in

## 2020-01-08 NOTE — PROGRESS NOTES
[facility] Hospitalist Progress Note                                                                   [address] [facility]  1/5/1960    SUBJECTIVE:  Pt seen and examined.   Noted to have elevated BP's Succinate ER  150 mg Oral Daily Beta Blocker   • Montelukast Sodium  10 mg Oral Daily     Continuous Infusions:   • sodium chloride 83 mL/hr at 01/08/20 1043     PRN: tiZANidine HCl **OR** tiZANidine HCl, oxyCODONE HCl **OR** oxyCODONE HCl **OR** oxyCODONE

## 2020-01-08 NOTE — PHYSICAL THERAPY NOTE
PHYSICAL THERAPY KNEE EVALUATION - INPATIENT     Room Number: 379/379-A  Evaluation Date: 1/8/2020  Type of Evaluation: Initial  Physician Order: PT Eval and Treat    Presenting Problem: (s/p right TKR )  Reason for Therapy: Mobility Dysfunction and Discha Leo   • DEBRIDE MASTOID CAVITY,SIMPLE     • EAR CARTILAGE HARVEST GRAFT N/A 1/11/2011    Performed by Deanna Farah at [facility]   • ESOPHAGOGASTRODUODENOSCOPY (EGD) N/A 3/24/2017    Performed by Elkin Brown MD at [facility] ENDOSCOPY Yes  Patient Owned Equipment: Rolling walker;Cane  Patient Regularly Uses: Reading glasses    Prior Level of [place]: Amb without assistive device, sleep in recliner chair. Amb without AD, but does have walkers, cane from prior surgeries.      SUBJECT bedclothes, sheets and blankets)?: A Lot   -   Sitting down on and standing up from a chair with arms (e.g., wheelchair, bedside commode, etc.): A Lot   -   Moving from lying on back to sitting on the side of the bed?: A Lot   How much help from another pe stabilizes we will consider bedside treatment to help facilitate improvement.     Exercise/Education Provided:  Bed mobility  Body mechanics  Energy conservation  Functional activity tolerated  Gait training  Manual therapy  Neuromuscular re-educate  Postur therapy both bedside and as group. .  Functional outcome measures completed include The AM-PAC '6-Clicks' Inpatient Basic Mobility Short Form was completed and this patient is demonstrating a 81% degree of impairment in mobility.  Research supports that pa

## 2020-01-08 NOTE — CONSULTS
[address] [place] [address] Patient Status:  Inpatient    1960 MRN [id number]   [facility] 3SW-A Attending Fatou Barker MD   Hosp Day # 1 PCP Dolly Alvarez MD     Patient Identification  Salvatore Scott is a 56 year HERNIATED DISC   • BPH (benign prostatic hyperplasia)    • Cancer (HCC)     basal cell skin   • Complete tear of right rotator cuff 10/18/2018    10.2018 mri   • Diverticulosis of large intestine    • Elevated PSA     neg prostate biopsy 2012   • Esophagea Performed by Paramjit Pham MD at [address] [facility] 11/16/2017    Performed by Paramjit Pham MD at [address] [facility] DISCECTOMY 1 LEVEL N/A 2/1/2018    Performed by Shalonda Castro, injection 0.4 mg, 0.4 mg, Intravenous, Q2H PRN    Or  HYDROmorphone HCl (DILAUDID) 1 MG/ML injection 0.8 mg, 0.8 mg, Intravenous, Q2H PRN  acetaminophen (TYLENOL) tab 650 mg, 650 mg, Oral, Q6H PRN  [] sodium chloride 0.9% IV bolus 500 mL, 500 mL, In (CYKLOKAPRON) 3,000 mg in sodium chloride 0.9% 100 mL IRRIGATION ONLY (NOT FOR IV USE), 3,000 mg, Irrigation, Once (Intra-Op)        Social History    Tobacco Use      Smoking status: Former Smoker        Packs/day: 1.50        Years: 20.00        Pack yea Normocephalic, without obvious abnormality, atraumatic. Eyes:  Conjunctivae/lids clear. PERRL, EOMs intact. Vision functional.   Ears/Nose/Throat: Hearing intact. Lips, mucosa, and tongue normal. Teeth and gums normal. Moist mucous membranes.      Neck: N for contractures and stiffness with consequent functional impairments.     Risk for thromboembolic disease with need for careful DVT prophylaxis, potential for bleeding and hematoma or hemarthrosis (and aggravating anemia) with anticoagulation    PLAN:

## 2020-01-08 NOTE — PLAN OF CARE
PT AOX4 this PM. VSS, using CPAP at night. BP sustained as hypertensive, contacted Dr. Zambrano April on call for prn antihypertensive, received prn order. Mild to moderate complaints of pain treated with prn po pain meds. IV fluids infusing.  Pt explains his R l

## 2020-01-08 NOTE — PHYSICAL THERAPY NOTE
Per RN , pt not appropriate for AM session 2/2 BP. Will re-attempt this afternoon as appropriate and as schedule permits. PM, pt still not appropriate to participate in skilled PT.

## 2020-01-08 NOTE — OCCUPATIONAL THERAPY NOTE
OCCUPATIONAL THERAPY EVALUATION - INPATIENT     Room Number: 379/379-A  Evaluation Date: 1/8/2020  Type of Evaluation: Initial  Presenting Problem: (R TKA)    Physician Order: IP Consult to Occupational Therapy  Reason for Therapy: ADL/IADL Dysfunction and Performed by Caro Naranjo MD at [address] [place] [address]   • DEBRIDE MASTOID CAVITY,SIMPLE     • EAR CARTILAGE HARVEST GRAFT N/A 1/11/2011    Performed by Ashley Rivera at [address] [place] [address]   • ESOPHAGOGASTRODUODENOSCOPY (EGD) N/A 3/24/2017 height toilet  Shower/Tub and Equipment: Walk-in shower; Tub-shower combo; Tub transfer bench;Hand-held showerhead       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      The patient is functioning below his previous functional level and would benefit from skilled inpatient OT to address the above deficits, maximizing patient’s ability to return safely to his prior level of with supervision

## 2020-01-08 NOTE — PROGRESS NOTES
[facility] [address] [place] [address]  Orthopedic Surgery  Progress Note    Yumiko Schneider Patient Status:  Outpatient in a Bed    1960 MRN [id number]   [facility] Attending Diandra White MD   Hosp Day # 0 PCP Bryanna Banda MD     SUBJE CPM:  45° and comfortable. LABORATORY:  Recent Labs   Lab 01/08/20  0509   RBC 4.43   HGB 13.6   HCT 41.8   MCV 94.4   MCH 30.7   MCHC 32.5   RDW 12.5   WBC 8.1   .0*      No results for input(s): PTP, INR in the last 168 hours.       ASSESS

## 2020-01-09 LAB
ANION GAP SERPL CALC-SCNC: 7 MMOL/L (ref 0–18)
BASOPHILS # BLD AUTO: 0.03 X10(3) UL (ref 0–0.2)
BASOPHILS NFR BLD AUTO: 0.3 %
BUN BLD-MCNC: 11 MG/DL (ref 7–18)
BUN/CREAT SERPL: 9.7 (ref 10–20)
CALCIUM BLD-MCNC: 9.4 MG/DL (ref 8.5–10.1)
CHLORIDE SERPL-SCNC: 106 MMOL/L (ref 98–112)
CO2 SERPL-SCNC: 25 MMOL/L (ref 21–32)
CREAT BLD-MCNC: 1.13 MG/DL (ref 0.7–1.3)
DEPRECATED RDW RBC AUTO: 42.1 FL (ref 35.1–46.3)
EOSINOPHIL # BLD AUTO: 0.01 X10(3) UL (ref 0–0.7)
EOSINOPHIL NFR BLD AUTO: 0.1 %
ERYTHROCYTE [DISTWIDTH] IN BLOOD BY AUTOMATED COUNT: 12.5 % (ref 11–15)
GLUCOSE BLD-MCNC: 126 MG/DL (ref 70–99)
HCT VFR BLD AUTO: 41.1 % (ref 39–53)
HGB BLD-MCNC: 13.8 G/DL (ref 13–17.5)
IMM GRANULOCYTES # BLD AUTO: 0.06 X10(3) UL (ref 0–1)
IMM GRANULOCYTES NFR BLD: 0.6 %
LYMPHOCYTES # BLD AUTO: 0.9 X10(3) UL (ref 1–4)
LYMPHOCYTES NFR BLD AUTO: 9.6 %
MCH RBC QN AUTO: 30.9 PG (ref 26–34)
MCHC RBC AUTO-ENTMCNC: 33.6 G/DL (ref 31–37)
MCV RBC AUTO: 92.2 FL (ref 80–100)
MONOCYTES # BLD AUTO: 1.3 X10(3) UL (ref 0.1–1)
MONOCYTES NFR BLD AUTO: 13.9 %
NEUTROPHILS # BLD AUTO: 7.05 X10 (3) UL (ref 1.5–7.7)
NEUTROPHILS # BLD AUTO: 7.05 X10(3) UL (ref 1.5–7.7)
NEUTROPHILS NFR BLD AUTO: 75.5 %
OSMOLALITY SERPL CALC.SUM OF ELEC: 287 MOSM/KG (ref 275–295)
PLATELET # BLD AUTO: 148 10(3)UL (ref 150–450)
POTASSIUM SERPL-SCNC: 3.5 MMOL/L (ref 3.5–5.1)
RBC # BLD AUTO: 4.46 X10(6)UL (ref 4.3–5.7)
SODIUM SERPL-SCNC: 138 MMOL/L (ref 136–145)
WBC # BLD AUTO: 9.4 X10(3) UL (ref 4–11)

## 2020-01-09 PROCEDURE — 97530 THERAPEUTIC ACTIVITIES: CPT

## 2020-01-09 PROCEDURE — 80048 BASIC METABOLIC PNL TOTAL CA: CPT | Performed by: INTERNAL MEDICINE

## 2020-01-09 PROCEDURE — 97150 GROUP THERAPEUTIC PROCEDURES: CPT

## 2020-01-09 PROCEDURE — 97535 SELF CARE MNGMENT TRAINING: CPT

## 2020-01-09 PROCEDURE — 97116 GAIT TRAINING THERAPY: CPT

## 2020-01-09 PROCEDURE — 85025 COMPLETE CBC W/AUTO DIFF WBC: CPT | Performed by: INTERNAL MEDICINE

## 2020-01-09 RX ORDER — HYDROCODONE BITARTRATE AND ACETAMINOPHEN 10; 325 MG/1; MG/1
1 TABLET ORAL EVERY 4 HOURS PRN
Status: DISCONTINUED | OUTPATIENT
Start: 2020-01-09 | End: 2020-01-10

## 2020-01-09 RX ORDER — POTASSIUM CHLORIDE 20 MEQ/1
40 TABLET, EXTENDED RELEASE ORAL EVERY 4 HOURS
Status: COMPLETED | OUTPATIENT
Start: 2020-01-09 | End: 2020-01-09

## 2020-01-09 RX ORDER — HYDROCODONE BITARTRATE AND ACETAMINOPHEN 10; 325 MG/1; MG/1
2 TABLET ORAL EVERY 4 HOURS PRN
Status: DISCONTINUED | OUTPATIENT
Start: 2020-01-09 | End: 2020-01-10

## 2020-01-09 RX ORDER — FUROSEMIDE 10 MG/ML
40 INJECTION INTRAMUSCULAR; INTRAVENOUS ONCE
Status: COMPLETED | OUTPATIENT
Start: 2020-01-09 | End: 2020-01-09

## 2020-01-09 NOTE — PLAN OF CARE
PT AOX4 this PM. Pain controlled via po pain meds. RLE in ace wrap, cdi, to be removed this AM. Primary issue this night is controlling the hypertension.  Have been administering IV hydralazine and IV labetalol in attempt to control bp, bp will decrease and

## 2020-01-09 NOTE — PLAN OF CARE
Pt a&O. On room air when awake. Wearing cpap at night. Encouraged use of incentive spirometer and ankle pump exercises every hour while awake. Scds to BLE. Pt having issues this entire shift with loy BPs. Medications adjusted a couple of times.  PRN BP me

## 2020-01-09 NOTE — PROGRESS NOTES
Acute Pain Service    Post Op Day 2 Ortho Note    Assessed patient in chair. Patient rates pain \"much better today. \" He states he was able to get out of bed today and doesn't have nausea anymore.     Patient states Tramadol is working well to manage pain;

## 2020-01-09 NOTE — OCCUPATIONAL THERAPY NOTE
OCCUPATIONAL THERAPY TREATMENT NOTE - INPATIENT     Room Number: 692/331-J  Session: 1  Number of Visits to Meet Established Goals: 3    Presenting Problem: (R TKA)    History related to current admission: Admitted for elective R TKA on 1/7.   PMH includes: CARTILAGE HARVEST GRAFT N/A 1/11/2011    Performed by Harish Rosales at [address] [place] [address]   • ESOPHAGOGASTRODUODENOSCOPY (EGD) N/A 3/24/2017    Performed by Junior Rivera MD at [facility] 7/2/2014    Performed by Tolerated       PAIN ASSESSMENT  Rating: 3  Location: right knee  Management Techniques:  Activity promotion     ACTIVITY TOLERANCE           BP: 156/76  BP Location: Left arm  BP Method: Automatic  Patient Position: Sitting    O2 SATURATIONS met;Call light within reach;RN aware of session/findings; All patient questions and concerns addressed; Family present; Ice applied; Discussed recommendations with /    ASSESSMENT   Patient with improved activity tolerance, active use

## 2020-01-09 NOTE — CM/SW NOTE
Spoke with Rick Jones from [facility] who stated insurance Milas Pat is still pending. / to remain available for support and/or discharge planning.      Feliciano Dasilva [facility]  Discharge Planner  [phone]

## 2020-01-09 NOTE — PLAN OF CARE
Patient verbalized pain much better today compared to yesterday. Able to participate with PT and OT this morning, assisted to use the bathroom with one assistance using a walker. Right knee with Aquacel dressing intact, small old drainage noted.   Per OT

## 2020-01-09 NOTE — CONSULTS
[facility] Cardiology Consultation    Pily Causey Patient Status:  Inpatient    1960 MRN [id number]   [facility] 3SW-A Attending Khushi Barrett MD   Hosp Day # 2 PCP Nellie Cooley MD     Reason for Consultation:  HTN      History BIOPSY, POSSIBLE POLYPECTOMY 45835 N/A 7/3/2018    Performed by Nuria Shah MD at [facility]   • DEBRIDE MASTOID CAVITY,SIMPLE     • EAR CARTILAGE HARVEST GRAFT N/A 1/11/2011    Performed by Nhung Baldwin at [address] [facility] colon   • Diabetes Mother    • Hypertension Mother    • Obesity Mother    • Psychiatric Mother         depression   • Other (Other) Brother         suicide         Allergies:    Betadine [Povidone *    ITCHING, SWELLING  Ragweed                 Runny nose Warm and dry.           Telemetry: sinus    Laboratories and Data:  Diagnostics:        Labs:   HEM:  Recent Labs   Lab 01/08/20  0509 01/09/20  0503   WBC 8.1 9.4   HGB 13.6 13.8   .0* 148.0*       Chem:  Recent Labs   Lab 01/07/20  1706 01/08/20  0

## 2020-01-09 NOTE — PROGRESS NOTES
[address]  Hospitalist Progress Note                                                                   [address] [facility]  1/5/1960    SUBJECTIVE:  Pt seen and examined. Feeling much better today. 100 mg Oral Daily   • Metoprolol Succinate ER  150 mg Oral Daily Beta Blocker   • Montelukast Sodium  10 mg Oral Daily     Continuous Infusions:     PRN: tiZANidine HCl **OR** tiZANidine HCl, oxyCODONE HCl **OR** oxyCODONE HCl **OR** oxyCODONE HCl, Labetal

## 2020-01-09 NOTE — RESPIRATORY THERAPY NOTE
JAGRUTI Equipment Usage Summary :            Set Mode :AUTO CPAP W FLEX          Usage in Hours:4;41          90% Pressure (EPAP) : 13           90% Insp Pressure (IPAP);           AHI : 20.4          Supplemental Oxygen : 2     LPM

## 2020-01-09 NOTE — PROGRESS NOTES
[facility]  Orthopedic Surgery  Progress Note    Yariel Bergman Patient Status:  Inpatient    1960 MRN [id number]   [facility] 3SW-A Attending Tyron Espinoza MD   Hosp Day # 2 PCP Edmundo Pichardo MD     SUBJECTIVE:  IN NEPRELIM 7.05   WBC 9.4   .0*      No results for input(s): PTP, INR in the last 168 hours. ASSESSMENT/PLAN:  1. Continue pain management  2. Cardiology consulted  3. Continue DVT prophylaxis   4. Continue PT/OT  5.  Junior consulted and ap

## 2020-01-09 NOTE — PHYSICAL THERAPY NOTE
PHYSICAL THERAPY KNEE TREATMENT NOTE - INPATIENT     Room Number: 767/526-P     Session: 1   Number of Visits to Meet Established Goals: 6    Presenting Problem: (s/p right TKR )    History related to current admission: Pt admit from home with history of 1/11/2011    Performed by Neli Barahona at [facility]   • ESOPHAGOGASTRODUODENOSCOPY (EGD) N/A 3/24/2017    Performed by Cat Peres MD at [address] [place] [address] MASS Right 7/2/2014    Performed by Yayo Banegas MD at [facility] MORENO None        R Lower Extremity: Weight Bearing as Tolerated       PAIN ASSESSMENT   Rating: Unable to rate  Location: L knee  Management Techniques: Activity promotion; Body mechanics;Breathing techniques;Repositioning    BALANCE  Static Sitting: Fair  Dynam Pt performs heel raises c RW and min A.   BP monitored in sitting 178/101 RN made aware. After seated rest, pt Stand >sit min a x 1 x 2 for RW adjustment . Pt performed seated therex per TKA protocol. Pt required AAROM SLR.    Pt educated on safe mobilit mechanics; Don/doff brace; Endurance; Energy conservation;Patient education; Family education;Gait training;Neuromuscular re-educate;Range of motion;Strengthening;Stair training;Transfer training;Balance training  Rehab Potential : Good  Frequency (Obs): BID

## 2020-01-10 VITALS
HEART RATE: 91 BPM | DIASTOLIC BLOOD PRESSURE: 79 MMHG | TEMPERATURE: 98 F | SYSTOLIC BLOOD PRESSURE: 149 MMHG | BODY MASS INDEX: 44.72 KG/M2 | RESPIRATION RATE: 18 BRPM | OXYGEN SATURATION: 99 % | HEIGHT: 70 IN | WEIGHT: 312.38 LBS

## 2020-01-10 LAB
ANION GAP SERPL CALC-SCNC: 6 MMOL/L (ref 0–18)
BUN BLD-MCNC: 18 MG/DL (ref 7–18)
BUN/CREAT SERPL: 16.1 (ref 10–20)
CALCIUM BLD-MCNC: 9 MG/DL (ref 8.5–10.1)
CHLORIDE SERPL-SCNC: 104 MMOL/L (ref 98–112)
CO2 SERPL-SCNC: 28 MMOL/L (ref 21–32)
CREAT BLD-MCNC: 1.12 MG/DL (ref 0.7–1.3)
GLUCOSE BLD-MCNC: 118 MG/DL (ref 70–99)
OSMOLALITY SERPL CALC.SUM OF ELEC: 289 MOSM/KG (ref 275–295)
POTASSIUM SERPL-SCNC: 3.7 MMOL/L (ref 3.5–5.1)
SODIUM SERPL-SCNC: 138 MMOL/L (ref 136–145)

## 2020-01-10 PROCEDURE — 97116 GAIT TRAINING THERAPY: CPT

## 2020-01-10 PROCEDURE — 97150 GROUP THERAPEUTIC PROCEDURES: CPT

## 2020-01-10 PROCEDURE — 80048 BASIC METABOLIC PNL TOTAL CA: CPT | Performed by: INTERNAL MEDICINE

## 2020-01-10 RX ORDER — CLONIDINE HYDROCHLORIDE 0.2 MG/1
0.2 TABLET ORAL 2 TIMES DAILY
Qty: 60 TABLET | Refills: 11 | Status: SHIPPED | OUTPATIENT
Start: 2020-01-10 | End: 2020-02-05

## 2020-01-10 NOTE — OCCUPATIONAL THERAPY NOTE
Attempted to see patient for OT services, however patient just receiving meal, reporting also expecting to discharge to rehab within the hour. Will reattempt as able if patient does not discharge today as expected.

## 2020-01-10 NOTE — PLAN OF CARE
Pt ok to dc to acute rehab per im md, cards apn, ortho apn. Pt on ra , scds in place. Report given to rn at rehab. Pt given copy of dc instructions, pt & family watched dc video. medicar given envelope of information for rn at  Rehab.  Pain controlled on

## 2020-01-10 NOTE — PROGRESS NOTES
[facility] Hospitalist Progress Note                                                                   [address] [facility]  1/5/1960    SUBJECTIVE:  Pt seen and examined.   Doing well, no new complaint 10 mg Oral Daily     Continuous Infusions:     PRN: HYDROcodone-acetaminophen **OR** HYDROcodone-acetaminophen, tiZANidine HCl **OR** tiZANidine HCl, Labetalol HCl, hydrALAzine HCl, acetaminophen, PEG 3350, magnesium hydroxide, bisacodyl, FLEET ENEMA, onda

## 2020-01-10 NOTE — PAYOR COMM NOTE
--------------  CONTINUED STAY REVIEW    Payor: Janay Rosa  #:  [id number]  Authorization Number: [id number]    Admit date: 1/7/20  Admit time: 1632    Admitting Physician: Antoinette George MD  Attending Physician:  Antoinette George, Oral Ricardo Corea RN      allopurinol (ZYLOPRIM) tab 200 mg     Date Action Dose Route User    1/10/2020 1225 Given 200 mg Oral Derek James RN      apixaban Danielle Manifold) tab 2.5 mg     Date Action Dose Route User    1/10/2020 3750 Given 2.5 mg 10 mg     Date Action Dose Route User    1/9/2020 1230 Given 10 mg Oral Afia Myrick RN      Potassium Chloride ER (K-DUR M20) CR tab 40 mEq     Date Action Dose Route User    1/9/2020 1417 Given 40 mEq Oral Riky Carrillo, RN      traMADol [facility].

## 2020-01-10 NOTE — PROGRESS NOTES
[facility] Cardiology Progress Note        Rhonda Linda Patient Status:  Inpatient    1960 MRN [id number]   [facility] 3SW-A Attending Kalyani Wang MD   Hosp Day # 3 PCP MD Adán Lopez No S3, S4, rub, click. No murmur. Lungs: Clear to auscultation and percussion. Abdomen: Soft, non-tender. Extremities: No LE edema. No clubbing or cyanosis. Neurologic: Alert and oriented, normal affect. Skin: Warm and dry.            LABS:      H

## 2020-01-10 NOTE — PLAN OF CARE
Pt has been AOX4 all t/o the shift. Denies any numbness on BLE. Aquacel dressing is C/D/I, ice gel maintained. Up with min assist using the walker + gait belt. Ankle pumps, IS use highly encouraged. Tolerated CPM last night. Voids per urinal, last BM 1/6.

## 2020-01-10 NOTE — DISCHARGE SUMMARY
Patient ID:  Isaac Pablo  [id number]  39 year old  1/5/1960    Admit Date: 1/7/2020    Discharge Date and Time: 1/10/2020     Attending Physician: Aleshia Carrera MD    Reason for admission: Primary osteoarthritis of right knee [M17.11]  Primary oste for pain. Ok to refill T+7 days  Qty: 60 tablet Refills: 0    docusate sodium (COLACE) 100 MG Oral Cap  Take 1 capsule (100 mg total) by mouth 2 (two) times daily.   Qty: 60 capsule Refills: 0    Ferrous Sulfate 325 (65 Fe) MG Oral Tab  Take 1 tablet (325

## 2020-01-10 NOTE — RESPIRATORY THERAPY NOTE
JAGRUTI Equipment Usage Summary :            Set Mode : CPAP W FLEX          Usage in Hours:9;30          90% Pressure (EPAP) : 8           90% Insp Pressure (IPAP);           AHI : 0.3          Supplemental Oxygen :   2   LPM

## 2020-01-10 NOTE — PHYSICAL THERAPY NOTE
PHYSICAL THERAPY KNEE TREATMENT NOTE - INPATIENT     Room Number: 306/947-Q     Session: 2  Number of Visits to Meet Established Goals: 6    Presenting Problem: (s/p right TKR )    History related to current admission: Pt admit from home with history of O 1/11/2011    Performed by Jermaine Roberts at [facility]   • ESOPHAGOGASTRODUODENOSCOPY (EGD) N/A 3/24/2017    Performed by Kylee Gerardo MD at [address] [place] [address] MASS Right 7/2/2014    Performed by Micah Cotto MD at [facility] MORENO BEARING STATUS  Weight Bearing Restriction: None        R Lower Extremity: Weight Bearing as Tolerated       PAIN ASSESSMENT   Ratin  Location: R knee  Management Techniques: Activity promotion; Body mechanics;Breathing techniques    BALANCE  Static Sit control, pt reports fatigue. Pt wheeled back to room by PT aide.           Exercises AM Session PM Session   Ankle Pumps 15 reps 0 reps   Quad Sets 15 reps 0 reps   Glut Sets 15 reps 0 reps   Hip Abd/Add 15 reps 0 reps   Heel slides 15 reps 0 reps   Saq 15 to/from Stand at assistance level: supervision    Goal #3     Patient is able to ambulate 300 feet with assistive device at assistance level: modified  independent    Goal #4     Patient will negotiate 4 stairs/one curb w/ assistive device and supervision

## 2020-01-10 NOTE — CM/SW NOTE
Spoke with Gasper Koch from [facility] who stated they have received insurance auth and can accept pt for admission today. Pt can go to room 3361, RN to call report to [phone]. Met with pt who expressed agreement with DC plan for [facility] acute rehab today.   Dis

## 2020-01-11 NOTE — CM/SW NOTE
01/10/20 1300   Discharge disposition   Expected discharge disposition Rehab [address]   Name of Facillity/Home Care/Hospice [facility]   Discharge transportation [facility] 1/10/2020

## 2020-01-13 NOTE — PAYOR COMM NOTE
REQUESTING 1 ADDITIONAL WAY. TOTAL 3 DAYS.     DISCHARGE REVIEW    Payor: Janay Amaya #:  [id number]  Authorization Number: [id number]    Admit date: 1/7/20  Admit time:  1632  Discharge Date: 1/10/2020  4:19 PM     Admitting Physician mouth daily. Cholecalciferol (VITAMIN D3 OR)  Take 1 tablet by mouth daily. tiZANidine HCl 4 MG Oral Tab  Take 1 tablet (4 mg total) by mouth every 8 (eight) hours as needed (muscle pain/spasm).   Qty: 15 tablet Refills: 0    losartan 100 MG Oral Ta necessary supplies to include tubing, cup and mask. .90 ASTHMA  Qty: 1 each Refills: 0    Colchicine 0.6 MG Oral Tab  1 tablet at start of gout attack . May repeat in 4-6 hours.  No greater than 2 pills daily  Qty: 30 Tab Refills: 4  [facility]

## 2020-01-29 PROBLEM — Z96.651 S/P TOTAL KNEE ARTHROPLASTY, RIGHT: Status: ACTIVE | Noted: 2020-01-10

## 2020-01-29 PROBLEM — D62 ANEMIA DUE TO ACUTE BLOOD LOSS: Status: ACTIVE | Noted: 2020-01-11

## 2020-01-29 PROBLEM — M54.50 ACUTE LEFT-SIDED LOW BACK PAIN WITHOUT SCIATICA: Status: RESOLVED | Noted: 2018-03-15 | Resolved: 2020-01-29

## 2020-02-12 PROBLEM — R00.1 SINUS BRADYCARDIA: Status: ACTIVE | Noted: 2020-02-12

## 2020-02-12 PROBLEM — I49.3 FREQUENT PVCS: Status: ACTIVE | Noted: 2020-02-12

## 2020-11-21 PROCEDURE — 88305 TISSUE EXAM BY PATHOLOGIST: CPT | Performed by: UROLOGY

## 2020-12-30 RX ORDER — DIPHENHYDRAMINE HCL 25 MG
25 CAPSULE ORAL AS NEEDED
COMMUNITY

## 2020-12-30 RX ORDER — ACETAMINOPHEN 500 MG
1000 TABLET ORAL ONCE
Status: CANCELLED | OUTPATIENT
Start: 2020-12-30 | End: 2020-12-30

## 2021-01-06 ENCOUNTER — LAB ENCOUNTER (OUTPATIENT)
Dept: LAB | Facility: HOSPITAL | Age: 61
End: 2021-01-06
Attending: UROLOGY
Payer: COMMERCIAL

## 2021-01-06 DIAGNOSIS — N40.0 BENIGN PROSTATIC HYPERPLASIA, UNSPECIFIED WHETHER LOWER URINARY TRACT SYMPTOMS PRESENT: ICD-10-CM

## 2021-01-07 LAB — SARS-COV-2 RNA RESP QL NAA+PROBE: NOT DETECTED

## 2021-01-08 ENCOUNTER — HOSPITAL ENCOUNTER (OUTPATIENT)
Facility: HOSPITAL | Age: 61
Setting detail: HOSPITAL OUTPATIENT SURGERY
Discharge: HOME | End: 2021-01-08
Attending: UROLOGY | Admitting: UROLOGY
Payer: COMMERCIAL

## 2021-01-08 ENCOUNTER — ANESTHESIA (OUTPATIENT)
Dept: PREOP | Facility: HOSPITAL | Age: 61
End: 2021-01-08

## 2021-01-08 ENCOUNTER — ANESTHESIA EVENT (OUTPATIENT)
Dept: PREOP | Facility: HOSPITAL | Age: 61
End: 2021-01-08

## 2021-01-08 VITALS
RESPIRATION RATE: 18 BRPM | TEMPERATURE: 98 F | WEIGHT: 315 LBS | HEART RATE: 78 BPM | SYSTOLIC BLOOD PRESSURE: 165 MMHG | HEIGHT: 71 IN | OXYGEN SATURATION: 98 % | DIASTOLIC BLOOD PRESSURE: 101 MMHG | BODY MASS INDEX: 44.1 KG/M2

## 2021-01-08 DIAGNOSIS — N40.1 BENIGN PROSTATIC HYPERPLASIA WITH LOWER URINARY TRACT SYMPTOMS, SYMPTOM DETAILS UNSPECIFIED: ICD-10-CM

## 2021-01-08 DIAGNOSIS — N40.0 BENIGN PROSTATIC HYPERPLASIA, UNSPECIFIED WHETHER LOWER URINARY TRACT SYMPTOMS PRESENT: Primary | ICD-10-CM

## 2021-01-08 RX ORDER — ACETAMINOPHEN 500 MG
1000 TABLET ORAL ONCE
Status: ON HOLD | COMMUNITY
End: 2021-01-23

## 2021-01-08 RX ORDER — SODIUM CHLORIDE, SODIUM LACTATE, POTASSIUM CHLORIDE, CALCIUM CHLORIDE 600; 310; 30; 20 MG/100ML; MG/100ML; MG/100ML; MG/100ML
INJECTION, SOLUTION INTRAVENOUS CONTINUOUS
Status: DISCONTINUED | OUTPATIENT
Start: 2021-01-08 | End: 2021-01-08

## 2021-01-08 NOTE — ANESTHESIA PREPROCEDURE EVALUATION
PRE-OP EVALUATION    Patient Name: Merline Peach    Pre-op Diagnosis: Benign prostatic hyperplasia with lower urinary tract symptoms, symptom details unspecified [N40.1]    Procedure(s):  CYSTOSCOPY TRANSURETHRAL RESECTION PROSTATE    Surgeon(s) and Rol after return home from total knee replacement surgery for 1 month., Disp: 30 tablet, Rfl: 0    •  Glucosamine HCl (GLUCOSAMINE OR), Take 2 tablets by mouth daily. , Disp: , Rfl:     •  Cholecalciferol (VITAMIN D3 OR), Take 1 tablet by mouth daily.   , Disp: 4        Allergies: Betadine [Povidone Iodine]; Ragweed; Seasonal; and Tree, Elm      Anesthesia Evaluation    Patient summary reviewed.     Anesthetic Complications  (+) history of anesthetic complications         GI/Hepatic/Renal      (+) GERD arthroplasty, right     Anemia due to acute blood loss     Frequent PVCs     Sinus bradycardia          Past Surgical History:   Procedure Laterality Date   • BILATERAL  NASAL  GRAFTS Bilateral 1/11/2011    Performed by Oziel Dunaway at [facility] SEPTOPLASTY NASAL N/A 1/11/2011    Performed by Nestor Dye at [facility] El Left 6/18/2019    Performed by Juanna Leyden, MD at [address] [place] [address]   • SHOULDER TOTAL Right 12/18/2018    Performed by Juanna Leyden, MD at [facility] consent form.       Plan/risks discussed with: patient                Present on Admission:  **None**

## 2021-01-08 NOTE — H&P
Amy Shah MD  Internal Medicine  Obstructive sleep apnea (adult) (pediatric) +2 more  Dx  Pre-Op Exam    Reason for Visit          Reason for Visit    Pre-Op Exam Medical clearance - CYSTOSCOPY TRANSURETHRAL RESECTION PROSTATE   Reason for Visit •  allopurinol 100 MG Oral Tab, Take 2 tablets (200 mg total) by mouth daily. , Disp: 180 tablet, Rfl: 3  •  Sulfamethoxazole-TMP -160 MG Oral Tab per tablet, Take 1 tablet twice daily starting the day prior to biopsy, Disp: 6 tablet, Rfl: 0  •  Metop •  Colchicine 0.6 MG Oral Tab, 1 tablet at start of gout attack . May repeat in 4-6 hours.  No greater than 2 pills daily (Patient taking differently: Take 0.6 mg by mouth as needed.  ), Disp: 30 Tab, Rfl: 4  •  Montelukast Sodium 10 MG Oral Tab, Take 1 tab  - 1981. Wife has ureteral stenosis and bicuspid valve and is s/p valve replacement and scheduled for elective ureteral repair with dr Hoover Graft- November 13. 1s- 25 [employer] employee.  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  Normal mood           Component      Latest Ref Rng & Units 12/31/2020 12/11/2020 12/11/2020            10:11 AM 10:11 AM   WBC      4.00 - 13.00 10:3/uL     5.99   RBC      4.30 - 5.70 10:6/uL     5 Yellow Yellow       APPEARANCE      Clear Clear       WBC Urine      Negative 1+ (A)       Protein      Negative/Trace 2+ (A)       Glucose Urine      Negative Negative       Ketones, UA      Negative Negative       Blood Urine      Negative Negative   -EKG normal sinus rhythm               -JAGRUTI precautions               -hold ASA/NSAIDS               -continue blood pressure regimen.                -he may proceed with surgery                -copy to PAT/ Dr Danisha Abernathy  Patient instr

## 2021-01-08 NOTE — H&P (VIEW-ONLY)
Carito Moses MD  Internal Medicine  Obstructive sleep apnea (adult) (pediatric) +2 more  Dx  Pre-Op Exam    Reason for Visit          Reason for Visit    Pre-Op Exam Medical clearance - CYSTOSCOPY TRANSURETHRAL RESECTION PROSTATE   Reason for Visit •  allopurinol 100 MG Oral Tab, Take 2 tablets (200 mg total) by mouth daily. , Disp: 180 tablet, Rfl: 3  •  Sulfamethoxazole-TMP -160 MG Oral Tab per tablet, Take 1 tablet twice daily starting the day prior to biopsy, Disp: 6 tablet, Rfl: 0  •  Metop •  Colchicine 0.6 MG Oral Tab, 1 tablet at start of gout attack . May repeat in 4-6 hours.  No greater than 2 pills daily (Patient taking differently: Take 0.6 mg by mouth as needed.  ), Disp: 30 Tab, Rfl: 4  •  Montelukast Sodium 10 MG Oral Tab, Take 1 tab  - 1981. Wife has ureteral stenosis and bicuspid valve and is s/p valve replacement and scheduled for elective ureteral repair with dr Frank Toscano- November 13. 1s- 25 [employer] employee.  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  Normal mood           Component      Latest Ref Rng & Units 12/31/2020 12/11/2020 12/11/2020            10:11 AM 10:11 AM   WBC      4.00 - 13.00 10:3/uL     5.99   RBC      4.30 - 5.70 10:6/uL     5 Yellow Yellow       APPEARANCE      Clear Clear       WBC Urine      Negative 1+ (A)       Protein      Negative/Trace 2+ (A)       Glucose Urine      Negative Negative       Ketones, UA      Negative Negative       Blood Urine      Negative Negative   -EKG normal sinus rhythm               -JAGRUTI precautions               -hold ASA/NSAIDS               -continue blood pressure regimen.                -he may proceed with surgery                -copy to PAT/ Dr Mathew Cassette  Patient instr

## 2021-01-15 RX ORDER — CEFAZOLIN SODIUM/WATER 2 G/20 ML
2 SYRINGE (ML) INTRAVENOUS ONCE
Status: CANCELLED | OUTPATIENT
Start: 2021-01-15 | End: 2021-01-15

## 2021-01-20 ENCOUNTER — LAB ENCOUNTER (OUTPATIENT)
Dept: LAB | Facility: HOSPITAL | Age: 61
End: 2021-01-20
Attending: UROLOGY
Payer: COMMERCIAL

## 2021-01-20 DIAGNOSIS — N40.1 BENIGN PROSTATIC HYPERPLASIA WITH LOWER URINARY TRACT SYMPTOMS: ICD-10-CM

## 2021-01-20 LAB — SARS-COV-2 RNA RESP QL NAA+PROBE: NOT DETECTED

## 2021-01-22 ENCOUNTER — ANESTHESIA (OUTPATIENT)
Dept: SURGERY | Facility: HOSPITAL | Age: 61
End: 2021-01-22
Payer: COMMERCIAL

## 2021-01-22 ENCOUNTER — ANESTHESIA EVENT (OUTPATIENT)
Dept: SURGERY | Facility: HOSPITAL | Age: 61
End: 2021-01-22
Payer: COMMERCIAL

## 2021-01-22 ENCOUNTER — HOSPITAL ENCOUNTER (OUTPATIENT)
Facility: HOSPITAL | Age: 61
Discharge: HOME | End: 2021-01-23
Attending: UROLOGY | Admitting: UROLOGY
Payer: COMMERCIAL

## 2021-01-22 DIAGNOSIS — N40.1 BENIGN PROSTATIC HYPERPLASIA WITH LOWER URINARY TRACT SYMPTOMS, SYMPTOM DETAILS UNSPECIFIED: ICD-10-CM

## 2021-01-22 DIAGNOSIS — N40.1 BENIGN PROSTATIC HYPERPLASIA WITH LOWER URINARY TRACT SYMPTOMS: Primary | ICD-10-CM

## 2021-01-22 LAB — GLUCOSE BLD-MCNC: 117 MG/DL (ref 70–99)

## 2021-01-22 PROCEDURE — 82962 GLUCOSE BLOOD TEST: CPT

## 2021-01-22 PROCEDURE — 88305 TISSUE EXAM BY PATHOLOGIST: CPT | Performed by: UROLOGY

## 2021-01-22 PROCEDURE — 0VT08ZZ RESECTION OF PROSTATE, VIA NATURAL OR ARTIFICIAL OPENING ENDOSCOPIC: ICD-10-PCS | Performed by: UROLOGY

## 2021-01-22 PROCEDURE — 94660 CPAP INITIATION&MGMT: CPT

## 2021-01-22 RX ORDER — CLONIDINE HYDROCHLORIDE 0.2 MG/1
0.2 TABLET ORAL 2 TIMES DAILY
Status: DISCONTINUED | OUTPATIENT
Start: 2021-01-22 | End: 2021-01-22

## 2021-01-22 RX ORDER — SODIUM CHLORIDE, SODIUM LACTATE, POTASSIUM CHLORIDE, CALCIUM CHLORIDE 600; 310; 30; 20 MG/100ML; MG/100ML; MG/100ML; MG/100ML
INJECTION, SOLUTION INTRAVENOUS CONTINUOUS
Status: DISCONTINUED | OUTPATIENT
Start: 2021-01-22 | End: 2021-01-23

## 2021-01-22 RX ORDER — ALBUTEROL SULFATE 90 UG/1
2 AEROSOL, METERED RESPIRATORY (INHALATION) EVERY 4 HOURS PRN
Status: DISCONTINUED | OUTPATIENT
Start: 2021-01-22 | End: 2021-01-23

## 2021-01-22 RX ORDER — METOCLOPRAMIDE HYDROCHLORIDE 5 MG/ML
INJECTION INTRAMUSCULAR; INTRAVENOUS AS NEEDED
Status: DISCONTINUED | OUTPATIENT
Start: 2021-01-22 | End: 2021-01-22 | Stop reason: SURG

## 2021-01-22 RX ORDER — MEPERIDINE HYDROCHLORIDE 25 MG/ML
12.5 INJECTION INTRAMUSCULAR; INTRAVENOUS; SUBCUTANEOUS AS NEEDED
Status: DISCONTINUED | OUTPATIENT
Start: 2021-01-22 | End: 2021-01-22 | Stop reason: HOSPADM

## 2021-01-22 RX ORDER — HYDROCHLOROTHIAZIDE 25 MG/1
12.5 TABLET ORAL DAILY
Status: DISCONTINUED | OUTPATIENT
Start: 2021-01-23 | End: 2021-01-22

## 2021-01-22 RX ORDER — METOPROLOL TARTRATE 5 MG/5ML
2.5 INJECTION INTRAVENOUS ONCE
Status: DISCONTINUED | OUTPATIENT
Start: 2021-01-22 | End: 2021-01-22 | Stop reason: HOSPADM

## 2021-01-22 RX ORDER — DIPHENHYDRAMINE HYDROCHLORIDE 50 MG/ML
12.5 INJECTION INTRAMUSCULAR; INTRAVENOUS AS NEEDED
Status: DISCONTINUED | OUTPATIENT
Start: 2021-01-22 | End: 2021-01-22 | Stop reason: HOSPADM

## 2021-01-22 RX ORDER — MAGNESIUM HYDROXIDE 1200 MG/15ML
3000 LIQUID ORAL CONTINUOUS
Status: DISCONTINUED | OUTPATIENT
Start: 2021-01-22 | End: 2021-01-23

## 2021-01-22 RX ORDER — ACETAMINOPHEN 500 MG
1000 TABLET ORAL ONCE AS NEEDED
Status: DISCONTINUED | OUTPATIENT
Start: 2021-01-22 | End: 2021-01-22 | Stop reason: HOSPADM

## 2021-01-22 RX ORDER — AZELAIC ACID 0.15 G/G
1 GEL TOPICAL DAILY
Status: DISCONTINUED | OUTPATIENT
Start: 2021-01-22 | End: 2021-01-22

## 2021-01-22 RX ORDER — LOSARTAN POTASSIUM 100 MG/1
100 TABLET ORAL DAILY
Status: DISCONTINUED | OUTPATIENT
Start: 2021-01-23 | End: 2021-01-22

## 2021-01-22 RX ORDER — DIPHENHYDRAMINE HCL 25 MG
25 CAPSULE ORAL AS NEEDED
Status: DISCONTINUED | OUTPATIENT
Start: 2021-01-22 | End: 2021-01-22

## 2021-01-22 RX ORDER — MIDAZOLAM HYDROCHLORIDE 1 MG/ML
INJECTION INTRAMUSCULAR; INTRAVENOUS AS NEEDED
Status: DISCONTINUED | OUTPATIENT
Start: 2021-01-22 | End: 2021-01-22 | Stop reason: SURG

## 2021-01-22 RX ORDER — MV,CALCIUM,MIN/IRON/FOLIC/VITK 9-200-40
2 TABLET,CHEWABLE ORAL DAILY
Status: DISCONTINUED | OUTPATIENT
Start: 2021-01-22 | End: 2021-01-22

## 2021-01-22 RX ORDER — NALOXONE HYDROCHLORIDE 0.4 MG/ML
80 INJECTION, SOLUTION INTRAMUSCULAR; INTRAVENOUS; SUBCUTANEOUS AS NEEDED
Status: DISCONTINUED | OUTPATIENT
Start: 2021-01-22 | End: 2021-01-22 | Stop reason: HOSPADM

## 2021-01-22 RX ORDER — TRAMADOL HYDROCHLORIDE 50 MG/1
50 TABLET ORAL 2 TIMES DAILY PRN
Status: DISCONTINUED | OUTPATIENT
Start: 2021-01-22 | End: 2021-01-23

## 2021-01-22 RX ORDER — ROCURONIUM BROMIDE 10 MG/ML
INJECTION, SOLUTION INTRAVENOUS AS NEEDED
Status: DISCONTINUED | OUTPATIENT
Start: 2021-01-22 | End: 2021-01-22 | Stop reason: SURG

## 2021-01-22 RX ORDER — DEXAMETHASONE SODIUM PHOSPHATE 4 MG/ML
VIAL (ML) INJECTION AS NEEDED
Status: DISCONTINUED | OUTPATIENT
Start: 2021-01-22 | End: 2021-01-22 | Stop reason: SURG

## 2021-01-22 RX ORDER — ALBUTEROL SULFATE 2.5 MG/3ML
2.5 SOLUTION RESPIRATORY (INHALATION) EVERY 4 HOURS PRN
Status: DISCONTINUED | OUTPATIENT
Start: 2021-01-22 | End: 2021-01-23

## 2021-01-22 RX ORDER — SULFAMETHOXAZOLE AND TRIMETHOPRIM 800; 160 MG/1; MG/1
1 TABLET ORAL 2 TIMES DAILY
Status: DISCONTINUED | OUTPATIENT
Start: 2021-01-22 | End: 2021-01-22

## 2021-01-22 RX ORDER — ALLOPURINOL 100 MG/1
200 TABLET ORAL DAILY
Status: DISCONTINUED | OUTPATIENT
Start: 2021-01-23 | End: 2021-01-23

## 2021-01-22 RX ORDER — HYDROCODONE BITARTRATE AND ACETAMINOPHEN 5; 325 MG/1; MG/1
2 TABLET ORAL AS NEEDED
Status: DISCONTINUED | OUTPATIENT
Start: 2021-01-22 | End: 2021-01-22 | Stop reason: HOSPADM

## 2021-01-22 RX ORDER — MONTELUKAST SODIUM 10 MG/1
10 TABLET ORAL DAILY
Status: DISCONTINUED | OUTPATIENT
Start: 2021-01-23 | End: 2021-01-23

## 2021-01-22 RX ORDER — LIDOCAINE HYDROCHLORIDE 10 MG/ML
INJECTION, SOLUTION EPIDURAL; INFILTRATION; INTRACAUDAL; PERINEURAL AS NEEDED
Status: DISCONTINUED | OUTPATIENT
Start: 2021-01-22 | End: 2021-01-22 | Stop reason: SURG

## 2021-01-22 RX ORDER — ONDANSETRON 2 MG/ML
4 INJECTION INTRAMUSCULAR; INTRAVENOUS AS NEEDED
Status: DISCONTINUED | OUTPATIENT
Start: 2021-01-22 | End: 2021-01-22 | Stop reason: HOSPADM

## 2021-01-22 RX ORDER — TIZANIDINE 4 MG/1
4 TABLET ORAL EVERY 8 HOURS PRN
Status: DISCONTINUED | OUTPATIENT
Start: 2021-01-22 | End: 2021-01-23

## 2021-01-22 RX ORDER — ERGOCALCIFEROL (VITAMIN D2) 10 MCG
400 TABLET ORAL DAILY
Status: DISCONTINUED | OUTPATIENT
Start: 2021-01-22 | End: 2021-01-22

## 2021-01-22 RX ORDER — LABETALOL HYDROCHLORIDE 5 MG/ML
5 INJECTION, SOLUTION INTRAVENOUS EVERY 5 MIN PRN
Status: DISCONTINUED | OUTPATIENT
Start: 2021-01-22 | End: 2021-01-22 | Stop reason: HOSPADM

## 2021-01-22 RX ORDER — ACETAMINOPHEN 500 MG
1000 TABLET ORAL ONCE
Status: DISCONTINUED | OUTPATIENT
Start: 2021-01-22 | End: 2021-01-22

## 2021-01-22 RX ORDER — HYDROMORPHONE HYDROCHLORIDE 1 MG/ML
0.4 INJECTION, SOLUTION INTRAMUSCULAR; INTRAVENOUS; SUBCUTANEOUS EVERY 5 MIN PRN
Status: DISCONTINUED | OUTPATIENT
Start: 2021-01-22 | End: 2021-01-22 | Stop reason: HOSPADM

## 2021-01-22 RX ORDER — ONDANSETRON 2 MG/ML
INJECTION INTRAMUSCULAR; INTRAVENOUS AS NEEDED
Status: DISCONTINUED | OUTPATIENT
Start: 2021-01-22 | End: 2021-01-22 | Stop reason: SURG

## 2021-01-22 RX ORDER — HYDROCODONE BITARTRATE AND ACETAMINOPHEN 5; 325 MG/1; MG/1
1 TABLET ORAL AS NEEDED
Status: DISCONTINUED | OUTPATIENT
Start: 2021-01-22 | End: 2021-01-22 | Stop reason: HOSPADM

## 2021-01-22 RX ORDER — SODIUM CHLORIDE, SODIUM LACTATE, POTASSIUM CHLORIDE, CALCIUM CHLORIDE 600; 310; 30; 20 MG/100ML; MG/100ML; MG/100ML; MG/100ML
INJECTION, SOLUTION INTRAVENOUS CONTINUOUS
Status: DISCONTINUED | OUTPATIENT
Start: 2021-01-22 | End: 2021-01-22 | Stop reason: HOSPADM

## 2021-01-22 RX ORDER — TAMSULOSIN HYDROCHLORIDE 0.4 MG/1
0.8 CAPSULE ORAL DAILY
Status: DISCONTINUED | OUTPATIENT
Start: 2021-01-23 | End: 2021-01-23

## 2021-01-22 RX ORDER — ACETAMINOPHEN 500 MG
1000 TABLET ORAL ONCE
Status: DISCONTINUED | OUTPATIENT
Start: 2021-01-22 | End: 2021-01-23

## 2021-01-22 RX ORDER — CLONIDINE HYDROCHLORIDE 0.2 MG/1
0.2 TABLET ORAL 2 TIMES DAILY
Status: DISCONTINUED | OUTPATIENT
Start: 2021-01-22 | End: 2021-01-23

## 2021-01-22 RX ADMIN — METOCLOPRAMIDE HYDROCHLORIDE 10 MG: 5 INJECTION INTRAMUSCULAR; INTRAVENOUS at 10:35:00

## 2021-01-22 RX ADMIN — DEXAMETHASONE SODIUM PHOSPHATE 4 MG: 4 MG/ML VIAL (ML) INJECTION at 10:35:00

## 2021-01-22 RX ADMIN — SODIUM CHLORIDE, SODIUM LACTATE, POTASSIUM CHLORIDE, CALCIUM CHLORIDE: 600; 310; 30; 20 INJECTION, SOLUTION INTRAVENOUS at 11:51:00

## 2021-01-22 RX ADMIN — ROCURONIUM BROMIDE 20 MG: 10 INJECTION, SOLUTION INTRAVENOUS at 11:17:00

## 2021-01-22 RX ADMIN — LIDOCAINE HYDROCHLORIDE 50 MG: 10 INJECTION, SOLUTION EPIDURAL; INFILTRATION; INTRACAUDAL; PERINEURAL at 10:13:00

## 2021-01-22 RX ADMIN — ROCURONIUM BROMIDE 30 MG: 10 INJECTION, SOLUTION INTRAVENOUS at 10:28:00

## 2021-01-22 RX ADMIN — MIDAZOLAM HYDROCHLORIDE 2 MG: 1 INJECTION INTRAMUSCULAR; INTRAVENOUS at 10:13:00

## 2021-01-22 RX ADMIN — LIDOCAINE HYDROCHLORIDE 50 MG: 10 INJECTION, SOLUTION EPIDURAL; INFILTRATION; INTRACAUDAL; PERINEURAL at 10:28:00

## 2021-01-22 RX ADMIN — LIDOCAINE HYDROCHLORIDE 50 MG: 10 INJECTION, SOLUTION EPIDURAL; INFILTRATION; INTRACAUDAL; PERINEURAL at 11:41:00

## 2021-01-22 RX ADMIN — ONDANSETRON 4 MG: 2 INJECTION INTRAMUSCULAR; INTRAVENOUS at 10:35:00

## 2021-01-22 RX ADMIN — ROCURONIUM BROMIDE 50 MG: 10 INJECTION, SOLUTION INTRAVENOUS at 10:21:00

## 2021-01-22 NOTE — CONSULTS
[facility] Hospitalist Initial Consult       Reason for Consult: Medical Management sp TURP      History of Present Illness: Patient is a 59year old male with PMH sig for HTN, JAGRUTI, GERD, BPH who presents sp the above procedure.  He tolerated the procedure well wi albuterol sulfate, Albuterol Sulfate HFA, tiZANidine HCl, traMADol HCl    Allergies:    Betadine [Povidone *    ITCHING, SWELLING  Ragweed                 Runny nose    Comment:  Seasonal                Runny nose  Tree, Elm               Runny nose    Com OTHER Left     shoulder   • OTHER SURGICAL HISTORY  10/12/2012    Prostate Biopsy - Dr. William Benoit    • Aasa 43 Bilateral     Right reverse shoulder arthroplasty    • SEPTOPLASTY NASAL N/A 1/11/2011    Performed by Carin Collado at [address] [place] [address] input(s): LYM#, MONO#, BASOS#, EOSIN#    No results for input(s): NA, K, CL, CO2, BUN, CREATSERUM, CA, CAION, MG, PHOS, GLU in the last 168 hours. No results for input(s): ALT, AST, ALB, AMYLASE, LIPASE, LDH in the last 168 hours.     Invalid input(s): A

## 2021-01-22 NOTE — ANESTHESIA PREPROCEDURE EVALUATION
PRE-OP EVALUATION    Patient Name: Lakesha Freeman    Pre-op Diagnosis: Benign prostatic hyperplasia with lower urinary tract symptoms, symptom details unspecified [N40.1]    Procedure(s):  CYSTOSCOPY TRANSURETHRAL RESECTION PROSTATE    Surgeon(s) and Rol needed for Pain., Disp: 45 tablet, Rfl: 0, Past Week at Unknown time    •  LOSARTAN 100 MG Oral Tab, TAKE 1 TABLET BY MOUTH EVERY DAY, Disp: 90 tablet, Rfl: 3, 1/22/2021 at 0640    •  Albuterol Sulfate HFA (PROAIR HFA) 108 (90 Base) MCG/ACT Inhalation Aero all necessary supplies to include tubing, cup and mask. .90 ASTHMA, Disp: 1 each, Rfl: 0    •  Colchicine 0.6 MG Oral Tab, 1 tablet at start of gout attack . May repeat in 4-6 hours.  No greater than 2 pills daily (Patient taking differently: Take 0.6 Esequiel Maxwell MD at [facility] N/A 1/11/2011    Performed by Camilla Koenig at [facility]   • GASTRIC BYPASS,OBESITY,[address]  2007   • KNEE TOTAL REPLACEMENT Right 1/7/2020    Perfo 5.99 12/11/2020    RBC 5.07 12/11/2020    HGB 15.8 12/11/2020    HCT 47.3 12/11/2020    MCV 93.3 12/11/2020    MCH 31.2 12/11/2020    MCHC 33.4 12/11/2020    RDW 12.6 12/11/2020     12/11/2020     Lab Results   Component Value Date     12/11/2

## 2021-01-22 NOTE — ANESTHESIA PROCEDURE NOTES
Airway  Urgency: elective      General Information and Staff    Patient location during procedure: OR  Anesthesiologist: Mai Hess MD  Performed: anesthesiologist     Indications and Patient Condition  Indications for airway management: anesthe

## 2021-01-22 NOTE — ANESTHESIA POSTPROCEDURE EVALUATION
[address] [facility] Patient Status:  Hospital Outpatient Surgery   Age/Gender 59year old male MRN [id number]   Location [address] [facility] Attending Elsa Sandoval MD   Hosp Day # 0 PCP Rona Silveira MD       An

## 2021-01-22 NOTE — BRIEF OP NOTE
Pre-Operative Diagnosis: Benign prostatic hyperplasia with lower urinary tract symptoms, recurrent bladder infections     Post-Operative Diagnosis: Benign prostatic hyperplasia with lower urinary tract symptoms, recurrent bladder infections, mild urethral

## 2021-01-22 NOTE — INTERVAL H&P NOTE
Pre-op Diagnosis: Benign prostatic hyperplasia with lower urinary tract symptoms, symptom details unspecified [N40.1]    The above referenced H&P was reviewed by Kei Kirkpatrick MD on 1/22/2021, the patient was examined and no significant changes have occur

## 2021-01-22 NOTE — PLAN OF CARE
Patient A/O X 4, VSS, IVF infusing per orders. CBI infusing, urine clear yellow.      Problem: Patient/Family Goals  Goal: Patient/Family Long Term Goal  Description: Patient's Long Term Goal: Discharge home    Interventions:  -   - See additional Care Plan guidelines  Outcome: Progressing     Problem: DISCHARGE PLANNING  Goal: Discharge to home or other facility with appropriate resources  Description: INTERVENTIONS:  - Identify barriers to discharge w/pt and caregiver  - Include patient/family/discharge par

## 2021-01-23 VITALS
HEART RATE: 65 BPM | BODY MASS INDEX: 44.1 KG/M2 | SYSTOLIC BLOOD PRESSURE: 140 MMHG | RESPIRATION RATE: 18 BRPM | WEIGHT: 315 LBS | DIASTOLIC BLOOD PRESSURE: 81 MMHG | HEIGHT: 71 IN | OXYGEN SATURATION: 97 % | TEMPERATURE: 98 F

## 2021-01-23 RX ORDER — SULFAMETHOXAZOLE AND TRIMETHOPRIM 800; 160 MG/1; MG/1
1 TABLET ORAL 2 TIMES DAILY
Qty: 6 TABLET | Refills: 0 | Status: SHIPPED | OUTPATIENT
Start: 2021-01-23 | End: 2021-01-26

## 2021-01-23 NOTE — PLAN OF CARE
Patient is alert and oriented x3  Up ad marzena  Voiding freely. PVRs are low. Tolerating diet  Denies pain  Patient ready for DC home. All instructions given to patient and wife; both verbalize understanding of DC plan.  All questions answered to patients le PLANNING  Goal: Discharge to home or other facility with appropriate resources  Description: INTERVENTIONS:  - Identify barriers to discharge w/pt and caregiver  - Include patient/family/discharge partner in discharge planning  - Arrange for needed dischar

## 2021-01-23 NOTE — PROGRESS NOTES
[facility] Hospitalist Progress Note                                                                   [address] [facility]  1/5/1960    SUBJECTIVE: pain is tolerable. Tolerating diet.       OBJECTIVE: all home meds on dc     BPH: per      Proph: SCDs         Ok to [employer] from my standpoint      David Beltre  [facility] Hospitalist  Pager: [phone]

## 2021-01-23 NOTE — PLAN OF CARE
Patient alert and oriented  Tolerated CBI well, clamped this morning  VSS, denies pain, will continue to monitor.     Problem: GENITOURINARY - ADULT  Goal: Absence of urinary retention  Description: INTERVENTIONS:  - Assess patient’s ability to void and emp interpreters to assist at discharge as needed  - Consider post-discharge preferences of patient/family/discharge partner  - Complete POLST form as appropriate  - Assess patient's ability to be responsible for managing their own health  - Refer to [facility] FOR REHAB MEDICINE

## 2021-01-23 NOTE — PROGRESS NOTES
[facility]  Urology Progress Note    [facility] Patient Status:  Outpatient in a Bed    1960 MRN [id number]   [facility] 3NW-A Attending Anyi Briggs MD   Hosp Day # 0 PCP Dayanara Rodriguez MD     Subjective:  Lester Ramirez

## 2021-01-25 NOTE — OPERATIVE REPORT
[facility]    PATIENT'S NAME: Nathalie Bass   ATTENDING PHYSICIAN: Joshua Martinez M.D. OPERATING PHYSICIAN: Joshua Martinez M.D.    PATIENT ACCOUNT#:   [de-identified]    LOCATION:  [address] [place] [address]  MEDICAL RECORD #:   [id number]       DATE OF BIRTH:  [birth year] proceed. OPERATIVE TECHNIQUE:  The patient was placed in the dorsal lithotomy position. A 26-Korean resectoscope sheath was passed under direct vision with the visual obturator through the anterior-posterior urethra.   There were distal urethral stric

## 2021-01-25 NOTE — PAYOR COMM NOTE
--------------  DISCHARGE REVIEW    Payor: Janay Rosa  #:  [id number]  Authorization Number: N/A      Discharge Date: 1/23/2021  3:44 PM     Admitting Physician: Farhan Simms MD  Attending Physician:  No att. providers found  Primary

## 2021-05-26 VITALS
HEART RATE: 78 BPM | WEIGHT: 300 LBS | DIASTOLIC BLOOD PRESSURE: 96 MMHG | HEIGHT: 71 IN | SYSTOLIC BLOOD PRESSURE: 136 MMHG | BODY MASS INDEX: 42 KG/M2 | OXYGEN SATURATION: 98 % | TEMPERATURE: 97.8 F | RESPIRATION RATE: 18 BRPM

## 2021-07-13 ENCOUNTER — ANESTHESIA EVENT (OUTPATIENT)
Dept: ENDOSCOPY | Facility: HOSPITAL | Age: 61
End: 2021-07-13
Payer: COMMERCIAL

## 2021-07-13 ENCOUNTER — ANESTHESIA (OUTPATIENT)
Dept: ENDOSCOPY | Facility: HOSPITAL | Age: 61
End: 2021-07-13
Payer: COMMERCIAL

## 2021-07-13 ENCOUNTER — HOSPITAL ENCOUNTER (OUTPATIENT)
Facility: HOSPITAL | Age: 61
Setting detail: HOSPITAL OUTPATIENT SURGERY
Discharge: HOME | End: 2021-07-13
Attending: INTERNAL MEDICINE | Admitting: INTERNAL MEDICINE
Payer: COMMERCIAL

## 2021-07-13 VITALS
SYSTOLIC BLOOD PRESSURE: 136 MMHG | BODY MASS INDEX: 44.1 KG/M2 | WEIGHT: 315 LBS | OXYGEN SATURATION: 97 % | HEART RATE: 59 BPM | DIASTOLIC BLOOD PRESSURE: 79 MMHG | TEMPERATURE: 98 F | HEIGHT: 71 IN | RESPIRATION RATE: 16 BRPM

## 2021-07-13 DIAGNOSIS — R63.5 ABNORMAL WEIGHT GAIN: Primary | ICD-10-CM

## 2021-07-13 DIAGNOSIS — Z98.84 HISTORY OF ROUX-EN-Y GASTRIC BYPASS: ICD-10-CM

## 2021-07-13 PROCEDURE — 88305 TISSUE EXAM BY PATHOLOGIST: CPT | Performed by: INTERNAL MEDICINE

## 2021-07-13 PROCEDURE — 0DBM8ZX EXCISION OF DESCENDING COLON, VIA NATURAL OR ARTIFICIAL OPENING ENDOSCOPIC, DIAGNOSTIC: ICD-10-PCS | Performed by: INTERNAL MEDICINE

## 2021-07-13 PROCEDURE — 0DBK8ZX EXCISION OF ASCENDING COLON, VIA NATURAL OR ARTIFICIAL OPENING ENDOSCOPIC, DIAGNOSTIC: ICD-10-PCS | Performed by: INTERNAL MEDICINE

## 2021-07-13 PROCEDURE — 0DB68ZX EXCISION OF STOMACH, VIA NATURAL OR ARTIFICIAL OPENING ENDOSCOPIC, DIAGNOSTIC: ICD-10-PCS | Performed by: INTERNAL MEDICINE

## 2021-07-13 RX ORDER — SODIUM CHLORIDE, SODIUM LACTATE, POTASSIUM CHLORIDE, CALCIUM CHLORIDE 600; 310; 30; 20 MG/100ML; MG/100ML; MG/100ML; MG/100ML
INJECTION, SOLUTION INTRAVENOUS CONTINUOUS
Status: DISCONTINUED | OUTPATIENT
Start: 2021-07-13 | End: 2021-07-13

## 2021-07-13 RX ORDER — HYDROMORPHONE HYDROCHLORIDE 1 MG/ML
0.4 INJECTION, SOLUTION INTRAMUSCULAR; INTRAVENOUS; SUBCUTANEOUS EVERY 5 MIN PRN
Status: DISCONTINUED | OUTPATIENT
Start: 2021-07-13 | End: 2021-07-13

## 2021-07-13 RX ORDER — HYDROCODONE BITARTRATE AND ACETAMINOPHEN 10; 325 MG/1; MG/1
1 TABLET ORAL AS NEEDED
Status: DISCONTINUED | OUTPATIENT
Start: 2021-07-13 | End: 2021-07-13

## 2021-07-13 RX ORDER — NALOXONE HYDROCHLORIDE 0.4 MG/ML
80 INJECTION, SOLUTION INTRAMUSCULAR; INTRAVENOUS; SUBCUTANEOUS AS NEEDED
Status: DISCONTINUED | OUTPATIENT
Start: 2021-07-13 | End: 2021-07-13

## 2021-07-13 RX ORDER — LIDOCAINE HYDROCHLORIDE 10 MG/ML
INJECTION, SOLUTION EPIDURAL; INFILTRATION; INTRACAUDAL; PERINEURAL AS NEEDED
Status: DISCONTINUED | OUTPATIENT
Start: 2021-07-13 | End: 2021-07-13 | Stop reason: SURG

## 2021-07-13 RX ORDER — HYDROCODONE BITARTRATE AND ACETAMINOPHEN 10; 325 MG/1; MG/1
2 TABLET ORAL AS NEEDED
Status: DISCONTINUED | OUTPATIENT
Start: 2021-07-13 | End: 2021-07-13

## 2021-07-13 RX ORDER — METOCLOPRAMIDE HYDROCHLORIDE 5 MG/ML
10 INJECTION INTRAMUSCULAR; INTRAVENOUS AS NEEDED
Status: DISCONTINUED | OUTPATIENT
Start: 2021-07-13 | End: 2021-07-13

## 2021-07-13 RX ORDER — ONDANSETRON 2 MG/ML
4 INJECTION INTRAMUSCULAR; INTRAVENOUS AS NEEDED
Status: DISCONTINUED | OUTPATIENT
Start: 2021-07-13 | End: 2021-07-13

## 2021-07-13 RX ADMIN — LIDOCAINE HYDROCHLORIDE 25 MG: 10 INJECTION, SOLUTION EPIDURAL; INFILTRATION; INTRACAUDAL; PERINEURAL at 13:06:00

## 2021-07-13 RX ADMIN — SODIUM CHLORIDE, SODIUM LACTATE, POTASSIUM CHLORIDE, CALCIUM CHLORIDE: 600; 310; 30; 20 INJECTION, SOLUTION INTRAVENOUS at 13:33:00

## 2021-07-13 RX ADMIN — SODIUM CHLORIDE, SODIUM LACTATE, POTASSIUM CHLORIDE, CALCIUM CHLORIDE: 600; 310; 30; 20 INJECTION, SOLUTION INTRAVENOUS at 13:06:00

## 2021-07-13 NOTE — OPERATIVE REPORT
OPERATIVE REPORT   PATIENT NAME: Dell Leija  MRN: [id number]  DATE OF OPERATION: 7/13/2021  PREOPERATIVE DIAGNOSIS:   1. History of Pooja-en-Y gastric bypass in 2007 preop weight 400 pounds dropped 130 pounds post surgery.   She has recurrent weight gain hemorrhoids no masses felt Next, the Olympus variable stiffness adult colonoscope was introduced into the rectum and advanced all the way to the to the cecum where the ileocecal valve and the MCL orifice clearly seen. The colonic prep was adequate.     Lef

## 2021-07-13 NOTE — ANESTHESIA PREPROCEDURE EVALUATION
PRE-OP EVALUATION    Patient Name: Merline Peach    Admit Diagnosis: History of Pooja-en-Y gastric bypass [Z98.84]  Body mass index 45.0-49.9, [facility] [Z68.42]  Abnormal weight gain [R63.5]    Pre-op Diagnosis: History of Pooja-en-Y gastric bypass [Z9 Injection Solution Auto-injector, Use as directed, for anaphylaxis, call 911, Disp: 2 each, Rfl: 0  Albuterol Sulfate HFA (PROAIR HFA) 108 (90 Base) MCG/ACT Inhalation Aero Soln, INHALE 2 PUFFS INTO THE LUNGS EVERY 4 TO 6 HOURS AS NEEDED FOR WHEEZING., Dis reviewed.     Anesthetic Complications  (+) history of anesthetic complications         GI/Hepatic/Renal      (+) GERD                           Cardiovascular                (+) obesity  (+) hypertension                                     Endo/Other shoulder s/p excision [facility] 9/5/08   • SPINE SURGERY PROCEDURE UNLISTED      lumbar   • TOTAL KNEE REPLACEMENT Right      Social History    Tobacco Use      Smoking status: Former Smoker        Packs/day: 1.50        Years: 20.00        Pack years: 27

## 2021-07-13 NOTE — ANESTHESIA POSTPROCEDURE EVALUATION
[address] [facility] Patient Status:  Hospital Outpatient Surgery   Age/Gender 59year old male MRN [id number]   Location [address] [place] [address] Attending Emely Sheehan MD   Hosp Day # 0 PCP MD Marilynn Quintana

## 2021-07-13 NOTE — H&P
[address] [facility] Department of  Gastroenterology  Update Health History :       Demetrius Larkin  male   Alycia Yanes MD     [id number]  1/5/1960 Primary Care Physician  Dayanara Rodriguez MD        HPI :  History Pooja en Y gastric bypass in 2007 by Location: [facility] ENDOSCOPY   • COLONOSCOPY N/A 7/3/2018    Procedure: COLONOSCOPY, POSSIBLE BIOPSY, POSSIBLE POLYPECTOMY 07134;  Surgeon: Marie Razo MD;  Location: [address] [place] [address]   • DEBRIDE MASTOID CAVITY,SIMPLE     • EAR CARTILAGE GRAFT TO FACE use: Yes      Comment: occ    Drug use: No       ALLERGIES:     Betadine [Povidone *    ITCHING, SWELLING  Ragweed                 Runny nose    Comment:  Seasonal                Runny nose  Tree, Elm               Runny nose    Comment:All trees    Curren Vitamins-Minerals (OPTISOURCE POST BARIATRIC SURG) Oral Chew Tab, Chew 2 tablets by mouth daily. , Disp: , Rfl:   Respiratory Therapy Supplies (NEBULIZER/TUBING/MOUTHPIECE) Does not apply Kit, As directed, Disp: 3 kit, Rfl: 1  Albuterol Sulfate (2.5 MG/3ML) Rose Fitzgerald MD

## 2021-09-21 ENCOUNTER — APPOINTMENT (OUTPATIENT)
Dept: GENERAL RADIOLOGY | Facility: HOSPITAL | Age: 61
End: 2021-09-21
Attending: EMERGENCY MEDICINE
Payer: COMMERCIAL

## 2021-09-21 ENCOUNTER — HOSPITAL ENCOUNTER (EMERGENCY)
Facility: HOSPITAL | Age: 61
Discharge: HOME | End: 2021-09-21
Attending: EMERGENCY MEDICINE
Payer: COMMERCIAL

## 2021-09-21 VITALS
BODY MASS INDEX: 45.1 KG/M2 | SYSTOLIC BLOOD PRESSURE: 171 MMHG | WEIGHT: 315 LBS | HEIGHT: 70 IN | HEART RATE: 64 BPM | TEMPERATURE: 97 F | DIASTOLIC BLOOD PRESSURE: 92 MMHG | RESPIRATION RATE: 17 BRPM | OXYGEN SATURATION: 100 %

## 2021-09-21 DIAGNOSIS — I10 HYPERTENSION, UNSPECIFIED TYPE: Primary | ICD-10-CM

## 2021-09-21 LAB
ALBUMIN SERPL-MCNC: 3.4 G/DL (ref 3.4–5)
ALBUMIN/GLOB SERPL: 1 {RATIO} (ref 1–2)
ALP LIVER SERPL-CCNC: 72 U/L
ALT SERPL-CCNC: 33 U/L
ANION GAP SERPL CALC-SCNC: 7 MMOL/L (ref 0–18)
AST SERPL-CCNC: 21 U/L (ref 15–37)
BASOPHILS # BLD AUTO: 0.04 X10(3) UL (ref 0–0.2)
BASOPHILS NFR BLD AUTO: 0.6 %
BILIRUB SERPL-MCNC: 0.3 MG/DL (ref 0.1–2)
BUN BLD-MCNC: 21 MG/DL (ref 7–18)
CALCIUM BLD-MCNC: 9.1 MG/DL (ref 8.5–10.1)
CHLORIDE SERPL-SCNC: 107 MMOL/L (ref 98–112)
CO2 SERPL-SCNC: 25 MMOL/L (ref 21–32)
CREAT BLD-MCNC: 1.19 MG/DL
EOSINOPHIL # BLD AUTO: 0.1 X10(3) UL (ref 0–0.7)
EOSINOPHIL NFR BLD AUTO: 1.5 %
ERYTHROCYTE [DISTWIDTH] IN BLOOD BY AUTOMATED COUNT: 12.7 %
GLOBULIN PLAS-MCNC: 3.4 G/DL (ref 2.8–4.4)
GLUCOSE BLD-MCNC: 102 MG/DL (ref 70–99)
HCT VFR BLD AUTO: 44.5 %
HGB BLD-MCNC: 15.2 G/DL
IMM GRANULOCYTES # BLD AUTO: 0.04 X10(3) UL (ref 0–1)
IMM GRANULOCYTES NFR BLD: 0.6 %
LYMPHOCYTES # BLD AUTO: 1.49 X10(3) UL (ref 1–4)
LYMPHOCYTES NFR BLD AUTO: 21.7 %
MCH RBC QN AUTO: 31 PG (ref 26–34)
MCHC RBC AUTO-ENTMCNC: 34.2 G/DL (ref 31–37)
MCV RBC AUTO: 90.6 FL
MONOCYTES # BLD AUTO: 0.58 X10(3) UL (ref 0.1–1)
MONOCYTES NFR BLD AUTO: 8.5 %
NEUTROPHILS # BLD AUTO: 4.61 X10 (3) UL (ref 1.5–7.7)
NEUTROPHILS # BLD AUTO: 4.61 X10(3) UL (ref 1.5–7.7)
NEUTROPHILS NFR BLD AUTO: 67.1 %
NT-PROBNP SERPL-MCNC: 101 PG/ML (ref ?–125)
OSMOLALITY SERPL CALC.SUM OF ELEC: 291 MOSM/KG (ref 275–295)
PLATELET # BLD AUTO: 156 10(3)UL (ref 150–450)
POTASSIUM SERPL-SCNC: 3.9 MMOL/L (ref 3.5–5.1)
PROT SERPL-MCNC: 6.8 G/DL (ref 6.4–8.2)
RBC # BLD AUTO: 4.91 X10(6)UL
SARS-COV-2 RNA RESP QL NAA+PROBE: NOT DETECTED
SODIUM SERPL-SCNC: 139 MMOL/L (ref 136–145)
TROPONIN I SERPL-MCNC: <0.045 NG/ML (ref ?–0.04)
WBC # BLD AUTO: 6.9 X10(3) UL (ref 4–11)

## 2021-09-21 PROCEDURE — 71045 X-RAY EXAM CHEST 1 VIEW: CPT | Performed by: EMERGENCY MEDICINE

## 2021-09-21 PROCEDURE — 85025 COMPLETE CBC W/AUTO DIFF WBC: CPT | Performed by: EMERGENCY MEDICINE

## 2021-09-21 PROCEDURE — 80053 COMPREHEN METABOLIC PANEL: CPT | Performed by: EMERGENCY MEDICINE

## 2021-09-21 PROCEDURE — 36415 COLL VENOUS BLD VENIPUNCTURE: CPT

## 2021-09-21 PROCEDURE — 84484 ASSAY OF TROPONIN QUANT: CPT | Performed by: EMERGENCY MEDICINE

## 2021-09-21 PROCEDURE — 93005 ELECTROCARDIOGRAM TRACING: CPT

## 2021-09-21 PROCEDURE — 93010 ELECTROCARDIOGRAM REPORT: CPT

## 2021-09-21 PROCEDURE — 83880 ASSAY OF NATRIURETIC PEPTIDE: CPT | Performed by: EMERGENCY MEDICINE

## 2021-09-21 PROCEDURE — 99284 EMERGENCY DEPT VISIT MOD MDM: CPT

## 2021-09-22 LAB
ATRIAL RATE: 58 BPM
P AXIS: 17 DEGREES
P-R INTERVAL: 204 MS
Q-T INTERVAL: 430 MS
QRS DURATION: 88 MS
QTC CALCULATION (BEZET): 422 MS
R AXIS: 12 DEGREES
T AXIS: 25 DEGREES
VENTRICULAR RATE: 58 BPM

## 2021-09-22 NOTE — ED PROVIDER NOTES
Patient Seen in: [facility] Emergency Department      History   Patient presents with:  Cough/URI    Stated Complaint: pt reports congestion, sore throat, was seen by pmd who told him he had EKG sharri*    Subjective:   HPI    59-year-old male with pas • COLONOSCOPY  8/14/2013    Procedure: COLONOSCOPY;  Surgeon: Emiliano Pagan MD;  Location: [facility] ENDOSCOPY   • COLONOSCOPY N/A 7/16/2015    Procedure: COLONOSCOPY;  Surgeon: Emiliano Pagan MD;  Location: [facility] ENDOSCOPY   • COLONOSCOPY N/A 7/3/2018    Procedure: Vaping Use      Vaping Use: Never used    Alcohol use: Yes      Comment: occ    Drug use:  No             Review of Systems    Positive for stated complaint: pt reports congestion, sore throat, was seen by pmd who told him he had EKG sharri*  Other systems are All other components within normal limits   TROPONIN I - Normal   PRO BETA NATRIURETIC PEPTIDE - Normal   RAPID SARS-COV-2 BY PCR - Normal   CBC WITH DIFFERENTIAL WITH PLATELET    Narrative:      The following orders were created for panel order CBC With Garcia Sow arrival. He states he has been compliant with his blood pressure regimen. Plan for troponin to assess for cardiac ischemia. Will order basic labs. Will observe in the emergency department and reassess.     8:50 pm  Patient's blood pressure downtrending with

## 2023-04-26 RX ORDER — METFORMIN HYDROCHLORIDE 750 MG/1
750 TABLET, EXTENDED RELEASE ORAL DAILY
COMMUNITY

## 2023-04-26 RX ORDER — TOPIRAMATE 50 MG/1
25 TABLET, FILM COATED ORAL 2 TIMES DAILY
COMMUNITY

## 2023-05-02 ENCOUNTER — HOSPITAL ENCOUNTER (OUTPATIENT)
Dept: INTERVENTIONAL RADIOLOGY/VASCULAR | Facility: HOSPITAL | Age: 63
Discharge: HOME | End: 2023-05-02
Attending: INTERNAL MEDICINE | Admitting: INTERNAL MEDICINE
Payer: MEDICARE

## 2023-05-02 VITALS
HEIGHT: 70 IN | WEIGHT: 300 LBS | RESPIRATION RATE: 20 BRPM | BODY MASS INDEX: 42.95 KG/M2 | HEART RATE: 81 BPM | SYSTOLIC BLOOD PRESSURE: 119 MMHG | OXYGEN SATURATION: 97 % | TEMPERATURE: 97 F | DIASTOLIC BLOOD PRESSURE: 82 MMHG

## 2023-05-02 DIAGNOSIS — Z01.810 PRE-OPERATIVE CARDIOVASCULAR EXAMINATION: ICD-10-CM

## 2023-05-02 DIAGNOSIS — R07.9 CHEST PAIN: ICD-10-CM

## 2023-05-02 DIAGNOSIS — R94.39 ABNORMAL STRESS TEST: ICD-10-CM

## 2023-05-02 LAB
ANION GAP SERPL CALC-SCNC: 9 MMOL/L (ref 0–18)
BUN BLD-MCNC: 37 MG/DL (ref 7–18)
BUN/CREAT SERPL: 24 (ref 10–20)
CALCIUM BLD-MCNC: 9.5 MG/DL (ref 8.5–10.1)
CHLORIDE SERPL-SCNC: 109 MMOL/L (ref 98–112)
CO2 SERPL-SCNC: 21 MMOL/L (ref 21–32)
CREAT BLD-MCNC: 1.54 MG/DL
FASTING STATUS PATIENT QL REPORTED: YES
GFR SERPLBLD BASED ON 1.73 SQ M-ARVRAT: 50 ML/MIN/1.73M2 (ref 60–?)
GLUCOSE BLD-MCNC: 96 MG/DL (ref 70–99)
GLUCOSE BLDC GLUCOMTR-MCNC: 98 MG/DL (ref 70–99)
INR BLD: 1 (ref 0.85–1.16)
OSMOLALITY SERPL CALC.SUM OF ELEC: 297 MOSM/KG (ref 275–295)
POTASSIUM SERPL-SCNC: 3.7 MMOL/L (ref 3.5–5.1)
PROTHROMBIN TIME: 13.1 SECONDS (ref 11.6–14.8)
SODIUM SERPL-SCNC: 139 MMOL/L (ref 136–145)

## 2023-05-02 PROCEDURE — 36415 COLL VENOUS BLD VENIPUNCTURE: CPT

## 2023-05-02 PROCEDURE — B2111ZZ FLUOROSCOPY OF MULTIPLE CORONARY ARTERIES USING LOW OSMOLAR CONTRAST: ICD-10-PCS | Performed by: INTERNAL MEDICINE

## 2023-05-02 PROCEDURE — 85610 PROTHROMBIN TIME: CPT | Performed by: INTERNAL MEDICINE

## 2023-05-02 PROCEDURE — 99152 MOD SED SAME PHYS/QHP 5/>YRS: CPT | Performed by: INTERNAL MEDICINE

## 2023-05-02 PROCEDURE — 4A023N7 MEASUREMENT OF CARDIAC SAMPLING AND PRESSURE, LEFT HEART, PERCUTANEOUS APPROACH: ICD-10-PCS | Performed by: INTERNAL MEDICINE

## 2023-05-02 PROCEDURE — 80048 BASIC METABOLIC PNL TOTAL CA: CPT | Performed by: INTERNAL MEDICINE

## 2023-05-02 PROCEDURE — 93458 L HRT ARTERY/VENTRICLE ANGIO: CPT | Performed by: INTERNAL MEDICINE

## 2023-05-02 PROCEDURE — 82962 GLUCOSE BLOOD TEST: CPT

## 2023-05-02 RX ORDER — MIDAZOLAM HYDROCHLORIDE 1 MG/ML
INJECTION INTRAMUSCULAR; INTRAVENOUS
Status: COMPLETED
Start: 2023-05-02 | End: 2023-05-02

## 2023-05-02 RX ORDER — DULAGLUTIDE 0.75 MG/.5ML
0.75 INJECTION, SOLUTION SUBCUTANEOUS WEEKLY
COMMUNITY
Start: 2023-03-27

## 2023-05-02 RX ORDER — ASPIRIN 81 MG/1
324 TABLET, CHEWABLE ORAL ONCE
Status: COMPLETED | OUTPATIENT
Start: 2023-05-02 | End: 2023-05-02

## 2023-05-02 RX ORDER — HEPARIN SODIUM 1000 [USP'U]/ML
INJECTION, SOLUTION INTRAVENOUS; SUBCUTANEOUS
Status: COMPLETED
Start: 2023-05-02 | End: 2023-05-02

## 2023-05-02 RX ORDER — CARVEDILOL 25 MG/1
25 TABLET ORAL 2 TIMES DAILY
COMMUNITY
Start: 2023-02-16

## 2023-05-02 RX ORDER — SODIUM CHLORIDE 9 MG/ML
INJECTION, SOLUTION INTRAVENOUS ONCE
Status: COMPLETED | OUTPATIENT
Start: 2023-05-02 | End: 2023-05-02

## 2023-05-02 RX ORDER — LIDOCAINE HYDROCHLORIDE 20 MG/ML
INJECTION, SOLUTION EPIDURAL; INFILTRATION; INTRACAUDAL; PERINEURAL
Status: COMPLETED
Start: 2023-05-02 | End: 2023-05-02

## 2023-05-02 RX ORDER — ASPIRIN 81 MG/1
TABLET, CHEWABLE ORAL
Status: COMPLETED
Start: 2023-05-02 | End: 2023-05-02

## 2023-05-02 RX ORDER — VERAPAMIL HYDROCHLORIDE 2.5 MG/ML
INJECTION, SOLUTION INTRAVENOUS
Status: COMPLETED
Start: 2023-05-02 | End: 2023-05-02

## 2023-05-02 RX ADMIN — SODIUM CHLORIDE: 9 INJECTION, SOLUTION INTRAVENOUS at 11:15:00

## 2023-05-02 RX ADMIN — ASPIRIN 324 MG: 81 TABLET, CHEWABLE ORAL at 11:50:00

## 2023-05-02 NOTE — IVS NOTE
DISCHARGE NOTE     Pt is able to sit up and ambulate without difficulty. Pt voided and tolerated fluids and food. Right radial procedural site remains dry and intact with good circulation, motion, and sensation. No signs and symptoms of bleeding/hematoma noted. Pt denies any pain or discomfort at this time. IV access removed  Instruction provided, patient/family verbalizes understanding. Dr. August Police spoke with patient/family post procedure.      Pt discharge via wheelchair to [address]     Follow up Appointment: 5/9 at 11am with Jovanni Atkinson Prescription: n/a

## 2023-05-02 NOTE — PROCEDURES
[facility]    Cardiac Cath Procedure Note  Alexandria Darby Patient Status:  Outpatient    1960 MRN [id number]   Location [facility] Attending Iva Michelle MD   Hosp Day # 0 PCP Milly Hartman MD       Cardiologist: Elmer Bull MD  Primary Proceduralist: Elmer Bull MD  Procedure Performed: LHC and LV  Date of Procedure: 2023   Indication: Positive stress test    Summary of procedure:  Normal coronary anatomy      Assessment:  False positive stress test  Stable for elective knee surgery without further cardiac testing      Recommendations:  Continue aggressive risk factor modification  Discharge home 2 hours      Left Ventriculography and hemodynamics:   LV EF not done due to CKD  LV EDP 15 mmHg no further fluids given in the lab, continue hydration protocol  No gradient across aortic valve        Coronary Angiography  RCA:  Dominant and free of obstructive disease, supplies PDA and PL    Left main:  Free of obstructive disease    Left anterior descending:  Free of obstructive disease, supplies multiple diagonals which are non-obstructive    Circumflex:  Free of obstructive disease, supplies multiple OM branches which are patent        Summary of Case: After written informed consent was obtained from the patient, patient was brought to the cardiac catheterization laboratory. Patient was prepped and draped in the usual sterile fashion. Lidocaine 1% was used to infiltrate the right radial artery for local anesthesia and a 6 Korean introducer sheath was inserted into the right radial artery. Selective coronary angiography performed with JR4 catheter for RCA and JL3.5 catheter for LCA. Angiography performed in standard projections. 6 Bengali JR4 catheter placed in LV for hemodynamics.     Specimen sent to: No specimen collected  Estimated blood loss: 10 cc  Closure:  TR band      IV was maintained by RN and moderate conscious sedation of versed and fentanyl was given. Patient was assessed and monitoring of oxygen, heart rate and blood pressure by nurse and myself during the exam 35 minutes.       Romeo Montemayor MD  05/02/23

## 2023-05-02 NOTE — DISCHARGE INSTRUCTIONS
TransRadial Angiogram Discharge Instructions    The following instructions are for patients who have had an angiogram, cardiac cath, angioplasty, or stent through the radial artery. Proper skin puncture site care is important during the healing period. This guideline should help to remind you of the verbal instructions you received from your physician or nurse. Site Care: For 5 days after the procedure, make sure wrist is not submerged in water or any liquid   Leave bandage in place for 24 hours. Then, gently wash with soap and water. Do not put any other bandage, ointment, powders, or creams to site. For local swelling: apply ice   If bleeding occurs: Elevate hand above heart and apply local pressure    Activity/Driving     Avoid wrist flexion, extension, and fine motor activities (i.e. texting, typing, using computer mouse, etc.) for 24 hours   Do not drive for 24 hours   Do not lift or pull anything heavier than 5 pounds with affected hand for 1 week    Additional Instructions:   Do not take glucophage/metformin containing products for at least 48 hours after procedure, unless otherwise directed by your physician.     When to contact physician:Call Dr. Severo Bores at [phone] right away if you experience     Swelling, pain, or bleeding at the site that is not relieved by applying ice or pressure   Signs of infection: Redness, warmth, drainage at the site, chills, or temperature of 100.5 or greater   Changes in sensation, numbness, or tingling of affected hand

## 2023-05-02 NOTE — INTERVAL H&P NOTE
Pre-op Diagnosis: * No pre-op diagnosis entered *    The above referenced H&P was reviewed by Aidan Torres MD on 5/2/2023, the patient was examined and no significant changes have occurred in the patient's condition since the H&P was performed. I discussed with the patient and/or legal representative the potential benefits, risks and side effects of this procedure; the likelihood of the patient achieving goals; and potential problems that might occur during recuperation. I discussed reasonable alternatives to the procedure, including risks, benefits and side effects related to the alternatives and risks related to not receiving this procedure. We will proceed with procedure as planned.

## 2023-05-09 RX ORDER — NICOTINE POLACRILEX 4 MG
30 LOZENGE BUCCAL
Status: CANCELLED | OUTPATIENT
Start: 2023-05-09

## 2023-05-09 RX ORDER — NICOTINE POLACRILEX 4 MG
15 LOZENGE BUCCAL
Status: CANCELLED | OUTPATIENT
Start: 2023-05-09

## 2023-05-09 RX ORDER — SCOLOPAMINE TRANSDERMAL SYSTEM 1 MG/1
1 PATCH, EXTENDED RELEASE TRANSDERMAL ONCE
Status: CANCELLED | OUTPATIENT
Start: 2023-05-09 | End: 2023-05-09

## 2023-05-09 RX ORDER — DEXTROSE MONOHYDRATE 25 G/50ML
50 INJECTION, SOLUTION INTRAVENOUS
Status: CANCELLED | OUTPATIENT
Start: 2023-05-09

## 2023-05-16 ENCOUNTER — LABORATORY ENCOUNTER (OUTPATIENT)
Dept: LAB | Facility: HOSPITAL | Age: 63
End: 2023-05-16
Attending: ORTHOPAEDIC SURGERY
Payer: MEDICARE

## 2023-05-16 DIAGNOSIS — Z01.818 PRE-OP TESTING: ICD-10-CM

## 2023-05-16 LAB
ANTIBODY SCREEN: NEGATIVE
RH BLOOD TYPE: POSITIVE

## 2023-05-16 PROCEDURE — 86850 RBC ANTIBODY SCREEN: CPT

## 2023-05-16 PROCEDURE — 87081 CULTURE SCREEN ONLY: CPT

## 2023-05-16 PROCEDURE — 86901 BLOOD TYPING SEROLOGIC RH(D): CPT

## 2023-05-16 PROCEDURE — 86900 BLOOD TYPING SEROLOGIC ABO: CPT

## 2023-06-06 ENCOUNTER — APPOINTMENT (OUTPATIENT)
Dept: GENERAL RADIOLOGY | Facility: HOSPITAL | Age: 63
End: 2023-06-06
Attending: ORTHOPAEDIC SURGERY
Payer: MEDICARE

## 2023-06-06 ENCOUNTER — HOSPITAL ENCOUNTER (INPATIENT)
Facility: HOSPITAL | Age: 63
LOS: 1 days | Discharge: HOME HEALTH | End: 2023-06-07
Attending: ORTHOPAEDIC SURGERY | Admitting: ORTHOPAEDIC SURGERY
Payer: MEDICARE

## 2023-06-06 ENCOUNTER — ANESTHESIA (OUTPATIENT)
Dept: SURGERY | Facility: HOSPITAL | Age: 63
End: 2023-06-06
Payer: MEDICARE

## 2023-06-06 ENCOUNTER — ANESTHESIA EVENT (OUTPATIENT)
Dept: SURGERY | Facility: HOSPITAL | Age: 63
End: 2023-06-06
Payer: MEDICARE

## 2023-06-06 DIAGNOSIS — Z01.818 PRE-OP TESTING: Primary | ICD-10-CM

## 2023-06-06 PROBLEM — M17.12 PRIMARY OSTEOARTHRITIS OF LEFT KNEE: Chronic | Status: ACTIVE | Noted: 2023-06-06

## 2023-06-06 LAB
CREAT BLD-MCNC: 1.69 MG/DL
GFR SERPLBLD BASED ON 1.73 SQ M-ARVRAT: 44 ML/MIN/1.73M2 (ref 60–?)
GLUCOSE BLD-MCNC: 137 MG/DL (ref 70–99)

## 2023-06-06 PROCEDURE — 88311 DECALCIFY TISSUE: CPT | Performed by: ORTHOPAEDIC SURGERY

## 2023-06-06 PROCEDURE — 82565 ASSAY OF CREATININE: CPT | Performed by: ORTHOPAEDIC SURGERY

## 2023-06-06 PROCEDURE — 82962 GLUCOSE BLOOD TEST: CPT

## 2023-06-06 PROCEDURE — 88305 TISSUE EXAM BY PATHOLOGIST: CPT | Performed by: ORTHOPAEDIC SURGERY

## 2023-06-06 PROCEDURE — 94660 CPAP INITIATION&MGMT: CPT

## 2023-06-06 PROCEDURE — 97530 THERAPEUTIC ACTIVITIES: CPT

## 2023-06-06 PROCEDURE — 76942 ECHO GUIDE FOR BIOPSY: CPT | Performed by: STUDENT IN AN ORGANIZED HEALTH CARE EDUCATION/TRAINING PROGRAM

## 2023-06-06 PROCEDURE — 0SRD0J9 REPLACEMENT OF LEFT KNEE JOINT WITH SYNTHETIC SUBSTITUTE, CEMENTED, OPEN APPROACH: ICD-10-PCS | Performed by: ORTHOPAEDIC SURGERY

## 2023-06-06 PROCEDURE — 5A09357 ASSISTANCE WITH RESPIRATORY VENTILATION, LESS THAN 24 CONSECUTIVE HOURS, CONTINUOUS POSITIVE AIRWAY PRESSURE: ICD-10-PCS | Performed by: ORTHOPAEDIC SURGERY

## 2023-06-06 PROCEDURE — 3E0T3BZ INTRODUCTION OF ANESTHETIC AGENT INTO PERIPHERAL NERVES AND PLEXI, PERCUTANEOUS APPROACH: ICD-10-PCS | Performed by: STUDENT IN AN ORGANIZED HEALTH CARE EDUCATION/TRAINING PROGRAM

## 2023-06-06 PROCEDURE — 73560 X-RAY EXAM OF KNEE 1 OR 2: CPT | Performed by: ORTHOPAEDIC SURGERY

## 2023-06-06 PROCEDURE — 97161 PT EVAL LOW COMPLEX 20 MIN: CPT

## 2023-06-06 DEVICE — IMPLANTABLE DEVICE
Type: IMPLANTABLE DEVICE | Site: KNEE | Status: FUNCTIONAL
Brand: PERSONA® NATURAL TIBIA®

## 2023-06-06 DEVICE — PSN ALL POLY PAT PLY 35MM: Type: IMPLANTABLE DEVICE | Site: KNEE | Status: FUNCTIONAL

## 2023-06-06 DEVICE — IMPLANTABLE DEVICE
Type: IMPLANTABLE DEVICE | Site: KNEE | Status: FUNCTIONAL
Brand: PERSONA®

## 2023-06-06 DEVICE — IMPLANTABLE DEVICE
Type: IMPLANTABLE DEVICE | Site: KNEE | Status: FUNCTIONAL
Brand: PERSONA® VIVACIT-E®

## 2023-06-06 DEVICE — IMPLANTABLE DEVICE
Type: IMPLANTABLE DEVICE | Site: KNEE | Status: FUNCTIONAL
Brand: REFOBACIN® BONE CEMENT R

## 2023-06-06 RX ORDER — NICOTINE POLACRILEX 4 MG
30 LOZENGE BUCCAL
Status: DISCONTINUED | OUTPATIENT
Start: 2023-06-06 | End: 2023-06-07

## 2023-06-06 RX ORDER — DEXAMETHASONE SODIUM PHOSPHATE 10 MG/ML
8 INJECTION, SOLUTION INTRAMUSCULAR; INTRAVENOUS ONCE
Status: COMPLETED | OUTPATIENT
Start: 2023-06-07 | End: 2023-06-07

## 2023-06-06 RX ORDER — DEXAMETHASONE SODIUM PHOSPHATE 10 MG/ML
INJECTION, SOLUTION INTRAMUSCULAR; INTRAVENOUS AS NEEDED
Status: DISCONTINUED | OUTPATIENT
Start: 2023-06-06 | End: 2023-06-06 | Stop reason: SURG

## 2023-06-06 RX ORDER — CARVEDILOL 12.5 MG/1
25 TABLET ORAL 2 TIMES DAILY
Status: DISCONTINUED | OUTPATIENT
Start: 2023-06-06 | End: 2023-06-07

## 2023-06-06 RX ORDER — ONDANSETRON 2 MG/ML
4 INJECTION INTRAMUSCULAR; INTRAVENOUS EVERY 6 HOURS PRN
Status: DISCONTINUED | OUTPATIENT
Start: 2023-06-06 | End: 2023-06-07

## 2023-06-06 RX ORDER — TRANEXAMIC ACID 10 MG/ML
1000 INJECTION, SOLUTION INTRAVENOUS ONCE
Status: COMPLETED | OUTPATIENT
Start: 2023-06-06 | End: 2023-06-06

## 2023-06-06 RX ORDER — BUPRENORPHINE HYDROCHLORIDE 0.32 MG/ML
INJECTION INTRAMUSCULAR; INTRAVENOUS AS NEEDED
Status: DISCONTINUED | OUTPATIENT
Start: 2023-06-06 | End: 2023-06-06 | Stop reason: SURG

## 2023-06-06 RX ORDER — DEXAMETHASONE SODIUM PHOSPHATE 4 MG/ML
VIAL (ML) INJECTION AS NEEDED
Status: DISCONTINUED | OUTPATIENT
Start: 2023-06-06 | End: 2023-06-06 | Stop reason: SURG

## 2023-06-06 RX ORDER — HYDROMORPHONE HYDROCHLORIDE 1 MG/ML
0.6 INJECTION, SOLUTION INTRAMUSCULAR; INTRAVENOUS; SUBCUTANEOUS EVERY 5 MIN PRN
Status: DISCONTINUED | OUTPATIENT
Start: 2023-06-06 | End: 2023-06-06 | Stop reason: HOSPADM

## 2023-06-06 RX ORDER — DIPHENHYDRAMINE HYDROCHLORIDE 50 MG/ML
12.5 INJECTION INTRAMUSCULAR; INTRAVENOUS EVERY 4 HOURS PRN
Status: DISCONTINUED | OUTPATIENT
Start: 2023-06-06 | End: 2023-06-07

## 2023-06-06 RX ORDER — KETOROLAC TROMETHAMINE 15 MG/ML
15 INJECTION, SOLUTION INTRAMUSCULAR; INTRAVENOUS EVERY 6 HOURS
Status: COMPLETED | OUTPATIENT
Start: 2023-06-06 | End: 2023-06-07

## 2023-06-06 RX ORDER — OXYCODONE HYDROCHLORIDE 5 MG/1
2.5 TABLET ORAL EVERY 4 HOURS PRN
Status: DISCONTINUED | OUTPATIENT
Start: 2023-06-06 | End: 2023-06-07

## 2023-06-06 RX ORDER — ACETAMINOPHEN 325 MG/1
650 TABLET ORAL ONCE
Status: COMPLETED | OUTPATIENT
Start: 2023-06-06 | End: 2023-06-06

## 2023-06-06 RX ORDER — ACETAMINOPHEN 500 MG
1000 TABLET ORAL ONCE AS NEEDED
Status: DISCONTINUED | OUTPATIENT
Start: 2023-06-06 | End: 2023-06-06 | Stop reason: HOSPADM

## 2023-06-06 RX ORDER — DIPHENHYDRAMINE HYDROCHLORIDE 50 MG/ML
25 INJECTION INTRAMUSCULAR; INTRAVENOUS ONCE AS NEEDED
Status: ACTIVE | OUTPATIENT
Start: 2023-06-06 | End: 2023-06-06

## 2023-06-06 RX ORDER — HYDROMORPHONE HYDROCHLORIDE 1 MG/ML
0.4 INJECTION, SOLUTION INTRAMUSCULAR; INTRAVENOUS; SUBCUTANEOUS EVERY 2 HOUR PRN
Status: DISCONTINUED | OUTPATIENT
Start: 2023-06-06 | End: 2023-06-07

## 2023-06-06 RX ORDER — TAMSULOSIN HYDROCHLORIDE 0.4 MG/1
0.8 CAPSULE ORAL DAILY
Status: DISCONTINUED | OUTPATIENT
Start: 2023-06-07 | End: 2023-06-07

## 2023-06-06 RX ORDER — HYDROCODONE BITARTRATE AND ACETAMINOPHEN 5; 325 MG/1; MG/1
1 TABLET ORAL ONCE AS NEEDED
Status: DISCONTINUED | OUTPATIENT
Start: 2023-06-06 | End: 2023-06-06 | Stop reason: HOSPADM

## 2023-06-06 RX ORDER — NEOSTIGMINE METHYLSULFATE 1 MG/ML
INJECTION, SOLUTION INTRAVENOUS AS NEEDED
Status: DISCONTINUED | OUTPATIENT
Start: 2023-06-06 | End: 2023-06-06 | Stop reason: SURG

## 2023-06-06 RX ORDER — NALOXONE HYDROCHLORIDE 0.4 MG/ML
80 INJECTION, SOLUTION INTRAMUSCULAR; INTRAVENOUS; SUBCUTANEOUS AS NEEDED
Status: DISCONTINUED | OUTPATIENT
Start: 2023-06-06 | End: 2023-06-06 | Stop reason: HOSPADM

## 2023-06-06 RX ORDER — CEFAZOLIN SODIUM IN 0.9 % NACL 3 G/100 ML
3 INTRAVENOUS SOLUTION, PIGGYBACK (ML) INTRAVENOUS ONCE
Status: COMPLETED | OUTPATIENT
Start: 2023-06-06 | End: 2023-06-06

## 2023-06-06 RX ORDER — GLYCOPYRROLATE 0.2 MG/ML
INJECTION, SOLUTION INTRAMUSCULAR; INTRAVENOUS AS NEEDED
Status: DISCONTINUED | OUTPATIENT
Start: 2023-06-06 | End: 2023-06-06 | Stop reason: SURG

## 2023-06-06 RX ORDER — MELATONIN
325
Status: DISCONTINUED | OUTPATIENT
Start: 2023-06-07 | End: 2023-06-07

## 2023-06-06 RX ORDER — SENNOSIDES 8.6 MG
17.2 TABLET ORAL NIGHTLY
Status: DISCONTINUED | OUTPATIENT
Start: 2023-06-06 | End: 2023-06-07

## 2023-06-06 RX ORDER — SCOLOPAMINE TRANSDERMAL SYSTEM 1 MG/1
1 PATCH, EXTENDED RELEASE TRANSDERMAL ONCE
Status: DISCONTINUED | OUTPATIENT
Start: 2023-06-06 | End: 2023-06-06 | Stop reason: HOSPADM

## 2023-06-06 RX ORDER — ONDANSETRON 2 MG/ML
4 INJECTION INTRAMUSCULAR; INTRAVENOUS EVERY 6 HOURS PRN
Status: DISCONTINUED | OUTPATIENT
Start: 2023-06-06 | End: 2023-06-06 | Stop reason: HOSPADM

## 2023-06-06 RX ORDER — LIDOCAINE HYDROCHLORIDE 10 MG/ML
INJECTION, SOLUTION EPIDURAL; INFILTRATION; INTRACAUDAL; PERINEURAL AS NEEDED
Status: DISCONTINUED | OUTPATIENT
Start: 2023-06-06 | End: 2023-06-06 | Stop reason: SURG

## 2023-06-06 RX ORDER — OXYCODONE HYDROCHLORIDE 5 MG/1
5 TABLET ORAL EVERY 4 HOURS PRN
Status: DISCONTINUED | OUTPATIENT
Start: 2023-06-06 | End: 2023-06-07

## 2023-06-06 RX ORDER — POLYETHYLENE GLYCOL 3350 17 G/17G
17 POWDER, FOR SOLUTION ORAL DAILY PRN
Status: DISCONTINUED | OUTPATIENT
Start: 2023-06-06 | End: 2023-06-07

## 2023-06-06 RX ORDER — ACETAMINOPHEN 325 MG/1
TABLET ORAL
Status: COMPLETED
Start: 2023-06-06 | End: 2023-06-06

## 2023-06-06 RX ORDER — CLONIDINE HYDROCHLORIDE 0.1 MG/1
0.2 TABLET ORAL 2 TIMES DAILY
Status: DISCONTINUED | OUTPATIENT
Start: 2023-06-06 | End: 2023-06-07

## 2023-06-06 RX ORDER — HYDROMORPHONE HYDROCHLORIDE 1 MG/ML
0.2 INJECTION, SOLUTION INTRAMUSCULAR; INTRAVENOUS; SUBCUTANEOUS EVERY 2 HOUR PRN
Status: DISCONTINUED | OUTPATIENT
Start: 2023-06-06 | End: 2023-06-07

## 2023-06-06 RX ORDER — CEFAZOLIN SODIUM IN 0.9 % NACL 3 G/100 ML
3 INTRAVENOUS SOLUTION, PIGGYBACK (ML) INTRAVENOUS EVERY 8 HOURS
Status: COMPLETED | OUTPATIENT
Start: 2023-06-06 | End: 2023-06-07

## 2023-06-06 RX ORDER — KETOROLAC TROMETHAMINE 30 MG/ML
INJECTION, SOLUTION INTRAMUSCULAR; INTRAVENOUS AS NEEDED
Status: DISCONTINUED | OUTPATIENT
Start: 2023-06-06 | End: 2023-06-06 | Stop reason: SURG

## 2023-06-06 RX ORDER — HYDROCODONE BITARTRATE AND ACETAMINOPHEN 5; 325 MG/1; MG/1
2 TABLET ORAL ONCE AS NEEDED
Status: DISCONTINUED | OUTPATIENT
Start: 2023-06-06 | End: 2023-06-06 | Stop reason: HOSPADM

## 2023-06-06 RX ORDER — DEXTROSE MONOHYDRATE 25 G/50ML
50 INJECTION, SOLUTION INTRAVENOUS
Status: DISCONTINUED | OUTPATIENT
Start: 2023-06-06 | End: 2023-06-07

## 2023-06-06 RX ORDER — METOCLOPRAMIDE HYDROCHLORIDE 5 MG/ML
10 INJECTION INTRAMUSCULAR; INTRAVENOUS EVERY 8 HOURS PRN
Status: DISCONTINUED | OUTPATIENT
Start: 2023-06-06 | End: 2023-06-06 | Stop reason: HOSPADM

## 2023-06-06 RX ORDER — SODIUM CHLORIDE, SODIUM LACTATE, POTASSIUM CHLORIDE, CALCIUM CHLORIDE 600; 310; 30; 20 MG/100ML; MG/100ML; MG/100ML; MG/100ML
INJECTION, SOLUTION INTRAVENOUS CONTINUOUS
Status: DISCONTINUED | OUTPATIENT
Start: 2023-06-06 | End: 2023-06-06

## 2023-06-06 RX ORDER — ALLOPURINOL 100 MG/1
200 TABLET ORAL DAILY
Status: DISCONTINUED | OUTPATIENT
Start: 2023-06-07 | End: 2023-06-07

## 2023-06-06 RX ORDER — METOCLOPRAMIDE HYDROCHLORIDE 5 MG/ML
10 INJECTION INTRAMUSCULAR; INTRAVENOUS EVERY 8 HOURS PRN
Status: DISCONTINUED | OUTPATIENT
Start: 2023-06-06 | End: 2023-06-07

## 2023-06-06 RX ORDER — KETOROLAC TROMETHAMINE 15 MG/ML
15 INJECTION, SOLUTION INTRAMUSCULAR; INTRAVENOUS EVERY 6 HOURS
Status: DISCONTINUED | OUTPATIENT
Start: 2023-06-06 | End: 2023-06-06

## 2023-06-06 RX ORDER — NICOTINE POLACRILEX 4 MG
15 LOZENGE BUCCAL
Status: DISCONTINUED | OUTPATIENT
Start: 2023-06-06 | End: 2023-06-07

## 2023-06-06 RX ORDER — ACETAMINOPHEN 325 MG/1
650 TABLET ORAL 4 TIMES DAILY
Status: DISCONTINUED | OUTPATIENT
Start: 2023-06-06 | End: 2023-06-07

## 2023-06-06 RX ORDER — HYDROMORPHONE HYDROCHLORIDE 1 MG/ML
0.4 INJECTION, SOLUTION INTRAMUSCULAR; INTRAVENOUS; SUBCUTANEOUS EVERY 5 MIN PRN
Status: DISCONTINUED | OUTPATIENT
Start: 2023-06-06 | End: 2023-06-06 | Stop reason: HOSPADM

## 2023-06-06 RX ORDER — DIPHENHYDRAMINE HCL 25 MG
25 CAPSULE ORAL EVERY 4 HOURS PRN
Status: DISCONTINUED | OUTPATIENT
Start: 2023-06-06 | End: 2023-06-07

## 2023-06-06 RX ORDER — TRANEXAMIC ACID 10 MG/ML
INJECTION, SOLUTION INTRAVENOUS
Status: COMPLETED
Start: 2023-06-06 | End: 2023-06-06

## 2023-06-06 RX ORDER — DOCUSATE SODIUM 100 MG/1
100 CAPSULE, LIQUID FILLED ORAL 2 TIMES DAILY
Status: DISCONTINUED | OUTPATIENT
Start: 2023-06-06 | End: 2023-06-07

## 2023-06-06 RX ORDER — ONDANSETRON 2 MG/ML
INJECTION INTRAMUSCULAR; INTRAVENOUS AS NEEDED
Status: DISCONTINUED | OUTPATIENT
Start: 2023-06-06 | End: 2023-06-06 | Stop reason: SURG

## 2023-06-06 RX ORDER — ROCURONIUM BROMIDE 10 MG/ML
INJECTION, SOLUTION INTRAVENOUS AS NEEDED
Status: DISCONTINUED | OUTPATIENT
Start: 2023-06-06 | End: 2023-06-06 | Stop reason: SURG

## 2023-06-06 RX ORDER — SODIUM CHLORIDE, SODIUM LACTATE, POTASSIUM CHLORIDE, CALCIUM CHLORIDE 600; 310; 30; 20 MG/100ML; MG/100ML; MG/100ML; MG/100ML
INJECTION, SOLUTION INTRAVENOUS CONTINUOUS
Status: DISCONTINUED | OUTPATIENT
Start: 2023-06-06 | End: 2023-06-06 | Stop reason: HOSPADM

## 2023-06-06 RX ORDER — TRAMADOL HYDROCHLORIDE 50 MG/1
50 TABLET ORAL EVERY 12 HOURS SCHEDULED
Status: DISCONTINUED | OUTPATIENT
Start: 2023-06-06 | End: 2023-06-07

## 2023-06-06 RX ORDER — ENEMA 19; 7 G/133ML; G/133ML
1 ENEMA RECTAL ONCE AS NEEDED
Status: DISCONTINUED | OUTPATIENT
Start: 2023-06-06 | End: 2023-06-07

## 2023-06-06 RX ORDER — HYDROMORPHONE HYDROCHLORIDE 1 MG/ML
0.2 INJECTION, SOLUTION INTRAMUSCULAR; INTRAVENOUS; SUBCUTANEOUS EVERY 5 MIN PRN
Status: DISCONTINUED | OUTPATIENT
Start: 2023-06-06 | End: 2023-06-06 | Stop reason: HOSPADM

## 2023-06-06 RX ORDER — ACETAMINOPHEN 500 MG
1000 TABLET ORAL ONCE
Status: DISCONTINUED | OUTPATIENT
Start: 2023-06-06 | End: 2023-06-06 | Stop reason: HOSPADM

## 2023-06-06 RX ORDER — BISACODYL 10 MG
10 SUPPOSITORY, RECTAL RECTAL
Status: DISCONTINUED | OUTPATIENT
Start: 2023-06-06 | End: 2023-06-07

## 2023-06-06 RX ORDER — ASPIRIN 81 MG/1
81 TABLET ORAL 2 TIMES DAILY
Status: DISCONTINUED | OUTPATIENT
Start: 2023-06-06 | End: 2023-06-07

## 2023-06-06 RX ORDER — CYCLOBENZAPRINE HCL 10 MG
10 TABLET ORAL EVERY 8 HOURS PRN
Status: DISCONTINUED | OUTPATIENT
Start: 2023-06-06 | End: 2023-06-07

## 2023-06-06 RX ADMIN — DEXAMETHASONE SODIUM PHOSPHATE 2 MG: 10 INJECTION, SOLUTION INTRAMUSCULAR; INTRAVENOUS at 11:12:00

## 2023-06-06 RX ADMIN — SODIUM CHLORIDE, SODIUM LACTATE, POTASSIUM CHLORIDE, CALCIUM CHLORIDE: 600; 310; 30; 20 INJECTION, SOLUTION INTRAVENOUS at 13:29:00

## 2023-06-06 RX ADMIN — CEFAZOLIN SODIUM IN 0.9 % NACL 3 G: 3 G/100 ML INTRAVENOUS SOLUTION, PIGGYBACK (ML) INTRAVENOUS at 11:19:00

## 2023-06-06 RX ADMIN — TRANEXAMIC ACID 1000 MG: 10 INJECTION, SOLUTION INTRAVENOUS at 11:24:00

## 2023-06-06 RX ADMIN — NEOSTIGMINE METHYLSULFATE 3 MG: 1 INJECTION, SOLUTION INTRAVENOUS at 13:16:00

## 2023-06-06 RX ADMIN — LIDOCAINE HYDROCHLORIDE 50 MG: 10 INJECTION, SOLUTION EPIDURAL; INFILTRATION; INTRACAUDAL; PERINEURAL at 11:15:00

## 2023-06-06 RX ADMIN — BUPRENORPHINE HYDROCHLORIDE 150 MCG: 0.32 INJECTION INTRAMUSCULAR; INTRAVENOUS at 11:12:00

## 2023-06-06 RX ADMIN — GLYCOPYRROLATE 0.6 MG: 0.2 INJECTION, SOLUTION INTRAMUSCULAR; INTRAVENOUS at 13:16:00

## 2023-06-06 RX ADMIN — ONDANSETRON 4 MG: 2 INJECTION INTRAMUSCULAR; INTRAVENOUS at 12:55:00

## 2023-06-06 RX ADMIN — DEXAMETHASONE SODIUM PHOSPHATE 8 MG: 4 MG/ML VIAL (ML) INJECTION at 11:23:00

## 2023-06-06 RX ADMIN — KETOROLAC TROMETHAMINE 30 MG: 30 INJECTION, SOLUTION INTRAMUSCULAR; INTRAVENOUS at 12:55:00

## 2023-06-06 RX ADMIN — ROCURONIUM BROMIDE 40 MG: 10 INJECTION, SOLUTION INTRAVENOUS at 11:15:00

## 2023-06-06 NOTE — OPERATIVE REPORT
[address] [facility]    PATIENT'S NAME: Bull Butts   ATTENDING PHYSICIAN: Diandra Candelario M.D. OPERATING PHYSICIAN: Diandra Candelario M.D. PATIENT ACCOUNT#:   [de-identified]    LOCATION:  PACU [address] [place], [place] [address]U 8 EDWP 10  MEDICAL RECORD #:   [id number]       YOB: 1960  ADMISSION DATE:       06/06/2023      OPERATION DATE:  06/06/2023    OPERATIVE REPORT      PREOPERATIVE DIAGNOSIS:  Severe valgus osteoarthritis, left knee. POSTOPERATIVE DIAGNOSIS:  Severe valgus osteoarthritis, left knee. PROCEDURE:  Left total knee replacement with Maisha Persona implants and PSI instrumentation; #10 standard PS femur, size F tibia with 14 x 30 mm stem extension, 12 CPS poly, 35 mm patella. ASSISTANT:  Loreta Koenig PA-C. Her involvement in the case is instrumental to the appropriate, safe, and efficient carrying out of surgical procedure. MODE OF ANESTHETIC:  Spinal with adductor canal block and PKI. ESTIMATED BLOOD LOSS:  20 mL. MATERIALS:  Include preoperative TXA and Irrisept. OPERATIVE TECHNIQUE:  Taken to the operating room and placed on the operating room table in supine position. Balanced anesthetic is carried out. High-thigh tourniquet is applied, elevated without compression. Sterile prep and drape, antibiotic and time-out. Midline incision followed by a median parapatellar arthrotomy. Deep medial collateral ligament is minimally released. Preoperative valgus is 8.0 degrees per PSI. Advanced arthritic change is seen throughout, most pronounced laterally. PSI femur is placed. Distal resection follows, first an 11 and then a 10 4-in-1 cutting block; 10 mm block provides good resection margins in flexion, extension, and chamfer. Osteophytes are debulked. Meniscal remnants are removed. PSI tibia is placed. Distal resection follows. Soft tissue balance is pursued; lateral soft tissue release occurs. There is good soft tissue balance with a 12 mm spacer block in full extension. There is perceived posteromedial laxity with flexion. With this, a CPS component is chosen. Size F femur is placed, #10 PS provisional femur is placed, and the notch is resected. A 12 CPS poly is passed. Good soft tissue balance and mobility/stability are all achieved. Patellar thickness is 22 mm. After resection and resurfacing with a 35 mm patella, patellar thickness is re-created. Provisional components removed. Copious irrigation is washed. Final tibial and femoral preparation follows. Then, 40 mL of PKI are injected posterior to the posterior capsule after cautious aspiration; remaining 20 mL are injected around the arthrotomy at the completion of the case. Cement is mixed on the back table. In sequential fashion, the tibia, femur, and patella are cemented into position. Cement is allowed to cure. Knee is placed over a bump. The arthrotomy is closed with #2 PDO Quill suture. After arthrotomy closure, the knee is flexed to 110 degrees with good security. This is a doubled Quill closure with 2 suture packets. Subcutaneous tissues are closed with 2-0 Vicryl. Skin is closed sterile skin clips. Sterile compressive dressing is applied. Transferred to recovery room awake and stable.     Dictated By Janey Obrien M.D.  d: 06/06/2023 12:56:14  t: 06/06/2023 15:16:27  [facility] [id number]  /

## 2023-06-06 NOTE — PROGRESS NOTES
NURSING ADMISSION NOTE      Patient admitted from PACU s/p left total knee arthroplasty. Oriented to room. Pt c/o moderate left knee pain, gel ice. Ace wrap to LLE, CDI. Safety precautions initiated. Bed in low position. Call light in reach. Spouse at bedside. ELHAM SPRING paged with new consult.

## 2023-06-06 NOTE — ANESTHESIA PROCEDURE NOTES
Regional Block    Performed by: Emilio Victoria DO  Authorized by: Emilio Victoria DO      General Information and Staff    Start Time:   Anesthesiologist:  Emilio Victoria DO  Performed by: Anesthesiologist  Patient Location:  OR      Site Identification: real time ultrasound guided and image stored and retrievable    Block site/laterality marked before start: site marked  Reason for Block: at surgeon's request and post-op pain management    Preanesthetic Checklist: 2 patient identifers, IV checked, risks and benefits discussed, monitors and equipment checked, pre-op evaluation, timeout performed, anesthesia consent, sterile technique used, no prohibitive neurological deficits and no local skin infection at insertion site      Procedure Details    Patient Position:   Prep: ChloraPrep    Monitoring:  Cardiac monitor, continuous pulse ox and blood pressure cuff  Laterality:  Left  Injection Technique:  Single-shot    Needle    Needle Type:  Short-bevel and echogenic  Needle Localization:  Ultrasound guidance  Reason for Ultrasound Use: appropriate spread of the medication was noted in real time and no ultrasound evidence of intravascular and/or intraneural injection            Assessment    Injection Assessment:  Good spread noted, negative resistance, negative aspiration for heme, incremental injection and low pressure  Heart Rate Change: No    - Patient tolerated block procedure well without evidence of immediate block related complications.      Medications      Additional Comments    Medication:  Ropivacaine 0.375% 20mL w 2mg dexamethasone and 150 mcg buprenorphine

## 2023-06-06 NOTE — INTERVAL H&P NOTE
Pre-op Diagnosis: OSTEOARTHRITIS LEFT KNEE    The above referenced H&P was reviewed by Neville Garcia MD on 6/6/2023, the patient was examined and no significant changes have occurred in the patient's condition since the H&P was performed. I discussed with the patient and/or legal representative the potential benefits, risks and side effects of this procedure; the likelihood of the patient achieving goals; and potential problems that might occur during recuperation. I discussed reasonable alternatives to the procedure, including risks, benefits and side effects related to the alternatives and risks related to not receiving this procedure. We will proceed with procedure as planned.

## 2023-06-06 NOTE — ANESTHESIA PROCEDURE NOTES
Airway  Date/Time: 6/6/2023 11:17 AM  Urgency: elective      General Information and Staff    Patient location during procedure: OR  Anesthesiologist: Apolonia Wu DO  Performed: anesthesiologist   Performed by: Apolonia Wu DO  Authorized by: Apolonia Wu DO      Indications and Patient Condition  Indications for airway management: anesthesia  Spontaneous ventilation: present  Sedation level: deep  Preoxygenated: yes  Patient position: sniffing  Mask difficulty assessment: 1 - vent by mask    Final Airway Details  Final airway type: endotracheal airway      Successful airway: ETT  Cuffed: yes   Successful intubation technique: direct laryngoscopy  Endotracheal tube insertion site: oral  Blade: Cecilia  Blade size: #4  ETT size (mm): 7.5    Cormack-Lehane Classification: grade I - full view of glottis  Placement verified by: capnometry   Measured from: lips  ETT to lips (cm): 22  Number of attempts at approach: 1  Ventilation between attempts: none

## 2023-06-06 NOTE — BRIEF OP NOTE
Pre-Operative Diagnosis: OSTEOARTHRITIS LEFT KNEE     Post-Operative Diagnosis: OSTEOARTHRITIS LEFT KNEE      Procedure Performed:   LEFT TOTAL KNEE ARTHROPLASTY    Surgeon(s) and Role:     * Lavonia Denver, MD - Primary    Assistant(s):  PA:  BETZY KatzN: LACY Park     Surgical Findings: above     Specimen: bone     Estimated Blood Loss: Blood Output: 20 mL (6/6/2023 12:44 PM)      Rosey Perez MD  6/6/2023  12:49 PM

## 2023-06-07 VITALS
OXYGEN SATURATION: 97 % | HEART RATE: 77 BPM | SYSTOLIC BLOOD PRESSURE: 144 MMHG | RESPIRATION RATE: 17 BRPM | WEIGHT: 309 LBS | HEIGHT: 70 IN | BODY MASS INDEX: 44.24 KG/M2 | TEMPERATURE: 98 F | DIASTOLIC BLOOD PRESSURE: 88 MMHG

## 2023-06-07 LAB
GLUCOSE BLD-MCNC: 117 MG/DL (ref 70–99)
GLUCOSE BLD-MCNC: 141 MG/DL (ref 70–99)
HCT VFR BLD AUTO: 34.7 %
HGB BLD-MCNC: 11.8 G/DL

## 2023-06-07 PROCEDURE — 97165 OT EVAL LOW COMPLEX 30 MIN: CPT

## 2023-06-07 PROCEDURE — 97530 THERAPEUTIC ACTIVITIES: CPT

## 2023-06-07 PROCEDURE — 97535 SELF CARE MNGMENT TRAINING: CPT

## 2023-06-07 PROCEDURE — 85018 HEMOGLOBIN: CPT | Performed by: ORTHOPAEDIC SURGERY

## 2023-06-07 PROCEDURE — 85014 HEMATOCRIT: CPT | Performed by: ORTHOPAEDIC SURGERY

## 2023-06-07 PROCEDURE — 82962 GLUCOSE BLOOD TEST: CPT

## 2023-06-07 PROCEDURE — 94799 UNLISTED PULMONARY SVC/PX: CPT

## 2023-06-07 PROCEDURE — 97116 GAIT TRAINING THERAPY: CPT

## 2023-06-07 PROCEDURE — 97110 THERAPEUTIC EXERCISES: CPT

## 2023-06-07 RX ORDER — ASPIRIN 81 MG/1
81 TABLET ORAL 2 TIMES DAILY
Refills: 0 | Status: SHIPPED | COMMUNITY
Start: 2023-06-07

## 2023-06-07 RX ORDER — PSEUDOEPHEDRINE HCL 30 MG
100 TABLET ORAL 2 TIMES DAILY
Refills: 0 | Status: SHIPPED | COMMUNITY
Start: 2023-06-07

## 2023-06-07 RX ORDER — HYDROCODONE BITARTRATE AND ACETAMINOPHEN 10; 325 MG/1; MG/1
1-2 TABLET ORAL EVERY 6 HOURS PRN
Refills: 0 | Status: SHIPPED | COMMUNITY
Start: 2023-06-07

## 2023-06-07 RX ORDER — CELECOXIB 200 MG/1
200 CAPSULE ORAL DAILY
Refills: 0 | Status: SHIPPED | COMMUNITY
Start: 2023-06-07

## 2023-06-07 NOTE — PLAN OF CARE
Patient POD 1 LTK Dr Jean Gamez. Vitals stable, pain controlled. Dressing dry and intact. For discharge later today.

## 2023-06-07 NOTE — PHYSICAL THERAPY NOTE
PHYSICAL THERAPY TREATMENT NOTE - INPATIENT    Room Number: 252/858-A     Session: 1     Number of Visits to Meet Established Goals: 4    Presenting Problem: s/p L TKA 23  Co-Morbidities : R TKA 2020, R TSA, HTN, DDD  ASSESSMENT     Pt continues to present with impaired strength and decreased ROM L knee  , decreased endurance and impaired balance below PLOF s/p L TKA 23 . Pt will continue to benefit from ongoing IP PT to maximize functional independence. The AM-PAC '6-Clicks' Inpatient Basic Mobility Short Form was completed and this patient is demonstrating a 29% degree of impairment in mobility. Research supports that patients with this level of impairment may benefit from [address] . DISCHARGE RECOMMENDATIONS  PT Discharge Recommendations: Home with home health PT     PLAN  PT Treatment Plan: Bed mobility; Endurance; Energy conservation;Patient education;Balance training;Transfer training;Stair training;Strengthening;Gait training;Range of motion  Rehab Potential : Good  Frequency (Obs): Daily    CURRENT GOALS        Goal #1  Patient is able to demonstrate supine - sit EOB @ level: supervision      Goal #2  Patient is able to demonstrate transfers Sit to/from Stand at assistance level: supervison         Goal #3     Patient is able to ambulate 150 feet with assistive device at assistance level: supervision   Goal #4     Patient will negotiate 4 stairs/one curb w/ assistive device and supervision   Goal #5     Patient verbalizes and/or demonstrates all precautions and safety concerns independently    Goal #6        Goal Comments: Goals established on 2023 all goals adequate for d/c       SUBJECTIVE  Pt reports pain is controlled   \"I want to walk some more if that's ok\"     OBJECTIVE       WEIGHT BEARING RESTRICTION  Weight Bearing Restriction: L lower extremity           L Lower Extremity: Weight Bearing as Tolerated    PAIN ASSESSMENT   Ratin  Location: L knee  Management Techniques: Activity promotion; Body mechanics;Breathing techniques;Relaxation;Repositioning    BALANCE                                                                                                                       Static Sitting: Good  Dynamic Sitting: Fair +           Static Standing: Fair -  Dynamic Standing: Fair -    ACTIVITY TOLERANCE                         O2 WALK         AM-PAC '6-Clicks' INPATIENT SHORT FORM - BASIC MOBILITY  How much difficulty does the patient currently have. .. Patient Difficulty: Turning over in bed (including adjusting bedclothes, sheets and blankets)?: None   Patient Difficulty: Sitting down on and standing up from a chair with arms (e.g., wheelchair, bedside commode, etc.): None   Patient Difficulty: Moving from lying on back to sitting on the side of the bed?: A Little   How much help from another person does the patient currently need. .. Help from Another: Moving to and from a bed to a chair (including a wheelchair)?: None   Help from Another: Need to walk in hospital room?: A Little   Help from Another: Climbing 3-5 steps with a railing?: A Little       AM-PAC Score:  Raw Score: 21   Approx Degree of Impairment: 28.97%   Standardized Score (AM-PAC Scale): 50.25   CMS Modifier (G-Code): CJ    FUNCTIONAL ABILITY STATUS  Gait Assessment   Functional Mobility/Gait Assessment  Gait Assistance: Supervision  Distance (ft): 100,150  Assistive Device: Rolling walker  Pattern: L Decreased stance time  Stairs: Stairs; Car transfer  How Many Stairs: 4  Device: 2 Rails  Assist: Supervision  Pattern: Ascend and Descend  Ascend and Descend : Step to  Car transfer: [address] Provided  Pt presents in semi sup in bed. Pt states he will sleep in recliner upon return home. Significance of achieving good ROM in a timely fashion explained. Pt performed seated and standing therex per TKA protocol.  Pt gait trained c RW cues for reciprocal gait pattern, WBAT and proper integration of RW with good return demo. Pt t/f trained as noted above c cues for sequencing. Pt has RW for home use, in house walker sized for pt. Pt left in chair, needs met. All questions and concerns addressed. Bed Mobility:  Rolling: NT   Supine<>Sit: supervision HOB elevated    Sit<>Supine: NT pt will sleep in recliner      Transfer Mobility:  Sit<>Stand: supervision    Stand<>Sit: supervision cues for reaching back    Gait: supervision c RW           THERAPEUTIC EXERCISES  Lower Extremity Ankle pumps  Hip AB/AD  Heel slides  Knee extension  LAQ  Leg slides  Quad sets  HS curl      Upper Extremity      Position Sitting & Standing     Repetitions   10   Sets   1     Patient End of Session: Up in chair;Needs met;Call light within reach;RN aware of session/findings; All patient questions and concerns addressed; Ice applied; Alarm set    PT Session Time: 38 minutes  Gait Training: 15 minutes  Therapeutic Activity: 15 minutes  Therapeutic Exercise: 8 minutes   Neuromuscular Re-education:  minutes

## 2023-06-07 NOTE — CM/SW NOTE
06/07/23 0800   CM/SW Referral Data   Referral Source Social Work (self-referral)   Reason for Referral Discharge planning   Informant EMR;Clinical Staff Member   Discharge Needs   Anticipated D/C needs Home health care;Medical equipment       HOME SITUATION  Type of Home: House   Home Layout: One level  Stairs to Enter : 4  Railing: No     Lives With: Spouse  Drives: Yes  Patient Owned Equipment: None  Patient Regularly Uses: Glasses     Prior Level of [place] per PT Eval: Pt lives with spouse in single story home. Pt typically independent with ADL and mobility. Pt occasionally uses a cane for long distances. Pt is retired and enjoys playing music. Patient is a 69 y/o man admitted s/p TKR. Pt with pre-operative plan for Residential at NH. PT recommending [address] services at discharge. Reina at [facility] confirmed pt accepted for [address] services at NH.  CPM arranged through [employer]. No other NH needs/concerns identified at this time. / to remain available for support and/or discharge planning.      Hermes Ross LCSW  Discharge Planner  [phone]

## 2023-06-07 NOTE — PLAN OF CARE
Patient A/O x4, VSS on RA, c/o mild pain. , tele, SCDs, hx JAGRUTI w/CPAP. Voiding via urinal, monitoring output d/t BPH, last BM 6/5. Plan for OT eval and dc home w/[facility] and CPM. Safety measures in place.

## 2023-06-08 NOTE — PROGRESS NOTES
Patient discharged home with wife. Prescriptions reviewed, discharge instructions reviewed. Pain controlled, vitals stable.

## 2024-12-18 RX ORDER — GINSENG 100 MG
1 CAPSULE ORAL DAILY
COMMUNITY

## 2025-02-06 NOTE — CONSULTS
[facility] Patient Status:  Observation    1960 MRN [id number]   [facility] 4NW-A Attending Lisa Jeffery MD   Hosp Day # 0 PCP Maddie Jj MD       Ambrose Tim is a 59year old male for N/V, All interventional equipment removed. [facility] Josafat Molina  6/23/10    Comment Performed by Briana Griffiths at [facility] Josafat Molina  6/23/10    Comment Performed by Briana Griffiths at [address] [place] [address] denies jaundice   LUNGS: denies SOB   CV: denies chest pain GI: denies dysphagia, N/V  : denies hematuria    MSK: has no joint pain    ENDOCR: denies diabetes or thyroid history   EXAM:   /102 mmHg  Pulse 92  Temp(Src) 98.2 °F (36.8 °C) (Oral)  Res

## 2025-02-11 ENCOUNTER — ANESTHESIA EVENT (OUTPATIENT)
Dept: SURGERY | Facility: HOSPITAL | Age: 65
End: 2025-02-11
Payer: MEDICARE

## 2025-02-11 NOTE — H&P
Urology Pre OP H&P  Encounter Date: 2/12/2025    Chief Complaint:   Adrenalectomy    History of Present Illness:   Leonel Lloyd is a 65 year old male who presents today for follow-up of an adrenal adenoma.     He has a 4cm left adrenal lesion. He sees. Dr. Phillips with endocrinology.    Lesion has been known since at least 2003.     Prior [place] en y gastric bypass. Lost 120bs and gained 50 back.     Dr. Phillips feels he has MACS (mild autonomous cortisol secretion) due to an abnormal 1mg and 8mg ODST.     We had previously reviewed treatment options of medical therapy (Dr. Phillips) versus proceeding with an adrenalectomy. He elected to proceed with an adrenalectomy.     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) 05/20/2011   Allergic rhinitis, cause unspecified   Anesthesia complication   high then low B/P after   Asthma   Back problem   SCOLIOSIS, AND REPAIRED HERNIATED DISC   BPH (benign prostatic hyperplasia)   Cancer (HCC)   basal cell skin   Complete tear of right rotator cuff 10/18/2018   10.2018 mri   COVID 2021   no sx   Diabetes (HCC)   Diverticulosis of large intestine   Elevated PSA   neg prostate biopsy 2012   Esophageal reflux   Extrinsic asthma, unspecified   ALLERGY INDUCED ASTHMA   High blood pressure   High cholesterol   History of MRSA infection   Insulin resistance 07/18/2018   Nystagmus   SINCE CHILDHOOD   OBESITY   Gastric bypass 2006   Obesity   JAGRUTI (obstructive sleep apnea) PSG 2/2/16 TX 4-916   AHI 15 Sao[facility] sridevi 80% CPAP 8 [employer]   Osteoarthritis   Postconcussion syndrome 03/25/2017   Rosacea   S/P lumbar microdiscectomy 02/14/2018   S/P reverse total shoulder arthroplasty, left 07/03/2019   S/P total knee arthroplasty, right 01/10/2020   Sleep apnea   Status post reverse total replacement of right shoulder 06/12/2019   Unspecified essential hypertension   normal coronary angiogam 2022   Visual impairment   glasses     Past Surgical History:    Procedure Laterality Date   COLONOSCOPY 08/14/2013   Procedure: COLONOSCOPY; Surgeon: Marshall Garner MD; Location:  ENDOSCOPY   COLONOSCOPY N/A 07/16/2015   Procedure: COLONOSCOPY; Surgeon: Marshall Garner MD; Location:  ENDOSCOPY   COLONOSCOPY N/A 07/03/2018   Procedure: COLONOSCOPY, POSSIBLE BIOPSY, POSSIBLE POLYPECTOMY 70166; Surgeon: Marshall Garner MD; Location: [facility]   COLONOSCOPY N/A 07/13/2021   Procedure: COLONOSCOPY; Surgeon: Tim Ramírez MD; Location:  ENDOSCOPY   COLONOSCOPY N/A 07/16/2024   Procedure: COLONOSCOPY, with polypectomy; Surgeon: Marshall Garner MD; Location: [facility]   DEBRIDE MASTOID CAVITY,SIMPLE   EAR CARTILAGE GRAFT TO FACE 01/11/2011   Performed by RERE POLLARD at [facility]   EXC SHOULDER LES SC > 3 CM Right 07/02/2014   Procedure: EXCISION OF ARM MASS; Surgeon: Ar Ortiz MD; Location: [facility]   EXCISION TURBINATE,SUBMUCOUS 06/23/2010   Performed by RIOS LIND at [facility]   EXCISION TURBINATE,SUBMUCOUS 06/23/2010   Performed by RIOS LIND at [facility]   GASTRIC BYPASS,OBESITY,SB RECONSTRUC 2007   KNEE REPLACEMENT SURGERY   MASTOIDECTOMY,SIMPLE   mastoid surgery   OTHER Left   shoulder   OTHER SURGICAL HISTORY 10/12/2012   Prostate Biopsy - Dr. Kovacs   OTHER SURGICAL HISTORY 10/30/2024   Cystoscopy-    RECONSTR TOTAL SHOULDER IMPLANT Bilateral   Right reverse shoulder arthroplasty   REMV CARTILAGE FOR GRAFT NASAL 01/11/2011   Performed by RERE POLLARD at [facility]   REPAIR NASAL STENOSIS 01/11/2011   Performed by RERE POLLARD at [facility]   REPAIR OF NASAL SEPTUM 06/23/2010   Performed by RIOS LIND at [facility]   SKIN SURGERY 09/05/2008   Scar to L shoulder s/p excision BCC 9/5/08   SPINE SURGERY PROCEDURE UNLISTED   lumbar   TOTAL KNEE ARTHROPLASTY Left 06/06/2023   TOTAL KNEE REPLACEMENT Right      Allergies:  Adhesive Tape; Betadine [Povidone Iodine]; Bethanidine; Povidone Iodine; Ragweed; Seasonal; and Tree, [place]m  Current Outpatient Medications   Medication Sig Dispense Refill   buPROPion  MG Oral Tablet 24 Hr Take 1 tablet (150 mg total) by mouth daily. 90 tablet 0   topiramate 50 MG Oral Tab Take 0.5 tablets (25 mg total) by mouth 2 (two) times daily. 90 tablet 0   metFORMIN 500 MG Oral Tab Take 3 tab daily with largest meal of the day or at bedtime 270 tablet 0   IPRATROPIUM 0.06 % Nasal Solution SPRAY 2 SPRAYS INTO EACH NOSTRIL 4 TIMES A DAY 3 each 1   allopurinol 100 MG Oral Tab TAKE 2 TABLETS BY MOUTH EVERY  tablet 3   rosuvastatin (CRESTOR) 20 MG Oral Tab Take 1 tablet (20 mg total) by mouth nightly. 30 tablet 11   aspirin 81 MG Oral Tab EC aspirin 81 mg tablet,delayed release, [RxNorm: 641306]   Ferrous Sulfate Dried (HIGH POTENCY IRON) 65 MG Oral Tab   montelukast 10 MG Oral Tab Take 1 tablet (10 mg total) by mouth daily. 90 tablet 3   tiZANidine 4 MG Oral Tab Take 1 tablet (4 mg total) by mouth every 8 (eight) hours as needed (muscle pain/spasm). 15 tablet 0   albuterol 108 (90 Base) MCG/ACT Inhalation Aero Soln Inhale 2 puffs into the lungs every 4 to 6 hours as needed for Wheezing. 20.1 each 0   EPINEPHrine (EPIPEN 2-PENNY) 0.3 MG/0.3ML Injection Solution Auto-injector Use as directed, for anaphylaxis, call 911 2 each 0   carvedilol 25 MG Oral Tab Take 25 mg by mouth 2 (two) times daily.   torsemide 20 MG Oral Tab Take 1.5 tablets (30 mg total) by mouth daily. 135 tablet 0   Respiratory Therapy Supplies (NEBULIZER/TUBING/MOUTHPIECE) Does not apply Kit Use as directed. Please supply with nebulizer, mouth piece and tubing 1 kit 0   losartan 100 MG Oral Tab Take 1 tablet (100 mg total) by mouth daily. 90 tablet 3   spironolactone 25 MG Oral Tab Take 1 tablet (25 mg total) by mouth daily. 90 tablet 3   clotrimazole-betamethasone 1-0.05 % External Cream Apply to AA BID for up to 2 weeks  45 g 3   diphenhydrAMINE HCl 25 MG Oral Cap Take 25 mg by mouth as needed for Allergies.   Azelaic Acid (FINACEA) 15 % External Gel Apply to face daily 50 g 3   Respiratory Therapy Supplies (NEBULIZER/TUBING/MOUTHPIECE) Does not apply Kit As directed 3 kit 1   Nebulizers (NEBULIZER COMPRESSOR) Does not apply Misc As directed, please provide all necessary supplies to include tubing, cup and mask. .90 ASTHMA 1 each 0     Social History:  He reports that he quit smoking about 26 years ago. His smoking use included cigarettes. He started smoking about 46 years ago. He has a 30 pack-year smoking history. He has never been exposed to tobacco smoke. He has never used smokeless tobacco. He reports current alcohol use. He reports that he does not use drugs.    Family History   Problem Relation Age of Onset   Cancer Mother   colon   Diabetes Mother   Hypertension Mother   Obesity Mother   Psychiatric Mother   depression   Hypertension Father   Prostate Cancer Father   Other (Other) Brother   suicide     Review of Systems:   General: Denies anorexia, weight loss, fevers, chills, night sweats, or other constitutional symptoms.   Neurologic: Denies headaches, stiff neck, photophobia, numbness, or weakness of extremities.   Psychiatric: Denies anxiety, depression.  Eyes: Denies vision changes.  Skin: Denies skin changes or rash.  Cardiac: Denies chest pain, palpitations, or orthopnea.  Pulmonary: Denies cough, hemoptysis, shortness of breath, or dyspnea on exertion.  GI: Denies abdominal pain, nausea, vomiting, or changes in bowel movements.  Musculoskeletal: Denies extremity pain, weakness.  : See HPI.    Physical Examination:   General: Awake, Alert, Oriented.  Well-nourished. Appears stated age.  Neurologic: No focal deficits. Normal gait.  HEENT: EOMI. No scleral icterus.  Cardiac: Regular rate and rhythm.  Respiratory: Non-labored respirations.  Abdomen: Soft, Non-tender, non-distended.  No palpable masses. No  costovertebral angle tenderness.  Extremities: Warm, well-perfused.  No palpable edema.  Skin: Normal-appearing. No rash or lesions.  Genitourinary: Deferred    Laboratory Review:   Component  Latest Ref Rng 8/16/2024 9/26/2024 10/4/2024   TOTAL VOLUME  mL 5200   Cortisol, Free, 24 Hour Urine  4.0 - 50.0 mcg/24 h 21.0   Creatinine, Urine  0.50 - 2.15 g/24 h 1.32   Cortisol  ug/dL 2.270   ACTH, PLASMA  6 - 50 pg/mL <5 (L)   DEXA  ng/dL >1000   CREATININE, 24 HOUR URINE  0.50 - 2.15 g/24 h 1.35   Cortisol, Lc/Ms, Saliva  mcg/dL 0.14     Radiology Review:   I have personally reviewed all pertinent imaging.    MRI Adrenals 6/10/2024:  Left adrenal mass present from at least 2007, slowly enlarging. Although does NOT have definitive characteristics of a lipid rich adenoma, extremely slow growth favors benign etiology.     Assessment:   In summary, Leonel Lloyd is a 64 year old male with an ~4cm left adrenal lesion with mild autonomous cortisol secretion (MACS).    Plan:   After obtaining a medical history, performing a physical exam, reviewing all available outside medical records, radiographs, and laboratory studies, I had a lengthy discussion with the patient regarding implications of a diagnosis of an adrenal lesion.    Specifically, we discussed that the vast majority (>85%) of incidentally discovered adrenal lesions prove to be benign adenomas. Indeed, adrenocortical carcinomas (ACC) are exceedingly rare with an incidence of approximately 1 per million, or approximately 300 cases in the [place] per year. Characterization of adrenal masses generally pivots on 3 factors: (1) size, (2) imaging characteristics, and (3) growth kinetics.    The median diameter of incidentally discovered adrenal lesions is 3cm. Lesions >6cm are usually resected regardless of imaging characteristics, since >30% will prove to be malignant. Definitive diagnosis of asymptomatic myelolipoma - denoted by radiographic presence of  macroscopic fat - is the one general exception to this rule. Incidental adrenal lesions <4cm require thorough metabolic evaluation and follow-up imaging; however, routine resection is unnecessary. Lesions between 4 and 6 cm in size are malignant in approximately 6% of cases, and most experts suggest resection in individuals who are at an acceptable risk for surgery. This 4 cm threshold has been reported to afford a 93% sensitivity and 42% specificity for diagnosis of malignant adrenal lesions.     We briefly discussed the nuances of modern adrenal imaging. In addition to assessment of size, homogeneity, and contours, imaging affords determination of lesion density. Adenomas are differentiated from other lesions by assessment of intra-cytoplasmic lipid content. Non-contrast computed tomography (CT) affords nearly 100% specificity for the diagnosis of adrenal adenomas. Indeed essentially all lesions that exhibit attenuation of <10 Hounsfield units on unenhanced CT are adenomas. The sensitivity of this 10 HU radiographic cutoff, however, is imperfect, and some 30% of adrenal adenomas will register attenuation above this level. Such lesions are deemed indeterminate; however, the majority of these lipid-poor adenomas that exhibit a density above 10HU can still be differentiated from other adrenal pathology using the so-called \"washout\" imaging technique. Lesions that lose more than 40-60% of gained enhancement on delayed contrast-enhanced CT imaging (i.e. those that \"washout\") should be managed as adenomas, since the specificity of this technique approaches 100%. Magnetic resonance imaging also can be useful for characterizing adrenal neoplasms. Using opposed phase chemical-shift strategies, intracellular fat content of the adrenal lesions can be gauged. Loss of signal intensity on out-of-phase sequences when compared to in-phase images demonstrates abundance of cytoplasmic lipid and identifies the lesion as an adenoma.  Although MR chemical-shift imaging is arguably superior to unenhanced CT in characterizing indeterminate lesions, CT washout studies are the gold standard imaging technique in characterizing lipid-poor adenomas. Indeed, MRI-based washout techniques are not clinically useful, since gadolinium contrast agents do not possess the dose-dependent signal intensity properties that are inherent in iodinated contrast agents (i.e. gadolinium contrast agents do not \"washout\").    Malignant transformation of adrenal incidentalomas has been estimated at 1 in 1000, and current recommendations suggest that generally, adrenal lesions that are not resected should be reimaged. Approximately 5% to 9% of adrenal masses grow at least 1 cm in diameter upon interval follow-up, and such growth has been suggested as a trigger for resection. Nevertheless, chances of uncovering malignant pathology in such instances are extremely low.  We discussed that a metabolic evaluation is necessary for all adrenal incidentalomas, since over 10% are metabolically active. Hypersecretion of cortisol and catecholamines should be evaluated in all-comers, while aldosterone hypersecretion only needs to be ruled out in those with a history of hypertension.     In cases when resection is necessary, the risks of the surgery were discussed in detail including medical and anesthetic complications (e.g. heart attack, stroke, deep vein thrombosis, pulmonary embolism, infection (pneumonia, etc.), bleeding with possible need for transfusion, adjacent organ involvement or injury (spleen, pancreas, great vessels, kidney, colon, small bowel), wound complications, or reaction to medications). I explained that for patients with left-sided adrenal tumors risks of splenectomy were remote but finite. We reviewed the risks of adrenal insufficiency. The expected hospital and post-operative courses were reviewed. We reviewed what to expect with regard to incisions, post-operative  pain, and the risks of subsequent incisional hernia. I explained that during open surgery a portion of a rib may be resected. I communicated that there is always a small chance of open conversion when minimally invasive approaches are employed. For partial adrenalectomy, I communicated the risks of having to remove the entire adrenal gland.     I communicated that the decision process regarding adrenal surgery can be complex. I invited the patient to become an active participant in their care and to become proactive in learning about adrenal mass and the risks and benefits of treatment. I explained that I am always willing to answer any questions that may arise following the office visit and encouraged the patient to call my office with any questions during this anxiety-provoking period.    We reviewed the option of an adrenalectomy as a treatment for his left adrenal adenoma with MACS. I reviewed the surgery in detail, as well as the expected recovery course. He has elected to proceed.    I have discussed the risks, benefits and alternatives to the proposed procedure/treatment plan with the patient.  Our discussion also included the risks and benefits of the alternatives treatment options, including doing nothing. They were encouraged to ask questions and all questions were answered to their satisfaction.  At the end of our discussion they gave their verbal consent to the proposed procedure/treatment plan.    Cornell Richards MD  [facility]  Office: [phone]

## 2025-02-12 ENCOUNTER — HOSPITAL ENCOUNTER (INPATIENT)
Facility: HOSPITAL | Age: 65
LOS: 2 days | Discharge: HOME | DRG: 614 | End: 2025-02-14
Attending: UROLOGY | Admitting: UROLOGY
Payer: MEDICARE

## 2025-02-12 ENCOUNTER — HOSPITAL ENCOUNTER (INPATIENT)
Facility: HOSPITAL | Age: 65
LOS: 2 days | Discharge: HOME | End: 2025-02-14
Attending: UROLOGY | Admitting: UROLOGY
Payer: MEDICARE

## 2025-02-12 ENCOUNTER — ANESTHESIA (OUTPATIENT)
Dept: SURGERY | Facility: HOSPITAL | Age: 65
End: 2025-02-12
Payer: MEDICARE

## 2025-02-12 DIAGNOSIS — E27.8 LEFT ADRENAL MASS (HCC): Primary | ICD-10-CM

## 2025-02-12 LAB
ANION GAP SERPL CALC-SCNC: 9 MMOL/L (ref 0–18)
ANTIBODY SCREEN: NEGATIVE
BUN BLD-MCNC: 28 MG/DL (ref 9–23)
CALCIUM BLD-MCNC: 8.7 MG/DL (ref 8.7–10.6)
CHLORIDE SERPL-SCNC: 109 MMOL/L (ref 98–112)
CO2 SERPL-SCNC: 21 MMOL/L (ref 21–32)
CREAT BLD-MCNC: 1.41 MG/DL
EGFRCR SERPLBLD CKD-EPI 2021: 55 ML/MIN/1.73M2 (ref 60–?)
ERYTHROCYTE [DISTWIDTH] IN BLOOD BY AUTOMATED COUNT: 13.7 %
GLUCOSE BLD-MCNC: 101 MG/DL (ref 70–99)
GLUCOSE BLD-MCNC: 125 MG/DL (ref 70–99)
GLUCOSE BLD-MCNC: 168 MG/DL (ref 70–99)
GLUCOSE BLD-MCNC: 207 MG/DL (ref 70–99)
HCT VFR BLD AUTO: 42 %
HGB BLD-MCNC: 14 G/DL
MCH RBC QN AUTO: 31.4 PG (ref 26–34)
MCHC RBC AUTO-ENTMCNC: 33.3 G/DL (ref 31–37)
MCV RBC AUTO: 94.2 FL
OSMOLALITY SERPL CALC.SUM OF ELEC: 295 MOSM/KG (ref 275–295)
PLATELET # BLD AUTO: 151 10(3)UL (ref 150–450)
POTASSIUM SERPL-SCNC: 4.8 MMOL/L (ref 3.5–5.1)
RBC # BLD AUTO: 4.46 X10(6)UL
RH BLOOD TYPE: POSITIVE
SODIUM SERPL-SCNC: 139 MMOL/L (ref 136–145)
WBC # BLD AUTO: 8.7 X10(3) UL (ref 4–11)

## 2025-02-12 PROCEDURE — 86850 RBC ANTIBODY SCREEN: CPT | Performed by: UROLOGY

## 2025-02-12 PROCEDURE — 8E0W4CZ ROBOTIC ASSISTED PROCEDURE OF TRUNK REGION, PERCUTANEOUS ENDOSCOPIC APPROACH: ICD-10-PCS | Performed by: UROLOGY

## 2025-02-12 PROCEDURE — 85027 COMPLETE CBC AUTOMATED: CPT | Performed by: UROLOGY

## 2025-02-12 PROCEDURE — 94660 CPAP INITIATION&MGMT: CPT

## 2025-02-12 PROCEDURE — 86900 BLOOD TYPING SEROLOGIC ABO: CPT | Performed by: UROLOGY

## 2025-02-12 PROCEDURE — 80048 BASIC METABOLIC PNL TOTAL CA: CPT | Performed by: UROLOGY

## 2025-02-12 PROCEDURE — 88307 TISSUE EXAM BY PATHOLOGIST: CPT | Performed by: UROLOGY

## 2025-02-12 PROCEDURE — 88341 IMHCHEM/IMCYTCHM EA ADD ANTB: CPT | Performed by: UROLOGY

## 2025-02-12 PROCEDURE — 88342 IMHCHEM/IMCYTCHM 1ST ANTB: CPT | Performed by: UROLOGY

## 2025-02-12 PROCEDURE — 76942 ECHO GUIDE FOR BIOPSY: CPT | Performed by: ANESTHESIOLOGY

## 2025-02-12 PROCEDURE — 5A09357 ASSISTANCE WITH RESPIRATORY VENTILATION, LESS THAN 24 CONSECUTIVE HOURS, CONTINUOUS POSITIVE AIRWAY PRESSURE: ICD-10-PCS | Performed by: UROLOGY

## 2025-02-12 PROCEDURE — 82962 GLUCOSE BLOOD TEST: CPT

## 2025-02-12 PROCEDURE — 88321 CONSLTJ&REPRT SLD PREP ELSWR: CPT | Performed by: UROLOGY

## 2025-02-12 PROCEDURE — 0GT24ZZ RESECTION OF LEFT ADRENAL GLAND, PERCUTANEOUS ENDOSCOPIC APPROACH: ICD-10-PCS | Performed by: UROLOGY

## 2025-02-12 PROCEDURE — 86901 BLOOD TYPING SEROLOGIC RH(D): CPT | Performed by: UROLOGY

## 2025-02-12 RX ORDER — HYDROMORPHONE HYDROCHLORIDE 1 MG/ML
0.4 INJECTION, SOLUTION INTRAMUSCULAR; INTRAVENOUS; SUBCUTANEOUS EVERY 5 MIN PRN
Status: DISCONTINUED | OUTPATIENT
Start: 2025-02-12 | End: 2025-02-12 | Stop reason: HOSPADM

## 2025-02-12 RX ORDER — BUPROPION HYDROCHLORIDE 150 MG/1
150 TABLET ORAL DAILY
Status: DISCONTINUED | OUTPATIENT
Start: 2025-02-13 | End: 2025-02-14

## 2025-02-12 RX ORDER — ONDANSETRON 2 MG/ML
4 INJECTION INTRAMUSCULAR; INTRAVENOUS EVERY 6 HOURS PRN
Status: DISCONTINUED | OUTPATIENT
Start: 2025-02-12 | End: 2025-02-12 | Stop reason: HOSPADM

## 2025-02-12 RX ORDER — TOPIRAMATE 25 MG/1
25 TABLET, FILM COATED ORAL 2 TIMES DAILY
Status: DISCONTINUED | OUTPATIENT
Start: 2025-02-12 | End: 2025-02-14

## 2025-02-12 RX ORDER — BUPIVACAINE HYDROCHLORIDE 2.5 MG/ML
INJECTION, SOLUTION EPIDURAL; INFILTRATION; INTRACAUDAL AS NEEDED
Status: DISCONTINUED | OUTPATIENT
Start: 2025-02-12 | End: 2025-02-12 | Stop reason: HOSPADM

## 2025-02-12 RX ORDER — LOSARTAN POTASSIUM 100 MG/1
100 TABLET ORAL DAILY
Status: DISCONTINUED | OUTPATIENT
Start: 2025-02-12 | End: 2025-02-13

## 2025-02-12 RX ORDER — TORSEMIDE 20 MG/1
20 TABLET ORAL DAILY
COMMUNITY
End: 2025-02-14

## 2025-02-12 RX ORDER — ALBUTEROL SULFATE 90 UG/1
1 INHALANT RESPIRATORY (INHALATION) EVERY 6 HOURS PRN
Status: DISCONTINUED | OUTPATIENT
Start: 2025-02-12 | End: 2025-02-14

## 2025-02-12 RX ORDER — METOCLOPRAMIDE HYDROCHLORIDE 5 MG/ML
10 INJECTION INTRAMUSCULAR; INTRAVENOUS EVERY 8 HOURS PRN
Status: DISCONTINUED | OUTPATIENT
Start: 2025-02-12 | End: 2025-02-12 | Stop reason: HOSPADM

## 2025-02-12 RX ORDER — CARVEDILOL 12.5 MG/1
25 TABLET ORAL 2 TIMES DAILY
Status: DISCONTINUED | OUTPATIENT
Start: 2025-02-12 | End: 2025-02-13

## 2025-02-12 RX ORDER — NALOXONE HYDROCHLORIDE 0.4 MG/ML
0.08 INJECTION, SOLUTION INTRAMUSCULAR; INTRAVENOUS; SUBCUTANEOUS AS NEEDED
Status: DISCONTINUED | OUTPATIENT
Start: 2025-02-12 | End: 2025-02-12 | Stop reason: HOSPADM

## 2025-02-12 RX ORDER — MIDAZOLAM HYDROCHLORIDE 1 MG/ML
1 INJECTION INTRAMUSCULAR; INTRAVENOUS EVERY 5 MIN PRN
Status: DISCONTINUED | OUTPATIENT
Start: 2025-02-12 | End: 2025-02-12 | Stop reason: HOSPADM

## 2025-02-12 RX ORDER — HYDROMORPHONE HYDROCHLORIDE 1 MG/ML
INJECTION, SOLUTION INTRAMUSCULAR; INTRAVENOUS; SUBCUTANEOUS
Status: DISCONTINUED
Start: 2025-02-12 | End: 2025-02-12 | Stop reason: WASHOUT

## 2025-02-12 RX ORDER — ONDANSETRON 2 MG/ML
4 INJECTION INTRAMUSCULAR; INTRAVENOUS EVERY 6 HOURS PRN
Status: DISCONTINUED | OUTPATIENT
Start: 2025-02-12 | End: 2025-02-14

## 2025-02-12 RX ORDER — HYDROMORPHONE HYDROCHLORIDE 1 MG/ML
0.6 INJECTION, SOLUTION INTRAMUSCULAR; INTRAVENOUS; SUBCUTANEOUS EVERY 5 MIN PRN
Status: DISCONTINUED | OUTPATIENT
Start: 2025-02-12 | End: 2025-02-12 | Stop reason: HOSPADM

## 2025-02-12 RX ORDER — METOCLOPRAMIDE HYDROCHLORIDE 5 MG/ML
10 INJECTION INTRAMUSCULAR; INTRAVENOUS EVERY 8 HOURS PRN
Status: DISCONTINUED | OUTPATIENT
Start: 2025-02-12 | End: 2025-02-14

## 2025-02-12 RX ORDER — ROSUVASTATIN CALCIUM 20 MG/1
10 TABLET, COATED ORAL NIGHTLY
COMMUNITY

## 2025-02-12 RX ORDER — POLYETHYLENE GLYCOL 3350 17 G/17G
17 POWDER, FOR SOLUTION ORAL DAILY PRN
Status: DISCONTINUED | OUTPATIENT
Start: 2025-02-12 | End: 2025-02-14

## 2025-02-12 RX ORDER — ACETAMINOPHEN 500 MG
1000 TABLET ORAL ONCE
Status: DISCONTINUED | OUTPATIENT
Start: 2025-02-12 | End: 2025-02-12 | Stop reason: HOSPADM

## 2025-02-12 RX ORDER — CEFAZOLIN SODIUM IN 0.9 % NACL 3 G/100 ML
3 INTRAVENOUS SOLUTION, PIGGYBACK (ML) INTRAVENOUS ONCE
Status: COMPLETED | OUTPATIENT
Start: 2025-02-12 | End: 2025-02-12

## 2025-02-12 RX ORDER — LIDOCAINE HYDROCHLORIDE ANHYDROUS AND DEXTROSE MONOHYDRATE .8; 5 G/100ML; G/100ML
INJECTION, SOLUTION INTRAVENOUS CONTINUOUS PRN
Status: DISCONTINUED | OUTPATIENT
Start: 2025-02-12 | End: 2025-02-12 | Stop reason: SURG

## 2025-02-12 RX ORDER — HYDROMORPHONE HYDROCHLORIDE 1 MG/ML
0.2 INJECTION, SOLUTION INTRAMUSCULAR; INTRAVENOUS; SUBCUTANEOUS EVERY 5 MIN PRN
Status: DISCONTINUED | OUTPATIENT
Start: 2025-02-12 | End: 2025-02-12 | Stop reason: HOSPADM

## 2025-02-12 RX ORDER — TIZANIDINE 2 MG/1
4 TABLET ORAL EVERY 8 HOURS PRN
Status: DISCONTINUED | OUTPATIENT
Start: 2025-02-12 | End: 2025-02-14

## 2025-02-12 RX ORDER — DEXAMETHASONE SODIUM PHOSPHATE 4 MG/ML
VIAL (ML) INJECTION AS NEEDED
Status: DISCONTINUED | OUTPATIENT
Start: 2025-02-12 | End: 2025-02-12 | Stop reason: SURG

## 2025-02-12 RX ORDER — ALBUTEROL SULFATE 0.83 MG/ML
2.5 SOLUTION RESPIRATORY (INHALATION) EVERY 4 HOURS PRN
Status: DISCONTINUED | OUTPATIENT
Start: 2025-02-12 | End: 2025-02-12

## 2025-02-12 RX ORDER — ROSUVASTATIN CALCIUM 20 MG/1
20 TABLET, COATED ORAL NIGHTLY
Status: DISCONTINUED | OUTPATIENT
Start: 2025-02-12 | End: 2025-02-14

## 2025-02-12 RX ORDER — ACETAMINOPHEN 500 MG
1000 TABLET ORAL ONCE AS NEEDED
Status: DISCONTINUED | OUTPATIENT
Start: 2025-02-12 | End: 2025-02-12 | Stop reason: HOSPADM

## 2025-02-12 RX ORDER — DOCUSATE SODIUM 100 MG/1
100 CAPSULE, LIQUID FILLED ORAL 2 TIMES DAILY
Status: DISCONTINUED | OUTPATIENT
Start: 2025-02-12 | End: 2025-02-14

## 2025-02-12 RX ORDER — HYDROCODONE BITARTRATE AND ACETAMINOPHEN 5; 325 MG/1; MG/1
2 TABLET ORAL ONCE AS NEEDED
Status: DISCONTINUED | OUTPATIENT
Start: 2025-02-12 | End: 2025-02-12 | Stop reason: HOSPADM

## 2025-02-12 RX ORDER — SPIRONOLACTONE 25 MG/1
25 TABLET ORAL DAILY
Status: DISCONTINUED | OUTPATIENT
Start: 2025-02-13 | End: 2025-02-13

## 2025-02-12 RX ORDER — MONTELUKAST SODIUM 10 MG/1
10 TABLET ORAL DAILY
Status: DISCONTINUED | OUTPATIENT
Start: 2025-02-13 | End: 2025-02-14

## 2025-02-12 RX ORDER — ONDANSETRON 2 MG/ML
INJECTION INTRAMUSCULAR; INTRAVENOUS AS NEEDED
Status: DISCONTINUED | OUTPATIENT
Start: 2025-02-12 | End: 2025-02-12 | Stop reason: SURG

## 2025-02-12 RX ORDER — MEPERIDINE HYDROCHLORIDE 25 MG/ML
12.5 INJECTION INTRAMUSCULAR; INTRAVENOUS; SUBCUTANEOUS AS NEEDED
Status: DISCONTINUED | OUTPATIENT
Start: 2025-02-12 | End: 2025-02-12 | Stop reason: HOSPADM

## 2025-02-12 RX ORDER — SODIUM CHLORIDE, SODIUM LACTATE, POTASSIUM CHLORIDE, CALCIUM CHLORIDE 600; 310; 30; 20 MG/100ML; MG/100ML; MG/100ML; MG/100ML
INJECTION, SOLUTION INTRAVENOUS CONTINUOUS
Status: DISCONTINUED | OUTPATIENT
Start: 2025-02-12 | End: 2025-02-12 | Stop reason: HOSPADM

## 2025-02-12 RX ORDER — BUPROPION HYDROCHLORIDE 150 MG/1
150 TABLET ORAL DAILY
COMMUNITY

## 2025-02-12 RX ORDER — HYDROCODONE BITARTRATE AND ACETAMINOPHEN 5; 325 MG/1; MG/1
1 TABLET ORAL ONCE AS NEEDED
Status: DISCONTINUED | OUTPATIENT
Start: 2025-02-12 | End: 2025-02-12 | Stop reason: HOSPADM

## 2025-02-12 RX ORDER — HEPARIN SODIUM 5000 [USP'U]/ML
5000 INJECTION, SOLUTION INTRAVENOUS; SUBCUTANEOUS ONCE
Status: COMPLETED | OUTPATIENT
Start: 2025-02-12 | End: 2025-02-12

## 2025-02-12 RX ORDER — SODIUM CHLORIDE 9 MG/ML
INJECTION, SOLUTION INTRAVENOUS CONTINUOUS
Status: DISCONTINUED | OUTPATIENT
Start: 2025-02-12 | End: 2025-02-14

## 2025-02-12 RX ORDER — FERROUS SULFATE 325(65) MG
325 TABLET, DELAYED RELEASE (ENTERIC COATED) ORAL
COMMUNITY

## 2025-02-12 RX ORDER — HYDROMORPHONE HYDROCHLORIDE 1 MG/ML
0.4 INJECTION, SOLUTION INTRAMUSCULAR; INTRAVENOUS; SUBCUTANEOUS EVERY 2 HOUR PRN
Status: DISCONTINUED | OUTPATIENT
Start: 2025-02-12 | End: 2025-02-14

## 2025-02-12 RX ORDER — ENOXAPARIN SODIUM 100 MG/ML
40 INJECTION SUBCUTANEOUS DAILY
Status: DISCONTINUED | OUTPATIENT
Start: 2025-02-13 | End: 2025-02-13

## 2025-02-12 RX ORDER — HYDROMORPHONE HYDROCHLORIDE 1 MG/ML
0.2 INJECTION, SOLUTION INTRAMUSCULAR; INTRAVENOUS; SUBCUTANEOUS EVERY 2 HOUR PRN
Status: DISCONTINUED | OUTPATIENT
Start: 2025-02-12 | End: 2025-02-14

## 2025-02-12 RX ORDER — OXYCODONE HYDROCHLORIDE 5 MG/1
5 TABLET ORAL EVERY 4 HOURS PRN
Status: DISCONTINUED | OUTPATIENT
Start: 2025-02-12 | End: 2025-02-14

## 2025-02-12 RX ORDER — SENNOSIDES 8.6 MG
17.2 TABLET ORAL NIGHTLY
Status: DISCONTINUED | OUTPATIENT
Start: 2025-02-12 | End: 2025-02-14

## 2025-02-12 RX ORDER — TORSEMIDE 20 MG/1
20 TABLET ORAL DAILY
Status: DISCONTINUED | OUTPATIENT
Start: 2025-02-12 | End: 2025-02-13

## 2025-02-12 RX ORDER — SODIUM CHLORIDE, SODIUM LACTATE, POTASSIUM CHLORIDE, CALCIUM CHLORIDE 600; 310; 30; 20 MG/100ML; MG/100ML; MG/100ML; MG/100ML
INJECTION, SOLUTION INTRAVENOUS CONTINUOUS
Status: DISCONTINUED | OUTPATIENT
Start: 2025-02-12 | End: 2025-02-12

## 2025-02-12 RX ORDER — METOCLOPRAMIDE HYDROCHLORIDE 5 MG/ML
INJECTION INTRAMUSCULAR; INTRAVENOUS AS NEEDED
Status: DISCONTINUED | OUTPATIENT
Start: 2025-02-12 | End: 2025-02-12 | Stop reason: SURG

## 2025-02-12 RX ORDER — ALLOPURINOL 100 MG/1
200 TABLET ORAL DAILY
Status: DISCONTINUED | OUTPATIENT
Start: 2025-02-13 | End: 2025-02-14

## 2025-02-12 RX ORDER — ROCURONIUM BROMIDE 10 MG/ML
INJECTION, SOLUTION INTRAVENOUS AS NEEDED
Status: DISCONTINUED | OUTPATIENT
Start: 2025-02-12 | End: 2025-02-12 | Stop reason: SURG

## 2025-02-12 RX ADMIN — ONDANSETRON 4 MG: 2 INJECTION INTRAMUSCULAR; INTRAVENOUS at 10:29:00

## 2025-02-12 RX ADMIN — CEFAZOLIN SODIUM IN 0.9 % NACL 3 G: 3 G/100 ML INTRAVENOUS SOLUTION, PIGGYBACK (ML) INTRAVENOUS at 08:25:00

## 2025-02-12 RX ADMIN — ROCURONIUM BROMIDE 20 MG: 10 INJECTION, SOLUTION INTRAVENOUS at 09:25:00

## 2025-02-12 RX ADMIN — ROCURONIUM BROMIDE 30 MG: 10 INJECTION, SOLUTION INTRAVENOUS at 08:52:00

## 2025-02-12 RX ADMIN — DEXAMETHASONE SODIUM PHOSPHATE 8 MG: 4 MG/ML VIAL (ML) INJECTION at 10:28:00

## 2025-02-12 RX ADMIN — METOCLOPRAMIDE HYDROCHLORIDE 10 MG: 5 INJECTION INTRAMUSCULAR; INTRAVENOUS at 10:28:00

## 2025-02-12 RX ADMIN — SODIUM CHLORIDE, SODIUM LACTATE, POTASSIUM CHLORIDE, CALCIUM CHLORIDE: 600; 310; 30; 20 INJECTION, SOLUTION INTRAVENOUS at 11:18:00

## 2025-02-12 RX ADMIN — ROCURONIUM BROMIDE 20 MG: 10 INJECTION, SOLUTION INTRAVENOUS at 10:12:00

## 2025-02-12 RX ADMIN — LIDOCAINE HYDROCHLORIDE ANHYDROUS AND DEXTROSE MONOHYDRATE 2 MG/KG/HR: .8; 5 INJECTION, SOLUTION INTRAVENOUS at 08:11:00

## 2025-02-12 RX ADMIN — ROCURONIUM BROMIDE 50 MG: 10 INJECTION, SOLUTION INTRAVENOUS at 08:11:00

## 2025-02-12 NOTE — ANESTHESIA PROCEDURE NOTES
Arterial Line    Date/Time: 2/12/2025 8:20 AM    Performed by: Haroon Bass MD  Authorized by: Haroon Bass MD    General Information and Staff    Procedure Start:  2/12/2025 8:20 AM  Procedure End:  2/12/2025 8:25 AM  Anesthesiologist:  Haroon Bass MD  Performed By:  Anesthesiologist  Patient Location:  OR  Indication: continuous blood pressure monitoring and blood sampling needed    Site Identification: surface landmarks    Preanesthetic Checklist: 2 patient identifiers, IV checked, risks and benefits discussed, monitors and equipment checked, pre-op evaluation, timeout performed, anesthesia consent and sterile technique used    Procedure Details    Catheter Size:  20 G  Catheter Length:  1 and 3/4 inch  Catheter Type:  Arrow  Seldinger Technique?: Yes    Laterality:  Right  Site:  Radial artery  Site Prep: chlorhexidine    Line Secured:  Tape and Tegaderm    Assessment    Events: patient tolerated procedure well with no complications      Medications  2/12/2025 8:20 AM      Additional Comments

## 2025-02-12 NOTE — ANESTHESIA PROCEDURE NOTES
Airway  Date/Time: 2/12/2025 8:14 AM  Urgency: elective    Airway not difficult    General Information and Staff    Patient location during procedure: OR  Anesthesiologist: Haroon Bass MD  Performed: anesthesiologist   Performed by: Haroon Bass MD  Authorized by: Haroon Bass MD      Indications and Patient Condition  Indications for airway management: anesthesia  Sedation level: deep  Preoxygenated: yes  Patient position: sniffing  Mask difficulty assessment: 2 - vent by mask + OA or adjuvant +/- NMBA    Final Airway Details  Final airway type: endotracheal airway      Successful airway: ETT  Cuffed: yes   Successful intubation technique: direct laryngoscopy  Endotracheal tube insertion site: oral  Blade: GlideScope  Blade size: #4  ETT size (mm): 8.0    Cormack-Lehane Classification: grade I - full view of glottis  Placement verified by: capnometry   Measured from: lips  ETT to lips (cm): 23  Number of attempts at approach: 1    Additional Comments  Pt positioned with blankets under back to optimize intubation. Pt was an easy mask with an oral airway. Video laryngoscope with rigid stylet made for easy first pass intubation.

## 2025-02-12 NOTE — PROGRESS NOTES
NURSING ADMISSION NOTE      Patient admitted via Cart  Oriented to room.  Safety precautions initiated.  Bed in low position.  Call light in reach.    Skin check completed with Alexa PCT. X 5 lap sites with skin glue. Up to chair. Reviewed plan of care with patient.

## 2025-02-12 NOTE — CONSULTS
[facility]   part of [facility]    Medical Consult     Leonel Lloyd Patient Status:  Inpatient    1960 MRN [id number]   Location [facility] 3NW-A Attending Cornell Richards MD   Hosp Day # 0 PCP Judson Leon MD     Chief Complaint: 3.9 cm left adrenal adenoma with MAC S    History of Present Illness: Leonel Lloyd is a 65 year old male with history of allergic rhinitis, asthma, BPH, skin cancer, GERD, hypertension, obstructive sleep apnea presenting with 3.9 cm left adrenal adenoma with MAC S status post robotic assisted laparoscopic left adrenalectomy.  Patient denies any preoperative positive review of systems.  Patient denies any acute issues currently.  Patient's pain is controlled.    Past Medical History:  Past Medical History:    Allergic rhinitis, cause unspecified    Anesthesia complication    high then low B/P after    Asthma (HCC)    Back problem    SCOLIOSIS, AND REPAIRED HERNIATED DISC    BPH (benign prostatic hyperplasia)    Cancer (HCC)    basal cell skin    Complete tear of right rotator cuff    10.2018 mri    COVID    head cold,lost sense o taste 1 day, fatigue. no hospitalization    COVID-19    cough, body aches, loss of taste    Difficult intubation    difficult airway per Dr. Mann--see Airway audit link    Difficult intubation    difficult airway per Dr. Box--see Airway audit link    Diverticulosis of large intestine    Elevated PSA    neg prostate biopsy     Esophageal reflux    Extrinsic asthma, unspecified    ALLERGY INDUCED  ASTHMA    High blood pressure    History of stomach ulcers    Insulin resistance    Nystagmus    SINCE CHILDHOOD    OBESITY    Gastric bypass     JAGRUTI (obstructive sleep apnea)    AHI 15 Sao2 sridevi 80% CPAP 8 [employer]    Osteoarthritis    Postconcussion syndrome    Rosacea    Sleep apnea    cpap    Unspecified essential hypertension    Visual impairment    glasses        Past Surgical History:   Past Surgical History:    Procedure Laterality Date    Arthroscopy of joint unlisted Bilateral     SHOULDER REPLACEMENT    Colonoscopy  08/14/2013    Procedure: COLONOSCOPY;  Surgeon: Marshall Garner MD;  Location:  ENDOSCOPY    Colonoscopy N/A 07/16/2015    Procedure: COLONOSCOPY;  Surgeon: Marshall Garner MD;  Location:  ENDOSCOPY    Colonoscopy N/A 07/03/2018    Procedure: COLONOSCOPY, POSSIBLE BIOPSY, POSSIBLE POLYPECTOMY 43184;  Surgeon: Marshall Garner MD;  Location: [facility]    Colonoscopy N/A 07/13/2021    Procedure: COLONOSCOPY;  Surgeon: Tim Ramírez MD;  Location:  ENDOSCOPY    Debride mastoid cavity,simple      Ear cartilage graft to face  01/11/2011    Performed by RERE POLLARD at [facility]    Exc shoulder les sc > 3 cm Right 07/02/2014    Procedure: EXCISION OF ARM MASS;  Surgeon: Ar Ortiz MD;  Location: [facility]    Excision turbinate,submucous  06/23/2010    Performed by RIOS LIND at [facility]    Excision turbinate,submucous  06/23/2010    Performed by RIOS LIND at [facility]    Gastric bypass,obesity,sb reconstruc  2007    Mastoidectomy,simple      mastoid surgery    Other Left     shoulder    Other surgical history  10/12/2012    Prostate Biopsy - Dr. Kovacs     Reconstr total shoulder implant Bilateral     Right reverse shoulder arthroplasty     Remv cartilage for graft nasal  01/11/2011    Performed by RERE POLLARD at [facility]    Repair nasal stenosis  01/11/2011    Performed by RERE POLLARD at [facility]    Repair of nasal septum  06/23/2010    Performed by RIOS LIND at [facility]    Skin surgery  09/05/2008    Scar to L shoulder s/p excision BCC 9/5/08    Spine surgery procedure unlisted      lumbar    Total knee replacement Bilateral        Social History:  reports that he quit smoking about 26 years ago. His smoking use included cigarettes. He started smoking about 46  years ago. He has a 30 pack-year smoking history. He has never used smokeless tobacco. He reports current alcohol use. He reports that he does not use drugs.No illegal drugs, , 2 children, no cane or walker    Family History:   Family History   Problem Relation Age of Onset    Hypertension Father     Cancer Mother         colon    Diabetes Mother     Hypertension Mother     Obesity Mother     Psychiatric Mother         depression    Other (Other) Brother         suicide   Mother passed  Father passed    Allergies: Allergies[1]    Medications:  Medications Ordered Prior to Encounter[2]    Review of Systems:   A comprehensive 14 point review of systems was completed.    Pertinent positives and negatives noted in the HPI.    Physical Exam:    /78 (BP Location: Right arm)   Pulse 60   Temp 97.3 °F (36.3 °C) (Temporal)   Resp 16   Ht 5' 10.5\" (1.791 m)   Wt 291 lb (132 kg)   SpO2 99%   BMI 41.16 kg/m²   General: No acute distress. Alert and oriented   HEENT: Normocephalic atraumatic. Moist mucous membranes. EOM-I.   Neck: No JVD. No carotid bruits.  Respiratory: Clear to auscultation bilaterally. No wheezes. No crackles  Cardiovascular: S1, S2. Regular rate and rhythm. No murmurs  Chest and Back: No tenderness or deformity.  Abdomen: Soft, post operative tenderness, nondistended.  Positive bowel sounds. No rebound  Neurologic: No focal neurological deficits. CNII-XII grossly intact. Sensation and strength intact  Musculoskeletal: Moves all extremities.  Extremities: No edema or tenderness of the LE  Integument: No new rashes or lesions.   Psychiatric: Appropriate mood and affect.      Diagnostic Data:      Labs:  Recent Labs   Lab 02/12/25  1154   WBC 8.7   HGB 14.0   MCV 94.2   .0       Recent Labs   Lab 02/12/25  1154   *   BUN 28*   CREATSERUM 1.41*   CA 8.7      K 4.8      CO2 21.0       Estimated Creatinine Clearance: 53.4 mL/min (A) (based on SCr of 1.41 mg/dL  (H)).    No results for input(s): \"PTP\", \"INR\" in the last 168 hours.    No results for input(s): \"TROP\", \"CK\" in the last 168 hours.    Imaging: Imaging data reviewed in Epic.      ASSESSMENT / PLAN:   65 year old male with history of allergic rhinitis, asthma, BPH, skin cancer, GERD, hypertension, obstructive sleep apnea presenting with 3.9 cm left adrenal adenoma with MAC S status post robotic assisted laparoscopic left adrenalectomy.    3.9 cm left adrenal adenoma with MACS  -POD # 0 status post robotic assisted laparoscopic left adrenalectomy.  -urology following    Post operative Pain  -hydromorphone iv prn  -oxy po prn    HTN  -sbp stable  -carvedilol  -losartan    HLD  -rosuvastatin     Asthma  -albuterol prn     JAGRUTI    Gout hx  -allopurinol     Quality:  DVT Prophylaxis: scd, lovenox  CODE status: Full per chart  Rea:     Plan of care discussed with patient and staff    Dispo: no discharge    Jerome Hernandez MD  \[facility]\""ist  [phone]                 [1]   Allergies  Allergen Reactions    Betadine [Povidone Iodine] ITCHING and SWELLING    Ragweed Runny nose          Seasonal Runny nose    Tree, Elm Runny nose     All trees   [2]   No current facility-administered medications on file prior to encounter.     Current Outpatient Medications on File Prior to Encounter   Medication Sig Dispense Refill    ferrous sulfate 325 (65 FE) MG Oral Tab EC Take 1 tablet (325 mg total) by mouth daily with breakfast.      buPROPion  MG Oral Tablet 24 Hr Take 1 tablet (150 mg total) by mouth daily.      rosuvastatin 20 MG Oral Tab Take 1 tablet (20 mg total) by mouth nightly.      metFORMIN 500 MG Oral Tab Take 3 tablets (1,500 mg total) by mouth at bedtime.      torsemide 20 MG Oral Tab Take 1 tablet (20 mg total) by mouth daily.      Zinc 50 MG Oral Tab Take 1 tablet by mouth daily.      aspirin 81 MG Oral Tab EC Take 1 tablet (81 mg total) by mouth daily.  0    carvedilol 25 MG Oral Tab Take 1 tablet (25 mg  total) by mouth 2 (two) times daily.      topiramate 50 MG Oral Tab Take 0.5 tablets (25 mg total) by mouth 2 (two) times daily.      MONTELUKAST 10 MG Oral Tab TAKE 1 TABLET BY MOUTH EVERY DAY 90 tablet 0    ALLOPURINOL 100 MG Oral Tab TAKE 2 TABLETS BY MOUTH EVERY  tablet 0    losartan 100 MG Oral Tab Take 1 tablet (100 mg total) by mouth daily. 90 tablet 3    spironolactone 25 MG Oral Tab Take 1 tablet (25 mg total) by mouth daily. 90 tablet 3    diphenhydrAMINE HCl 25 MG Oral Cap Take 1 capsule (25 mg total) by mouth as needed for Allergies.      Glucosamine HCl (GLUCOSAMINE OR) Take 2 tablets by mouth daily.      Cholecalciferol (VITAMIN D3 OR) Take 1 tablet by mouth daily.        EPINEPHrine (EPIPEN 2-PENNY) 0.3 MG/0.3ML Injection Solution Auto-injector Use as directed, for anaphylaxis, call 911 2 each 0    Azelaic Acid (FINACEA) 15 % External Gel Apply to face daily 50 g 3    Multiple Vitamins-Minerals (OPTISOURCE POST BARIATRIC SURG) Oral Chew Tab Chew 2 tablets by mouth daily.      ALBUTEROL 108 (90 Base) MCG/ACT Inhalation Aero Soln INHALE 2 PUFFS INTO THE LUNGS EVERY 4 TO 6 HOURS AS NEEDED FOR WHEEZING 20.1 each 0    Albuterol Sulfate (2.5 MG/3ML) 0.083% Inhalation Nebu Soln Take 3 mL (2.5 mg total) by nebulization every 4 (four) hours as needed for Wheezing or Shortness of Breath. 3 Box 0    Colchicine 0.6 MG Oral Tab 1 tablet at start of gout attack . May repeat in 4-6 hours. No greater than 2 pills daily 30 Tab 4

## 2025-02-12 NOTE — ANESTHESIA PREPROCEDURE EVALUATION
PRE-OP EVALUATION    Patient Name: Leonel Lloyd    Admit Diagnosis: LESION OF ADRENAL GLAND  Pre-op testing    Pre-op Diagnosis: LESION OF ADRENAL GLAND    ROBOTIC ASSISTED LAPAROSCOPIC LEFT ADRENALECTOMY    Anesthesia Procedure: ROBOTIC ASSISTED LAPAROSCOPIC LEFT ADRENALECTOMY (Left: Abdomen)    Surgeons and Role:     * Cornell Richards MD - Primary    Pre-op vitals reviewed.  Temp: 97.8 °F (36.6 °C)  Pulse: 68  Resp: 17  BP: 123/85  SpO2: 96 %  Body mass index is 41.16 kg/m².    Current medications reviewed.  Hospital Medications:  • [Transfer Hold] acetaminophen (Tylenol Extra Strength) tab 1,000 mg  1,000 mg Oral Once   • lactated ringers infusion   Intravenous Continuous   • [COMPLETED] heparin (Porcine) 5000 UNIT/ML injection 5,000 Units  5,000 Units Subcutaneous Once   • ceFAZolin (Ancef) 3 g in sodium chloride 0.9% 100mL IVPB premix  3 g Intravenous Once   • bupivacaine PF (Marcaine) 0.25% injection    PRN       Outpatient Medications:   Prescriptions Prior to Admission[1]    Allergies: Betadine [povidone iodine]; Ragweed; Seasonal; and Tree, elm      Anesthesia Evaluation    Patient summary reviewed.    Anesthetic Complications  (+) history of anesthetic complications  History of: difficult airway       GI/Hepatic/Renal    Negative GI/hepatic/renal ROS.  (+) GERD                           Cardiovascular    Negative cardiovascular ROS.  ECG reviewed.  Exercise tolerance: good     MET: >4    (+) obesity  (+) hypertension                                     Endo/Other    Negative endo/other ROS.                       (+) arthritis       Pulmonary    Negative pulmonary ROS.  (+) asthma              (+) sleep apnea and CPAP      Neuro/Psych    Negative neuro/psych ROS.             (+) neuromuscular disease                 Past Surgical History:   Procedure Laterality Date   • Arthroscopy of joint unlisted Bilateral     SHOULDER REPLACEMENT   • Colonoscopy  08/14/2013    Procedure: COLONOSCOPY;  Surgeon: Patsy  Marshall LOO MD;  Location:  ENDOSCOPY   • Colonoscopy N/A 07/16/2015    Procedure: COLONOSCOPY;  Surgeon: Marshall Garner MD;  Location:  ENDOSCOPY   • Colonoscopy N/A 07/03/2018    Procedure: COLONOSCOPY, POSSIBLE BIOPSY, POSSIBLE POLYPECTOMY 94872;  Surgeon: Marshall Garner MD;  Location: [facility]   • Colonoscopy N/A 07/13/2021    Procedure: COLONOSCOPY;  Surgeon: Tim Ramírez MD;  Location:  ENDOSCOPY   • Debride mastoid cavity,simple     • Ear cartilage graft to face  01/11/2011    Performed by RERE POLLARD at [facility]   • Exc shoulder les sc > 3 cm Right 07/02/2014    Procedure: EXCISION OF ARM MASS;  Surgeon: Ar Ortiz MD;  Location: [facility]   • Excision turbinate,submucous  06/23/2010    Performed by RIOS LIND at [facility]   • Excision turbinate,submucous  06/23/2010    Performed by IROS LIND at [facility]   • Gastric bypass,obesity,sb reconstruc  2007   • Mastoidectomy,simple      mastoid surgery   • Other Left     shoulder   • Other surgical history  10/12/2012    Prostate Biopsy - Dr. oKvacs    • Reconstr total shoulder implant Bilateral     Right reverse shoulder arthroplasty    • Remv cartilage for graft nasal  01/11/2011    Performed by RERE POLLARD at [facility]   • Repair nasal stenosis  01/11/2011    Performed by RERE POLLARD at [facility]   • Repair of nasal septum  06/23/2010    Performed by RIOS LIND at [facility]   • Skin surgery  09/05/2008    Scar to L shoulder s/p excision BCC 9/5/08   • Spine surgery procedure unlisted      lumbar   • Total knee replacement Bilateral      Social History     Socioeconomic History   • Marital status:    Tobacco Use   • Smoking status: Former     Current packs/day: 0.00     Average packs/day: 1.5 packs/day for 20.0 years (30.0 ttl pk-yrs)     Types: Cigarettes     Start date: 6/1/1978     Quit date: 6/1/1998      Years since quittin.7   • Smokeless tobacco: Never   Vaping Use   • Vaping status: Never Used   Substance and Sexual Activity   • Alcohol use: Yes     Comment: occasional   • Drug use: No   • Sexual activity: Yes     Partners: Female     History   Drug Use No     Available pre-op labs reviewed.               Airway      Mallampati: II  Mouth opening: 3 FB  TM distance: 4 - 6 cm  Neck ROM: full Cardiovascular    Cardiovascular exam normal.         Dental  Comment: Normal wear/minor imperfections and discoloration noted. No grossly weakened or damaged teeth on exam.           Pulmonary    Pulmonary exam normal.                 Other findings        ASA: 3   Plan: general  NPO status verified and patient meets guidelines.    Post-procedure pain management plan discussed with surgeon and patient.  Surgeon requests: regional block  Comment: We discussed general anesthesia as the primary form of anesthesia for this procedure. General anesthesia involves the use a breathing tube to help the patient breathe and deliver anesthetic gasses. Several intravenous medications will be used to help keep the patient safe and comfortable.  Common risks with general anesthesia include nausea, vomiting, scratched eye, sore throat and dental damage. The risk of dental damage is higher for weakened or damaged teeth. Rare but serious risks can occur. Some of these serious complications include brain injury, nerve injury, blindness, and death. Risks of these serious injury are small, but never zero. I asked the patient if they had any questions about the consent form and what was written on it. The patient was given the opportunity to ask questions in the pre op area and in the operating room before going to sleep. All questions were answered.  Discussed dedrick and possible tap block, additional IV              Present on Admission:  **None**             [1]   Medications Prior to Admission   Medication Sig Dispense Refill Last  Dose/Taking   • ferrous sulfate 325 (65 FE) MG Oral Tab EC Take 1 tablet (325 mg total) by mouth daily with breakfast.   1/29/2025   • buPROPion  MG Oral Tablet 24 Hr Take 1 tablet (150 mg total) by mouth daily.   2/12/2025 at  3:00 AM   • rosuvastatin 20 MG Oral Tab Take 1 tablet (20 mg total) by mouth nightly.   2/11/2025 at  7:00 PM   • metFORMIN 500 MG Oral Tab Take 3 tablets (1,500 mg total) by mouth at bedtime.   2/11/2025 at  8:00 PM   • torsemide 20 MG Oral Tab Take 1 tablet (20 mg total) by mouth daily.   2/11/2025 at  8:00 AM   • Zinc 50 MG Oral Tab Take 1 tablet by mouth daily.   1/29/2025   • aspirin 81 MG Oral Tab EC Take 1 tablet (81 mg total) by mouth daily.  0 1/29/2025   • carvedilol 25 MG Oral Tab Take 1 tablet (25 mg total) by mouth 2 (two) times daily.   2/12/2025 at  3:00 AM   • topiramate 50 MG Oral Tab Take 0.5 tablets (25 mg total) by mouth 2 (two) times daily.   2/12/2025 at  3:00 AM   • MONTELUKAST 10 MG Oral Tab TAKE 1 TABLET BY MOUTH EVERY DAY 90 tablet 0 2/12/2025 at  3:00 AM   • ALLOPURINOL 100 MG Oral Tab TAKE 2 TABLETS BY MOUTH EVERY  tablet 0 2/12/2025 at  3:00 AM   • losartan 100 MG Oral Tab Take 1 tablet (100 mg total) by mouth daily. 90 tablet 3 2/11/2025 at  8:00 AM   • spironolactone 25 MG Oral Tab Take 1 tablet (25 mg total) by mouth daily. 90 tablet 3 2/12/2025 at  3:00 AM   • diphenhydrAMINE HCl 25 MG Oral Cap Take 1 capsule (25 mg total) by mouth as needed for Allergies.   2/11/2025 at  8:00 PM   • Glucosamine HCl (GLUCOSAMINE OR) Take 2 tablets by mouth daily.   1/29/2025   • Cholecalciferol (VITAMIN D3 OR) Take 1 tablet by mouth daily.     Taking   • EPINEPHrine (EPIPEN 2-PENNY) 0.3 MG/0.3ML Injection Solution Auto-injector Use as directed, for anaphylaxis, call 911 2 each 0 Taking   • Azelaic Acid (FINACEA) 15 % External Gel Apply to face daily 50 g 3 More than a month   • Multiple Vitamins-Minerals (OPTISOURCE POST BARIATRIC SURG) Oral Chew Tab Chew 2 tablets  by mouth daily.   1/29/2025   • ALBUTEROL 108 (90 Base) MCG/ACT Inhalation Aero Soln INHALE 2 PUFFS INTO THE LUNGS EVERY 4 TO 6 HOURS AS NEEDED FOR WHEEZING 20.1 each 0 More than a month   • Albuterol Sulfate (2.5 MG/3ML) 0.083% Inhalation Nebu Soln Take 3 mL (2.5 mg total) by nebulization every 4 (four) hours as needed for Wheezing or Shortness of Breath. 3 Box 0 More than a month   • Colchicine 0.6 MG Oral Tab 1 tablet at start of gout attack . May repeat in 4-6 hours. No greater than 2 pills daily 30 Tab 4 More than a month

## 2025-02-12 NOTE — ANESTHESIA POSTPROCEDURE EVALUATION
[facility] [place]    Leonel Lloyd Patient Status:  Inpatient   Age/Gender 65 year old male MRN [id number]   Location [facility] SURGERY Attending Cornell Richards MD   Hosp Day # 0 PCP Judson Leon MD       Anesthesia Post-op Note    ROBOTIC ASSISTED LAPAROSCOPIC LEFT ADRENALECTOMY    Procedure Summary       Date: 02/12/25 Room / Location:  MAIN OR [address] [place] [address] MAIN OR    Anesthesia Start: 0804 Anesthesia Stop:     Procedure: ROBOTIC ASSISTED LAPAROSCOPIC LEFT ADRENALECTOMY (Left: Abdomen) Diagnosis: (LESION OF ADRENAL GLAND)    Surgeons: Cornell Richards MD Anesthesiologist: Haroon Bass MD    Anesthesia Type: general ASA Status: 3            Anesthesia Type: general    Vitals Value Taken Time   /95 02/12/25 1117   Temp 97.3 02/12/25 1122   Pulse 64 02/12/25 1121   Resp 21 02/12/25 1121   SpO2 88 % 02/12/25 1121   Vitals shown include unfiled device data.        Patient Location: PACU    Anesthesia Type: general    Airway Patency: patent and extubated    Postop Pain Control: adequate    Mental Status: mildly sedated but able to meaningfully participate in the post-anesthesia evaluation    Nausea/Vomiting: none    Cardiopulmonary/Hydration status: stable euvolemic    Complications: no apparent anesthesia related complications    Postop vital signs: stable    Dental Exam: Unchanged from Preop    Patient to be discharged from PACU when criteria met.

## 2025-02-12 NOTE — ANESTHESIA PROCEDURE NOTES
Peripheral IV  Date/Time: 2/12/2025 9:40 AM  Inserted by: Haroon Bass MD    Placement  Needle size: 20 G  Laterality: right  Location: forearm  Local anesthetic: none  Site prep: alcohol  Technique: anatomical landmarks  Attempts: 1

## 2025-02-12 NOTE — OPERATIVE REPORT
OPERATIVE REPORT    PATIENT NAME: Leonel Lloyd  YOB: 1960  DATE OF SERVICE: 2/12/2025    PREOPERATIVE DIAGNOSIS:  3.9 cm Left adrenal adenoma with MACS (Mild autonomous cortisol secretion)     POSTOPERATIVE DIAGNOSIS:  Same    PROCEDURES PERFORMED:  Robotic-assisted laparoscopic left adrenalectomy    SURGEON:  Cornell Richards MD    ASSISTANTS:  Carla Levine    ANESTHESIA:  General Endotracheal    ANTIBIOTIC PROPHYLAXIS:  3g Ancef    ESTIMATED BLOOD LOSS:  25 mL    SPECIMENS:  Left adrenal gland    DRAINS:  None    COMPLICATIONS:  No immediate complications     INDICATIONS FOR PROCEDURE:  Mr. Lloyd is a 65 year old gentleman who has had a left adrenal adenoma for >10 years.  It has been fairly stable in size. He saw endocrinology for an evaluation of this lesion, and was found to have mild autonomous cortisol secretion (MACS). He was counseled about medical treatment options, versus proceeding with an adrenalectomy.  He elected to proceed with a robotic-assisted left adrenalectomy.    We discussed the robotic adrenalectomy procedure in detail.  The procedure itself, as well as the expected convalescent period were reviewed.  Risks of this procedure include, but are not limited to bleeding (sometimes requiring transfusion), infection, damage to abdominal structures including liver, spleen, small bowel, colon, or the great vessels, adrenal insufficiency, renal failure (rarely requiring dialysis), as well as risks of general anesthesia.     DESCRIPTION OF PROCEDURE:  After reviewing the indications for the procedure, informed consent was reviewed and signed by the patient.    The patient was brought to the operating room and placed in the supine position on the OR table.  SCD's were applied and all pressure points were carefully padded. Perioperative antibiotics and 5000U subcutaneous heparin were given.  At this point, general endotracheal anesthesia was successfully induced.  A lees catheter was placed  and the bladder was drained fully.  The patient was then positioned in the right lateral decubitus position.  The bed was flexed slightly to open the space between the costal margin and the ASIS.  All pressure points were carefully padded.  An axillary roll was placed.  The patient was then secured to the bed using silk tape. They were then prepped and draped in the usual sterile fashion using chlorhexidine solution.     We then performed a surgical time out to confirm the correct patient, position, and laterality.  Everyone in the room was in agreement.      I began by inserting a Veress needle into the left upper quadrant.  A drop test was performed and there was no return of blood or enteric contents.  I then insufflated to 15mmHG and removed the Veress needle.  At this point an 8mm robotic trocar was placed into the left upper quadrant at the same position as the Veress.  A 0 degree camera was inserted and the peritoneum was inspected carefully. There were several upper quadrant and midline adhesions from his prior open adriana-en-y gastric bypass.   3 additional 8mm robotic ports were placed in a linear fashion to the left of the midline. A 12mm assistant port was placed near the midline  These were done under direct vision and there was no evidence of injury to the intraperitoneal contents.  I began by performing a lysis of adhesions using endo luly.  The left upper quadrant adhesions between the omentum and abdominal wall were lysed, in order to visualize his spleen and splenic flexure of the colon.     At this point the table was \"airplaned\" towards the patient's right side to facilitate gravity retraction of the bowel.  The robot was then docked and all instruments were placed under direct vision.     I began incising along the descending colon along the line of Toldt to reflect the colon medially to expose the retroperitoneum.  I created a plane between Gerota's fascia and the colonic mesentery.      I  identified the left gonadal vein and dissected along its anterior surface cephalad until I identified the renal vein.  I dissected the renal vein free, and identified the left adrenal vein.  I ligated this using hemo lock clips.  I then used a combination of monopolar and bipolar cautery to free the adrenal gland from its posterior and medial attachments.  Care was taken to dissect the adrenal gland away from the splenic vessels, which were in close proximity.  I developed a plane between the adrenal gland and medial aspect of the kidney.  I then dissected the medial attachments of the adrenal gland off of the aorta and crux of the diaphragm.  I identified the adrenal adenoma within the specimen, and was able to excise the entire adrenal gland with grossly negative margins.  The adrenal gland was placed in an endo catch bag, for later retrieval. I inspected for hemostasis in the adrenal fossa, and it appeared excellent. I laid down some surgicel to obtain additional hemostasis.     The robot was undocked and all instruments were removed.  The 12mm assistant port was closed using a 0 PDS and an endo close device.     We extracted the adrenal gland in the endo catch bag by extending the robotic 4th arm incision in the left lower quadrant. The specimen was removed and sent to pathology as \"left adrenal gland.\"  I closed the fascia using an interrupted 0 PDS suture in a figure of eight fashion. We reinserted the robotic camera, to ensure no bowel or omentum was snared in the fascial closure.     The abdomen was completely desufflated and all robotic ports were removed.  Skin was closed using subcuticular 4-0 monocryl sutures.  Derma-Bond was applied.      Our sponge, instrument, and needle counts were correct x2.  The patient was awoken from anesthesia and transferred to the recovery room in stable condition.     Please note, I was present for and performed the entirety of the procedure. Carla Julianna served as my bedside  assistant for the entirety of the procedure.     PLAN:  -Admit to observation for 1-2 nights. Rea can be removed in PACU. Check labs in PACU, and tomorrow AM.     Cornell Richards MD  [facility]  Office: [phone]

## 2025-02-12 NOTE — ANESTHESIA PROCEDURE NOTES
Regional Block    Date/Time: 2/12/2025 11:05 AM    Performed by: Haroon Bass MD  Authorized by: Haroon Bass MD      General Information and Staff    Start Time:  2/12/2025 11:05 AM  End Time:  2/12/2025 11:07 AM  Anesthesiologist:  Haroon Bass MD  Performed by:  Anesthesiologist  Patient Location:  OR    Block Placement: Post Induction  Site Identification: real time ultrasound guided and image stored and retrievable    Block site/laterality marked before start: site marked  Reason for Block: at surgeon's request and post-op pain management    Preanesthetic Checklist: 2 patient identifers, IV checked, risks and benefits discussed, monitors and equipment checked, pre-op evaluation, timeout performed, anesthesia consent, sterile technique used, no prohibitive neurological deficits and no local skin infection at insertion site      Procedure Details    Patient Position:  Right lateral decubitus  Prep: ChloraPrep    Monitoring:  Cardiac monitor, continuous pulse ox, blood pressure cuff and heart rate  Block Type:  TAP  Laterality:  Left  Injection Technique:  Single-shot    Needle    Needle Type:  Short-bevel and echogenic  Needle Gauge:  22 G  Needle Length:  110 mm  Needle Localization:  Ultrasound guidance  Reason for Ultrasound Use: appropriate spread of the medication was noted in real time and no ultrasound evidence of intravascular and/or intraneural injection            Assessment    Injection Assessment:  Good spread noted, negative resistance, negative aspiration for heme, incremental injection and low pressure  Heart Rate Change: No    - Patient tolerated block procedure well without evidence of immediate block related complications.     Medications  2/12/2025 11:05 AM      Additional Comments    Medication:  Bupivacaine 0.25% 30mL with dex pf 2mg

## 2025-02-13 LAB
ANION GAP SERPL CALC-SCNC: 4 MMOL/L (ref 0–18)
ANION GAP SERPL CALC-SCNC: 7 MMOL/L (ref 0–18)
BASOPHILS # BLD AUTO: 0.01 X10(3) UL (ref 0–0.2)
BASOPHILS # BLD AUTO: 0.02 X10(3) UL (ref 0–0.2)
BASOPHILS NFR BLD AUTO: 0.1 %
BASOPHILS NFR BLD AUTO: 0.2 %
BUN BLD-MCNC: 29 MG/DL (ref 9–23)
BUN BLD-MCNC: 30 MG/DL (ref 9–23)
CALCIUM BLD-MCNC: 8.3 MG/DL (ref 8.7–10.6)
CALCIUM BLD-MCNC: 8.5 MG/DL (ref 8.7–10.6)
CHLORIDE SERPL-SCNC: 109 MMOL/L (ref 98–112)
CHLORIDE SERPL-SCNC: 110 MMOL/L (ref 98–112)
CO2 SERPL-SCNC: 19 MMOL/L (ref 21–32)
CO2 SERPL-SCNC: 19 MMOL/L (ref 21–32)
CREAT BLD-MCNC: 1.69 MG/DL
CREAT BLD-MCNC: 1.85 MG/DL
EGFRCR SERPLBLD CKD-EPI 2021: 39 ML/MIN/1.73M2 (ref 60–?)
EGFRCR SERPLBLD CKD-EPI 2021: 44 ML/MIN/1.73M2 (ref 60–?)
EOSINOPHIL # BLD AUTO: 0 X10(3) UL (ref 0–0.7)
EOSINOPHIL # BLD AUTO: 0 X10(3) UL (ref 0–0.7)
EOSINOPHIL NFR BLD AUTO: 0 %
EOSINOPHIL NFR BLD AUTO: 0 %
ERYTHROCYTE [DISTWIDTH] IN BLOOD BY AUTOMATED COUNT: 13.7 %
ERYTHROCYTE [DISTWIDTH] IN BLOOD BY AUTOMATED COUNT: 13.8 %
GLUCOSE BLD-MCNC: 106 MG/DL (ref 70–99)
GLUCOSE BLD-MCNC: 121 MG/DL (ref 70–99)
GLUCOSE BLD-MCNC: 132 MG/DL (ref 70–99)
GLUCOSE BLD-MCNC: 132 MG/DL (ref 70–99)
GLUCOSE BLD-MCNC: 134 MG/DL (ref 70–99)
GLUCOSE BLD-MCNC: 200 MG/DL (ref 70–99)
HCT VFR BLD AUTO: 29.2 %
HCT VFR BLD AUTO: 31.9 %
HCT VFR BLD AUTO: 34.6 %
HGB BLD-MCNC: 10.5 G/DL
HGB BLD-MCNC: 11.2 G/DL
HGB BLD-MCNC: 9.7 G/DL
IMM GRANULOCYTES # BLD AUTO: 0.07 X10(3) UL (ref 0–1)
IMM GRANULOCYTES # BLD AUTO: 0.11 X10(3) UL (ref 0–1)
IMM GRANULOCYTES NFR BLD: 0.7 %
IMM GRANULOCYTES NFR BLD: 1.1 %
LACTATE SERPL-SCNC: 1.5 MMOL/L (ref 0.5–2)
LACTATE SERPL-SCNC: 2.6 MMOL/L (ref 0.5–2)
LYMPHOCYTES # BLD AUTO: 0.68 X10(3) UL (ref 1–4)
LYMPHOCYTES # BLD AUTO: 0.84 X10(3) UL (ref 1–4)
LYMPHOCYTES NFR BLD AUTO: 6.6 %
LYMPHOCYTES NFR BLD AUTO: 8.2 %
MAGNESIUM SERPL-MCNC: 2 MG/DL (ref 1.6–2.6)
MCH RBC QN AUTO: 31.2 PG (ref 26–34)
MCH RBC QN AUTO: 31.2 PG (ref 26–34)
MCHC RBC AUTO-ENTMCNC: 32.4 G/DL (ref 31–37)
MCHC RBC AUTO-ENTMCNC: 32.9 G/DL (ref 31–37)
MCV RBC AUTO: 94.7 FL
MCV RBC AUTO: 96.4 FL
MONOCYTES # BLD AUTO: 0.94 X10(3) UL (ref 0.1–1)
MONOCYTES # BLD AUTO: 1.02 X10(3) UL (ref 0.1–1)
MONOCYTES NFR BLD AUTO: 9.1 %
MONOCYTES NFR BLD AUTO: 9.9 %
NEUTROPHILS # BLD AUTO: 8.35 X10 (3) UL (ref 1.5–7.7)
NEUTROPHILS # BLD AUTO: 8.35 X10(3) UL (ref 1.5–7.7)
NEUTROPHILS # BLD AUTO: 8.6 X10 (3) UL (ref 1.5–7.7)
NEUTROPHILS # BLD AUTO: 8.6 X10(3) UL (ref 1.5–7.7)
NEUTROPHILS NFR BLD AUTO: 81.1 %
NEUTROPHILS NFR BLD AUTO: 83 %
OSMOLALITY SERPL CALC.SUM OF ELEC: 281 MOSM/KG (ref 275–295)
OSMOLALITY SERPL CALC.SUM OF ELEC: 290 MOSM/KG (ref 275–295)
PLATELET # BLD AUTO: 144 10(3)UL (ref 150–450)
PLATELET # BLD AUTO: 167 10(3)UL (ref 150–450)
POTASSIUM SERPL-SCNC: 4.4 MMOL/L (ref 3.5–5.1)
POTASSIUM SERPL-SCNC: 4.8 MMOL/L (ref 3.5–5.1)
RBC # BLD AUTO: 3.37 X10(6)UL
RBC # BLD AUTO: 3.59 X10(6)UL
SODIUM SERPL-SCNC: 132 MMOL/L (ref 136–145)
SODIUM SERPL-SCNC: 136 MMOL/L (ref 136–145)
WBC # BLD AUTO: 10.3 X10(3) UL (ref 4–11)
WBC # BLD AUTO: 10.4 X10(3) UL (ref 4–11)

## 2025-02-13 PROCEDURE — 85025 COMPLETE CBC W/AUTO DIFF WBC: CPT | Performed by: INTERNAL MEDICINE

## 2025-02-13 PROCEDURE — 85014 HEMATOCRIT: CPT | Performed by: PHYSICIAN ASSISTANT

## 2025-02-13 PROCEDURE — 85018 HEMOGLOBIN: CPT | Performed by: PHYSICIAN ASSISTANT

## 2025-02-13 PROCEDURE — 83605 ASSAY OF LACTIC ACID: CPT | Performed by: INTERNAL MEDICINE

## 2025-02-13 PROCEDURE — 83735 ASSAY OF MAGNESIUM: CPT | Performed by: UROLOGY

## 2025-02-13 PROCEDURE — 80048 BASIC METABOLIC PNL TOTAL CA: CPT | Performed by: INTERNAL MEDICINE

## 2025-02-13 PROCEDURE — 82962 GLUCOSE BLOOD TEST: CPT

## 2025-02-13 RX ORDER — ACETAMINOPHEN 325 MG/1
650 TABLET ORAL EVERY 6 HOURS PRN
Status: DISCONTINUED | OUTPATIENT
Start: 2025-02-13 | End: 2025-02-14

## 2025-02-13 NOTE — PROGRESS NOTES
[facility]  Urology Progress Note    Leonel Lloyd Patient Status:  Inpatient    1960 MRN [id number]   Location [facility] 3NW-A Attending Cornell Richards MD   Hosp Day # 1 PCP Judson Leon MD     Subjective:  Leonel Lloyd is a(n) 65 year old male.    S/P robotic-assisted laparoscopic left adrenalectomy 25 with Dr. Richards    Current complaints: Bolus overnight for hypotension.  Pain controlled.  No nausea, vomiting, fever, or chills. Tolerating diet.  Passing some flatus.  No CP, SOB, or calf pain. No lightheadedness/dizziness.  Ambulating. Voiding ok.      Objective:  General appearance: alert, appears stated age, and cooperative  Blood pressure 94/60, pulse 69, temperature 97.8 °F (36.6 °C), temperature source Axillary, resp. rate 20, height 5' 10.5\" (1.791 m), weight 291 lb (132 kg), SpO2 98%.  Repeat /59  Lungs: non-labored respirations  Abdomen: soft with expected incisional tenderness.  Incisions C/D/I.  +ecchymosis to left lower abdomen    UOP 2000 mL total yesterday         Assessment:    3.9 CM LEFT ADRENAL ADENOMA WITH MACS (MILD AUTONOMOUS CORTICAL SECRETION)  S/P robotic-assisted laparoscopic left adrenalectomy 25 with Dr. Richards  Suspect bled from extraction incision from LLQ  Afebrile  BP improved with bolus  Lactic acid 2.6  No leukocytosis  Hgb 14 > 11.2 (was 14.1 on 10/4/24)  Serum creat 1.41 > 1.85 (was 1.48 on 1/15/25)  Repeat labs to be collected  Pathology pending    Plan:    Encouraged ambulation and use of IS x 10./hr  DC lovenox  Follow labs, temp/vitals  Pain management  Bowel regimen  Nurse will outline ecchymotic area to be monitored  Hospitalist following - nurse will update hospitalist on above and recommendations regarding BP medications.      Dr. Richards to see patient today    Above discussed with patient, nurse, Dr. Richards.    Liza Burch PA-C  [facility]  Department of Urology  2025  5:42 AM    Addendum:  Recent Labs   Lab  02/12/25  1154 02/13/25  0157 02/13/25  0606   RBC 4.46 3.59* 3.37*   HGB 14.0 11.2* 10.5*   HCT 42.0 34.6* 31.9*   MCV 94.2 96.4 94.7   MCH 31.4 31.2 31.2   MCHC 33.3 32.4 32.9   RDW 13.7 13.7 13.8   NEPRELIM  --  8.60* 8.35*   WBC 8.7 10.4 10.3   .0 167.0 144.0*       Recent Labs   Lab 02/12/25  1154 02/13/25  0157 02/13/25  0605   * 121* 132*   BUN 28* 29* 30*   CREATSERUM 1.41* 1.85* 1.69*   EGFRCR 55* 39* 44*   CA 8.7 8.3* 8.5*    132* 136   K 4.8 4.8 4.4    109 110   CO2 21.0 19.0* 19.0*     Lactic acid 2.6 > 1.5  No leukocytosis  Hgb 14 > 11.2 > 10.5 (was 14.1 on 10/4/24)  Serum creat 1.41 > 1.85 > 1.69 (was 1.48 on 1/15/25)    Continue to follow labs,  Repeat H/H at 2 PM    Meghan, P;A.-C  Urology

## 2025-02-13 NOTE — PLAN OF CARE
Pt A&Ox4. VSS on RA. NSR-ST 80s-100s on tele.  WDL. CPAP at night. Pt denies chest pain, sob, nausea or emesis. SCDS on bilaterally. Abdomen soft, round, tender. Denies passing gas. 5laps with glue, bruising noted at lower most lap site. Voids freely in adequate amounts. Tolerating diet. IVF infusing per orders. Prn oxy and zanaflex given for incisional pain and back spasms-pain and spasms improved upon reassessment. Pt updated on poc, questions and concerns addressed. Call light in reach    0130:pt bp hypotensive. Pt denies symptoms. MD contacted. Ordered 1L bolus NS. Lactic, cbc, cmp.    0330:labs results relayed to md. Lactic 2.6. hgb 14.0-11.2. bp slightly improved. Orders to repeat labs 4 hours after initial draw.

## 2025-02-13 NOTE — PLAN OF CARE
Problem: PAIN - ADULT  Goal: Verbalizes/displays adequate comfort level or patient's stated pain goal  Description: INTERVENTIONS:  - Encourage pt to monitor pain and request assistance  - Assess pain using appropriate pain scale  - Administer analgesics based on type and severity of pain and evaluate response  - Implement non-pharmacological measures as appropriate and evaluate response  - Consider cultural and social influences on pain and pain management  - Manage/alleviate anxiety  - Utilize distraction and/or relaxation techniques  - Monitor for opioid side effects  - Notify MD/LIP if interventions unsuccessful or patient reports new pain  - Anticipate increased pain with activity and pre-medicate as appropriate  Outcome: Progressing     Problem: RISK FOR INFECTION - ADULT  Goal: Absence of fever/infection during anticipated neutropenic period  Description: INTERVENTIONS  - Monitor WBC  - Administer growth factors as ordered  - Implement neutropenic guidelines  Outcome: Progressing     Problem: SAFETY ADULT - FALL  Goal: Free from fall injury  Description: INTERVENTIONS:  - Assess pt frequently for physical needs  - Identify cognitive and physical deficits and behaviors that affect risk of falls.  - Jemison fall precautions as indicated by assessment.  - Educate pt/family on patient safety including physical limitations  - Instruct pt to call for assistance with activity based on assessment  - Modify environment to reduce risk of injury  - Provide assistive devices as appropriate  - Consider OT/PT consult to assist with strengthening/mobility  - Encourage toileting schedule  Outcome: Progressing     Problem: DISCHARGE PLANNING  Goal: Discharge to home or other facility with appropriate resources  Description: INTERVENTIONS:  - Identify barriers to discharge w/pt and caregiver  - Include patient/family/discharge partner in discharge planning  - Arrange for needed discharge resources and transportation as  appropriate  - Identify discharge learning needs (meds, wound care, etc)  - Arrange for interpreters to assist at discharge as needed  - Consider post-discharge preferences of patient/family/discharge partner  - Complete POLST form as appropriate  - Assess patient's ability to be responsible for managing their own health  - Refer to Case Management Department for coordinating discharge planning if the patient needs post-hospital services based on physician/LIP order or complex needs related to functional status, cognitive ability or social support system  Outcome: Progressing     Problem: Diabetes/Glucose Control  Goal: Glucose maintained within prescribed range  Description: INTERVENTIONS:  - Monitor Blood Glucose as ordered  - Assess for signs and symptoms of hyperglycemia and hypoglycemia  - Administer ordered medications to maintain glucose within target range  - Assess barriers to adequate nutritional intake and initiate nutrition consult as needed  - Instruct patient on self management of diabetes  Outcome: Progressing

## 2025-02-13 NOTE — PLAN OF CARE
Patient resting in bed. VSS. Patient denies chest/calf pain. Abdomen firm, rounding, good bowel sounds noted. Lap sites intact. Bruising noted on LLQ (outlined). POC discussed with patient, all questions and concerns addressed. All safety measures in place.

## 2025-02-13 NOTE — PROGRESS NOTES
[facility] Hospitalist Progress Note                                                                     [facility]   part of [facility]      Leonel Lloyd  1/5/1960    SUBJECTIVE: No chest pain, palpitations, shortness of breath, cough, nausea, vomiting. Abdominal pain controlled.     OBJECTIVE:  Temp:  [97.3 °F (36.3 °C)-97.9 °F (36.6 °C)] 97.8 °F (36.6 °C)  Pulse:  [] 69  Resp:  [10-23] 20  BP: ()/(58-95) 94/60  SpO2:  [88 %-100 %] 98 %  Exam  Gen: No acute distress, alert and oriented  Pulm: Lungs clear bilaterally, normal respiratory effort, no crackles, no wheezing  CV: Heart with regular rate and rhythm, no murmur.   Abd: Abdomen soft, nontender, nondistended, bowel sounds present, mild bruising at surgical site  MSK: no significant pitting edema or tenderness of the LE  Skin: no new  rashes or lesions, bruising at the surgical site    Labs:   Recent Labs   Lab 02/12/25  1154 02/13/25  0157 02/13/25  0606   WBC 8.7 10.4 10.3   HGB 14.0 11.2* 10.5*   MCV 94.2 96.4 94.7   .0 167.0 144.0*       Recent Labs   Lab 02/12/25  1154 02/13/25  0157 02/13/25  0605    132* 136   K 4.8 4.8 4.4    109 110   CO2 21.0 19.0* 19.0*   BUN 28* 29* 30*   CREATSERUM 1.41* 1.85* 1.69*   CA 8.7 8.3* 8.5*   MG  --   --  2.0   * 121* 132*       No results for input(s): \"ALT\", \"AST\", \"ALB\", \"AMYLASE\", \"LIPASE\", \"LDH\" in the last 168 hours.    Invalid input(s): \"ALPHOS\", \"TBIL\", \"DBIL\", \"TPROT\"    Recent Labs   Lab 02/12/25  1315 02/12/25  1821 02/12/25 2054 02/13/25  0542   [facility] 207[facility] 132*       Meds:   Scheduled:    allopurinol  200 mg Oral Daily    buPROPion ER  150 mg Oral Daily    montelukast  10 mg Oral Daily    topiramate  25 mg Oral BID    rosuvastatin  20 mg Oral Nightly    sennosides  17.2 mg Oral Nightly    docusate sodium  100 mg Oral BID    insulin aspart  1-10 Units Subcutaneous TID AC and HS     Continuous Infusions:     sodium chloride 125 mL/hr at 02/13/25 0640     PRN:   albuterol    polyethylene glycol (PEG 3350)    ondansetron    metoclopramide    oxyCODONE **OR** oxyCODONE    HYDROmorphone **OR** HYDROmorphone    melatonin    benzocaine-menthol    tiZANidine    ASSESSMENT / PLAN:   65 year old male with history of allergic rhinitis, asthma, BPH, skin cancer, GERD, hypertension, obstructive sleep apnea presenting with 3.9 cm left adrenal adenoma with MAC S status post robotic assisted laparoscopic left adrenalectomy.     3.9 cm left adrenal adenoma with MACS  -POD # 1 status post robotic assisted laparoscopic left adrenalectomy.  -urology following     Post operative Pain  -hydromorphone iv prn  -oxy po prn  -dc pain meds per surgical service    Anemia  -likely post operative, expected  -likely some dilutional effect  -no active bleeding seen  -no transfusion needed at this time  -trend    MARIBEL on CKD  -likely due to some dehydration and hypotension  -ivf  -recheck latter     HTN  -sbp stable--> low BP overnight   -carvedilol--> hold for now for lo wbp  -losartan--> stop due to maribel and low BP     HLD  -rosuvastatin      Asthma  -albuterol prn      JAGRUTI  -cpap     Gout hx  -allopurinol      Quality:  DVT Prophylaxis: scd, lovenox--> stopped  CODE status: Full per chart  Rea:      Plan of care discussed with patient and staff     Dispo: no discharge, possible discharge latter today or tomorrow pending BP, clinical picture and labs     Jerome Hernandez MD  [facility] Hospitalist  [phone]

## 2025-02-14 VITALS
BODY MASS INDEX: 41.2 KG/M2 | DIASTOLIC BLOOD PRESSURE: 60 MMHG | SYSTOLIC BLOOD PRESSURE: 109 MMHG | HEART RATE: 80 BPM | TEMPERATURE: 98 F | OXYGEN SATURATION: 98 % | WEIGHT: 291 LBS | HEIGHT: 70.5 IN | RESPIRATION RATE: 18 BRPM

## 2025-02-14 LAB
ANION GAP SERPL CALC-SCNC: 5 MMOL/L (ref 0–18)
BUN BLD-MCNC: 30 MG/DL (ref 9–23)
CALCIUM BLD-MCNC: 8.9 MG/DL (ref 8.7–10.6)
CHLORIDE SERPL-SCNC: 110 MMOL/L (ref 98–112)
CO2 SERPL-SCNC: 24 MMOL/L (ref 21–32)
CREAT BLD-MCNC: 1.49 MG/DL
EGFRCR SERPLBLD CKD-EPI 2021: 51 ML/MIN/1.73M2 (ref 60–?)
ERYTHROCYTE [DISTWIDTH] IN BLOOD BY AUTOMATED COUNT: 14 %
GLUCOSE BLD-MCNC: 114 MG/DL (ref 70–99)
GLUCOSE BLD-MCNC: 128 MG/DL (ref 70–99)
GLUCOSE BLD-MCNC: 96 MG/DL (ref 70–99)
HCT VFR BLD AUTO: 27.7 %
HGB BLD-MCNC: 8.9 G/DL
MAGNESIUM SERPL-MCNC: 2.2 MG/DL (ref 1.6–2.6)
MCH RBC QN AUTO: 30.7 PG (ref 26–34)
MCHC RBC AUTO-ENTMCNC: 32.1 G/DL (ref 31–37)
MCV RBC AUTO: 95.5 FL
OSMOLALITY SERPL CALC.SUM OF ELEC: 295 MOSM/KG (ref 275–295)
PLATELET # BLD AUTO: 124 10(3)UL (ref 150–450)
POTASSIUM SERPL-SCNC: 4.2 MMOL/L (ref 3.5–5.1)
RBC # BLD AUTO: 2.9 X10(6)UL
SODIUM SERPL-SCNC: 139 MMOL/L (ref 136–145)
WBC # BLD AUTO: 6.5 X10(3) UL (ref 4–11)

## 2025-02-14 PROCEDURE — 83735 ASSAY OF MAGNESIUM: CPT | Performed by: UROLOGY

## 2025-02-14 PROCEDURE — 82962 GLUCOSE BLOOD TEST: CPT

## 2025-02-14 PROCEDURE — 80048 BASIC METABOLIC PNL TOTAL CA: CPT | Performed by: UROLOGY

## 2025-02-14 PROCEDURE — 85027 COMPLETE CBC AUTOMATED: CPT | Performed by: UROLOGY

## 2025-02-14 RX ORDER — HYDROCODONE BITARTRATE AND ACETAMINOPHEN 5; 325 MG/1; MG/1
1 TABLET ORAL EVERY 6 HOURS PRN
Qty: 10 TABLET | Refills: 0 | Status: SHIPPED | OUTPATIENT
Start: 2025-02-14 | End: 2025-03-07

## 2025-02-14 RX ORDER — ASPIRIN 81 MG/1
81 TABLET ORAL DAILY
Status: SHIPPED | COMMUNITY
Start: 2025-02-19

## 2025-02-14 RX ORDER — PSEUDOEPHEDRINE HCL 30 MG
100 TABLET ORAL 2 TIMES DAILY PRN
Qty: 30 CAPSULE | Refills: 0 | Status: SHIPPED | OUTPATIENT
Start: 2025-02-14 | End: 2025-03-07

## 2025-02-14 NOTE — PROGRESS NOTES
[facility]  Urology Progress Note    Leonel Lloyd Patient Status:  Inpatient    1960 MRN [id number]   [facility] 3NW-A Attending Cornell Richards MD   Hosp Day # 2 PCP Judson Leon MD     Subjective:  Leonel Lloyd is a(n) 65 year old male.    S/P robotic-assisted laparoscopic left adrenalectomy 25 with Dr. Richards     Current complaints: Pain controlled.  No nausea, vomiting, fever, or chills. Tolerating diet.  Passing flatus.  No CP, SOB, or calf pain.  Voiding ok.      Objective:  General appearance: alert, appears stated age, and cooperative  Blood pressure 125/69, pulse 77, temperature 98.3 °F (36.8 °C), temperature source Axillary, resp. rate 18, height 5' 10.5\" (1.791 m), weight 291 lb (132 kg), SpO2 98%.  Lungs: non-labored respirations  Abdomen: soft with minimal incisional tenderness.  Incisions C/D/I.  +ecchymosis to left lower abdomen - has extended outside marker line.    Lab Results   Component Value Date    WBC 10.3 2025    HGB 9.7 2025    HCT 29.2 2025    .0 2025    CREATSERUM 1.69 2025    BUN 30 2025     2025    K 4.4 2025     2025    CO2 19.0 2025     2025    CA 8.5 2025    MG 2.0 2025    PGLU 134 2025        Assessment:    3.9 CM LEFT ADRENAL ADENOMA WITH MACS (MILD AUTONOMOUS CORTICAL SECRETION)  S/P robotic-assisted laparoscopic left adrenalectomy 25 with Dr. Richards  Suspect bled from extraction incision from LLQ  Afebrile, VSS  Lactic acid 2.6 > 1.5  No leukocytosis  Hgb 14 > 11.2 > 10.5 > 9.7 (was 14.1 on 10/4/24)  Serum creat 1.41 > 1.85 > 1.69 (was 1.48 on 1/15/25)  Repeat labs to be collected  Pathology pending    Plan:    Encouraged ambulation and use of IS x 10./hr  Lovenox has been DC  Follow labs, temp/vitals  Pain management  Bowel regimen  Monitor ecchymotic area  Hospitalist following  Dr. Richards to see patient later today    Above  discussed with patient.      Liza Burch PA-C  [facility]  Department of Urology  2/14/2025  5:47 AM    Addendum:  Recent Labs   Lab 02/13/25  0157 02/13/25  0606 02/13/25  1416 02/14/25  0614   RBC 3.59* 3.37*  --  2.90*   HGB 11.2* 10.5* 9.7* 8.9*   HCT 34.6* 31.9* 29.2* 27.7*   MCV 96.4 94.7  --  95.5   MCH 31.2 31.2  --  30.7   MCHC 32.4 32.9  --  32.1   RDW 13.7 13.8  --  14.0   NEPRELIM 8.60* 8.35*  --   --    WBC 10.4 10.3  --  6.5   .0 144.0*  --  124.0*       Recent Labs   Lab 02/13/25  0157 02/13/25  0605 02/14/25  0614   * 132* 114*   BUN 29* 30* 30*   CREATSERUM 1.85* 1.69* 1.49*   EGFRCR 39* 44* 51*   CA 8.3* 8.5* 8.9   * 136 139   K 4.8 4.4 4.2    110 110   CO2 19.0* 19.0* 24.0     Lactic acid 2.6 > 1.5  No leukocytosis  Hgb 14 > 11.2 > 10.5 > 9.7 > 8.9 (was 14.1 on 10/4/24)  Serum creat 1.41 > 1.85 > 1.69 > 1.49 (was 1.48 on 1/15/25)    Above discussed with Dr. Richards. Per Dr. Richards - aspirin may be resumed 1 week post-op.    CRYSTAL Hernandez  Urology

## 2025-02-14 NOTE — DISCHARGE INSTRUCTIONS
[facility]  GENERAL UROLOGY POST OPERATIVE INSTRUCTIONS    The time after surgery is one that can be interesting, scary, and full of questions.  Here are some general items to help you navigate the post-operative period.  At any time, should you have any questions, don't hesitate to contact our office for additional assistance.    DIET:  Unless otherwise directed, resume your regular healthy diet.  Return to any medically-directed diets if necessary (renal diet, diabetic diet, etc.).  Drink plenty of fluids.  Most fluids are all right, including small amounts of alcoholic beverages if desired (but not recommended if you are taking prescription pain medication or other medications that you may not use with alcohol ingestion.)    ACTIVITY:  Avoid strenuous physical exercise, including heavy lifting/pushing/pulling (>20 lbs.) and sexual intercourse until released by your doctor.  This is generally a period of four to eight weeks for open or laparoscopic surgical procedures, or 1-2 weeks for outpatient procedures.  Driving is generally okay once pain free and off all prescription pain medications; of course, you may be a passenger for short rides.  Going out to the library, to supper and a movie, or other light activity is even encouraged if you feel well.  Extended travel is not recommended until you are released by your surgeon.  It is okay to go up and down stairs as long as you go slowly.  If you have any surgical clips or sutures, you may be allowed to take routine showers, but should not submerge your incisions in standing water (pool, tub, etc.) for 21 days.  Avoid rubbing or scrubbing the incision(s).  Rather, allow soapy water to pass over the incisions and simply pat them dry.    VOIDING:  Many urology patients will be discharged with a catheter and will need additional instructions (see below); however, for patients without a catheter, please void whenever the urge presents itself.  Do not hold your  urine.  You may be getting up in the middle of the night or urinating more frequently during the daytime after any urologic procedure.  This is normal after many types of surgeries, but a more normal urinary voiding pattern should resume within days, or rarely within a few weeks.  If you are unable to urinate altogether, please phone our nurse for further instructions, or seek attention in an immediate/urgent/or emergent setting.      BLOOD IN THE URINE:  You may have some blood in the urine for two to four weeks after some urologic procedures.  This is usually not serious and a normal part of healing from some urinary surgeries.  Should you have persistent blood, large clots, or the inability to void due to large clots, notify you urology team.    REST:  You should get plenty of rest after any procedure.  However, use your bed as you did prior to your surgery - to take a small nap, and to sleep in the evenings.  Do not lie around in bed all day as this can actually result in post anesthesia complications.  We encourage you to get out of bed daily, take multiple light walks, strolling outdoors if desired.  It is well known that patients that lie or sit all day have more wound complications, at times sever complications from blood clots in the legs, pneumonias, and other challenges.    CONSTIPATION:  Please work to avoid constipation.  We do not recommend enemas or most rectal suppositories after most urologic surgery.  Daily use of Colace or Docusate over-the-counter (100 mg three times daily with 8 oz water) is recommended for the month after surgery - especially if taking prescription pain medication.  Daily prune juice and increased intake of fruits and vegetables are also helpful to prevent acute constipation.  Acute constipation may necessitate the use of over-the-counter Milk of Magnesia - 30 cc every evening until effect - if needed.    MEDICATIONS:  Unless otherwise directed, resume all of your prior home  medications upon discharge.  The exception may be blood thinners (Coumadin, Warfarin, Plavix, Xeralto, etc.) which are typically held for one week prior to, and after, larger urologic surgeries.  Your surgeon will give the recommended times for these medications to be held so that you may work with the nurse or physician tem that manages your anticoagulation medication.  Should you be prescribed and antibiotic please take as directed until completed.    May resume aspirin on 2/19/25    INCENTIVE SPIROMETER/DEEP BREATHING EXERCISES:  You may be provided with an incentive spirometer (IS) breathing exercise machine while in the hospital.  Your nursing staff will educate you on it's use.  This is a very important piece to post anesthesia pneumonia prevention, so take your IS home with you and continue regular use (10x/hour while awake) for the entire time you recover at home prior rot returning to work or regular activities.    FOLLOW UP CARE:  Please call our office at [phone] to coordinate follow up     REPEAT LABS ON MONDAY 2/17/25 AT [facility] (HEMOGLOBIN/HEMATOCRIT, BMP)    NOTIFY OUR OFFICE OF:  - Temperature greater than 101 degrees.  - Any questions you think are important for your urology team.  - You have challenges with catheter management.    SEEK EMERGENCY CARE with sudden onset of shortness of breath, chest pain, increasing calf or leg pain, or acute condition that you feel needs emergent attention.    If blood pressure greater then 130 systolic, ok to resume carvedilol.     Please follow up with Cardiology or primary care doctor next week to address restarting losartan, torsemide and spironolactone

## 2025-02-14 NOTE — PROGRESS NOTES
Alert & oriented x4.With tolerable pain.Passing flatus .tolerated diet.Up in room . Use of I.S and ambulation in hallway encouraged.With lap.sites x 5 with skin glue c/d/I.LLQ incision site bruised ,slightly firm to touch -- and Yossi PA aware.Plan of care discussed with patient . All questions and concerns addressed.

## 2025-02-14 NOTE — PROGRESS NOTES
NURSING DISCHARGE NOTE    Discharged Home via Wheelchair.  Accompanied by Spouse and Support staff  Belongings Taken by patient/family.    VErbal and written discharge instructions given to patient.Verbalized understanding .With tolerable pain. To home in stable condition.

## 2025-02-14 NOTE — PLAN OF CARE
Alert x 4. VSS on RA. No complaints of pain. Abdominal incisions intact with some bruising to LLQ. Ambulating with minimal assist. Tele in place, SR. CPAP at night. POC discussed. All current needs met Call light in reach.

## 2025-02-14 NOTE — PROGRESS NOTES
[facility] Hospitalist Progress Note                                                                     [facility]   part of [facility]      Leonel Lloyd  1/5/1960    SUBJECTIVE: No chest pain, palpitations, shortness of breath, cough, nausea, vomiting. Abdominal pain controlled.     OBJECTIVE:  Temp:  [97.6 °F (36.4 °C)-98.3 °F (36.8 °C)] 98.3 °F (36.8 °C)  Pulse:  [63-84] 77  Resp:  [17-18] 18  BP: (104-125)/(52-69) 125/69  SpO2:  [98 %-99 %] 98 %  Exam  Gen: No acute distress, alert and oriented  Pulm: Lungs clear bilaterally, normal respiratory effort, no crackles, no wheezing  CV: Heart with regular rate and rhythm, no murmur.   Abd: Abdomen soft, nontender, nondistended, bowel sounds present, mild bruising at surgical site -slight extension today   MSK: no significant pitting edema or tenderness of the LE  Skin: no new  rashes or lesions, bruising at the surgical site    Labs:   Recent Labs   Lab 02/12/25  1154 02/13/25  0157 02/13/25  0606 02/13/25  1416   WBC 8.7 10.4 10.3  --    HGB 14.0 11.2* 10.5* 9.7*   MCV 94.2 96.4 94.7  --    .0 167.0 144.0*  --        Recent Labs   Lab 02/12/25  1154 02/13/25  0157 02/13/25  0605    132* 136   K 4.8 4.8 4.4    109 110   CO2 21.0 19.0* 19.0*   BUN 28* 29* 30*   CREATSERUM 1.41* 1.85* 1.69*   CA 8.7 8.3* 8.5*   MG  --   --  2.0   * 121* 132*       No results for input(s): \"ALT\", \"AST\", \"ALB\", \"AMYLASE\", \"LIPASE\", \"LDH\" in the last 168 hours.    Invalid input(s): \"ALPHOS\", \"TBIL\", \"DBIL\", \"TPROT\"    Recent Labs   Lab 02/12/25 2054 02/13/25  0542 02/13/25  1145 02/13/25  1636 02/13/25 2105   PGLU 207* 132* 200* 106* 134*       Meds:   Scheduled:    allopurinol  200 mg Oral Daily    buPROPion ER  150 mg Oral Daily    montelukast  10 mg Oral Daily    topiramate  25 mg Oral BID    rosuvastatin  20 mg Oral Nightly    sennosides  17.2 mg Oral Nightly    docusate sodium  100 mg Oral BID     insulin aspart  1-10 Units Subcutaneous TID AC and HS     Continuous Infusions:    sodium chloride 75 mL/hr at 02/13/25 1133     PRN:   acetaminophen    albuterol    polyethylene glycol (PEG 3350)    ondansetron    metoclopramide    oxyCODONE **OR** oxyCODONE    HYDROmorphone **OR** HYDROmorphone    melatonin    benzocaine-menthol    tiZANidine    ASSESSMENT / PLAN:   65 year old male with history of allergic rhinitis, asthma, BPH, skin cancer, GERD, hypertension, obstructive sleep apnea presenting with 3.9 cm left adrenal adenoma with MAC S status post robotic assisted laparoscopic left adrenalectomy.     3.9 cm left adrenal adenoma with MACS  -POD # 2 status post robotic assisted laparoscopic left adrenalectomy.  -urology following     Post operative Pain  -hydromorphone iv prn  -oxy po prn  -dc pain meds per surgical service    Anemia  -likely post operative, expected  -likely some dilutional effect  -no active bleeding seen  -no transfusion needed at this time  -trend    MARIBEL on CKD--> improved  -likely due to some dehydration and hypotension  -ivf  -recheck latter     HTN  -sbp stable--> low BP overnight   -carvedilol--> hold for now for borderline bp --> on dc if SBP> 130 at home can resume  -losartan, torsemide and aldactone --> stop due to maribel and low BP--> follow up with PMD to discuss when to resume next week     HLD  -rosuvastatin      Asthma  -albuterol prn      JAGRUTI  -cpap     Gout hx  -allopurinol      Quality:  DVT Prophylaxis: scd, lovenox--> stopped  CODE status: Full per chart  Rea:      Plan of care discussed with patient and staff     Dispo: medically stable for discharge     Jerome Hernandez MD  [facility] Hospitalist  [phone]

## 2025-02-17 NOTE — DISCHARGE SUMMARY
[facility]  Discharge Summary    Leonel Lloyd Patient Status:  Inpatient    1960 MRN [id number]   Location [facility] 3NW-A Attending No att. providers found   Hosp Day # 2 PCP Judson Leon MD         Admit date: 2025    Discharge date and time: 2025  4:45 PM     Admitting Physician: Cornell Richards MD     Discharge Physician: Cornell Richards    Admission Diagnoses: LESION OF ADRENAL GLAND  Pre-op testing  Left adrenal mass (HCC)    Discharge Diagnoses: Same    Admission Condition: good    Discharged Condition: good    Indication for Admission: Left adrenal mass with elevated cortisol (MACS)    Hospital Course: Admitted on  and underwent robotic assisted left adrenalectomy.  Hypotension noted on night of POD 1.  He developed an abdominal wall hematoma at the extraction site in the LLQ. This led to acute blood loss anemia. His hemodynamic stabilized.  He was discharged to home on  in stable condition.  He will repeat labs on Monday, .    Consults: Hospitalist    Significant Diagnostic Studies: ...    Treatments: surgery: Left adrenalectomy.    Discharge Exam:  See progress note from day of discharge.     Disposition: Home or Self Care w/ Planned Readmission    Patient Instructions:      Discharge Medications        START taking these medications        Instructions Prescription details   docusate sodium 100 MG Caps  Commonly known as: COLACE      Take 100 mg by mouth 2 (two) times daily as needed for constipation.   Quantity: 30 capsule  Refills: 0     HYDROcodone-acetaminophen 5-325 MG Tabs  Commonly known as: Norco  Notes to patient: *Don't take with Tylenol or Tylenol containing products'  *Don't exceed 4000 mg od Acetaminophen in 24 hrs  *Don't drive or drink alcohol if taking Norco  *Take with food       Take 1 tablet by mouth every 6 (six) hours as needed.   Quantity: 10 tablet  Refills: 0            CHANGE how you take these medications        Instructions Prescription  details   aspirin 81 MG Tbec  Start taking on: February 19, 2025  What changed:   additional instructions  These instructions start on February 19, 2025. If you are unsure what to do until then, ask your doctor or other care provider.      Take 1 tablet (81 mg total) by mouth daily. May resume 2/19/25   Refills: 0            CONTINUE taking these medications        Instructions Prescription details   albuterol (2.5 MG/3ML) 0.083% Nebu  Commonly known as: Ventolin      Take 3 mL (2.5 mg total) by nebulization every 4 (four) hours as needed for Wheezing or Shortness of Breath.   Quantity: 3 Box  Refills: 0     albuterol 108 (90 Base) MCG/ACT Aers  Commonly known as: Ventolin HFA      INHALE 2 PUFFS INTO THE LUNGS EVERY 4 TO 6 HOURS AS NEEDED FOR WHEEZING   Quantity: 20.1 each  Refills: 0     allopurinol 100 MG Tabs  Commonly known as: Zyloprim      TAKE 2 TABLETS BY MOUTH EVERY DAY   Quantity: 180 tablet  Refills: 0     Azelaic Acid 15 % Gel  Commonly known as: Finacea      Apply to face daily   Quantity: 50 g  Refills: 3     buPROPion  MG Tb24  Commonly known as: Wellbutrin XL      Take 1 tablet (150 mg total) by mouth daily.   Refills: 0     colchicine 0.6 MG Tabs      1 tablet at start of gout attack . May repeat in 4-6 hours. No greater than 2 pills daily   Quantity: 30 Tab  Refills: 4     diphenhydrAMINE 25 MG Caps  Commonly known as: Benadryl      Take 1 capsule (25 mg total) by mouth as needed for Allergies.   Refills: 0     EPINEPHrine 0.3 MG/0.3ML Soaj  Commonly known as: EpiPen 2-Alan      Use as directed, for anaphylaxis, call 911   Quantity: 2 each  Refills: 0     ferrous sulfate 325 (65 FE) MG Tbec      Take 1 tablet (325 mg total) by mouth daily with breakfast.   Refills: 0     GLUCOSAMINE OR      Take 2 tablets by mouth daily.   Refills: 0     metFORMIN 500 MG Tabs  Commonly known as: Glucophage      Take 3 tablets (1,500 mg total) by mouth at bedtime.   Refills: 0     montelukast 10 MG  Tabs  Commonly known as: Singulair      TAKE 1 TABLET BY MOUTH EVERY DAY   Quantity: 90 tablet  Refills: 0     Optisource Post Bariatric Surg Chew      Chew 2 tablets by mouth daily.   Refills: 0     rosuvastatin 20 MG Tabs  Commonly known as: Crestor      Take 1 tablet (20 mg total) by mouth nightly.   Refills: 0     topiramate 50 MG Tabs  Commonly known as: TOPAMAX      Take 0.5 tablets (25 mg total) by mouth 2 (two) times daily.   Refills: 0     VITAMIN D3 OR      Take 1 tablet by mouth daily.   Refills: 0     Zinc 50 MG Tabs      Take 1 tablet by mouth daily.   Refills: 0            STOP taking these medications      carvedilol 25 MG Tabs  Commonly known as: Coreg        losartan 100 MG Tabs  Commonly known as: Cozaar        spironolactone 25 MG Tabs  Commonly known as: Aldactone        torsemide 20 MG Tabs  Commonly known as: Demadex                  Where to Get Your Medications        These medications were sent to [facility]/pharmacy #[id number] - [facility] [address] [facility] AT [facility], [phone], [phone] [address] [place] [address]      Phone: [phone]   docusate sodium 100 MG Caps  HYDROcodone-acetaminophen 5-325 MG Tabs       Activity: activity as tolerated  Diet: regular diet  Wound Care: keep wound clean and dry    Follow-up with Cornell Richards in 2 weeks.    Signed:  Cornell Richards MD  2/17/2025  12:28 PM

## 2025-03-05 ENCOUNTER — HOSPITAL ENCOUNTER (INPATIENT)
Facility: HOSPITAL | Age: 65
LOS: 2 days | Discharge: HOME | End: 2025-03-07
Attending: EMERGENCY MEDICINE | Admitting: HOSPITALIST
Payer: MEDICARE

## 2025-03-05 ENCOUNTER — APPOINTMENT (OUTPATIENT)
Dept: CT IMAGING | Facility: HOSPITAL | Age: 65
End: 2025-03-05
Attending: EMERGENCY MEDICINE
Payer: MEDICARE

## 2025-03-05 DIAGNOSIS — E89.6 HISTORY OF TOTAL ADRENALECTOMY (HCC): ICD-10-CM

## 2025-03-05 DIAGNOSIS — N39.0 SEPSIS SECONDARY TO UTI (HCC): Primary | ICD-10-CM

## 2025-03-05 DIAGNOSIS — A41.9 SEPSIS SECONDARY TO UTI (HCC): Primary | ICD-10-CM

## 2025-03-05 LAB
ALBUMIN SERPL-MCNC: 4.8 G/DL (ref 3.2–4.8)
ALBUMIN/GLOB SERPL: 2.1 {RATIO} (ref 1–2)
ALP LIVER SERPL-CCNC: 77 U/L
ALT SERPL-CCNC: 12 U/L
ANION GAP SERPL CALC-SCNC: 9 MMOL/L (ref 0–18)
AST SERPL-CCNC: 9 U/L (ref ?–34)
BASOPHILS # BLD AUTO: 0.07 X10(3) UL (ref 0–0.2)
BASOPHILS NFR BLD AUTO: 0.5 %
BILIRUB SERPL-MCNC: 0.9 MG/DL (ref 0.2–1.1)
BILIRUB UR QL STRIP.AUTO: NEGATIVE
BUN BLD-MCNC: 16 MG/DL (ref 9–23)
CALCIUM BLD-MCNC: 10.1 MG/DL (ref 8.7–10.6)
CHLORIDE SERPL-SCNC: 106 MMOL/L (ref 98–112)
CO2 SERPL-SCNC: 24 MMOL/L (ref 21–32)
COLOR UR AUTO: YELLOW
CREAT BLD-MCNC: 1.66 MG/DL
EGFRCR SERPLBLD CKD-EPI 2021: 45 ML/MIN/1.73M2 (ref 60–?)
EOSINOPHIL # BLD AUTO: 0.04 X10(3) UL (ref 0–0.7)
EOSINOPHIL NFR BLD AUTO: 0.3 %
ERYTHROCYTE [DISTWIDTH] IN BLOOD BY AUTOMATED COUNT: 13.8 %
GLOBULIN PLAS-MCNC: 2.3 G/DL (ref 2–3.5)
GLUCOSE BLD-MCNC: 107 MG/DL (ref 70–99)
GLUCOSE BLD-MCNC: 114 MG/DL (ref 70–99)
GLUCOSE UR STRIP.AUTO-MCNC: NORMAL MG/DL
GRAN CASTS #/AREA URNS LPF: PRESENT /LPF
HCT VFR BLD AUTO: 38 %
HGB BLD-MCNC: 12.6 G/DL
IMM GRANULOCYTES # BLD AUTO: 0.09 X10(3) UL (ref 0–1)
IMM GRANULOCYTES NFR BLD: 0.6 %
KETONES UR STRIP.AUTO-MCNC: NEGATIVE MG/DL
LACTATE SERPL-SCNC: 1 MMOL/L (ref 0.5–2)
LEUKOCYTE ESTERASE UR QL STRIP.AUTO: 500
LYMPHOCYTES # BLD AUTO: 1.01 X10(3) UL (ref 1–4)
LYMPHOCYTES NFR BLD AUTO: 6.5 %
MCH RBC QN AUTO: 30.2 PG (ref 26–34)
MCHC RBC AUTO-ENTMCNC: 33.2 G/DL (ref 31–37)
MCV RBC AUTO: 91.1 FL
MONOCYTES # BLD AUTO: 1.13 X10(3) UL (ref 0.1–1)
MONOCYTES NFR BLD AUTO: 7.3 %
NEUTROPHILS # BLD AUTO: 13.12 X10 (3) UL (ref 1.5–7.7)
NEUTROPHILS # BLD AUTO: 13.12 X10(3) UL (ref 1.5–7.7)
NEUTROPHILS NFR BLD AUTO: 84.8 %
NITRITE UR QL STRIP.AUTO: NEGATIVE
OSMOLALITY SERPL CALC.SUM OF ELEC: 290 MOSM/KG (ref 275–295)
PH UR STRIP.AUTO: 6 [PH] (ref 5–8)
PLATELET # BLD AUTO: 184 10(3)UL (ref 150–450)
POTASSIUM SERPL-SCNC: 4 MMOL/L (ref 3.5–5.1)
PROT SERPL-MCNC: 7.1 G/DL (ref 5.7–8.2)
PROT UR STRIP.AUTO-MCNC: 100 MG/DL
RBC # BLD AUTO: 4.17 X10(6)UL
RBC #/AREA URNS AUTO: >10 /HPF
SODIUM SERPL-SCNC: 139 MMOL/L (ref 136–145)
SP GR UR STRIP.AUTO: 1.02 (ref 1–1.03)
UROBILINOGEN UR STRIP.AUTO-MCNC: NORMAL MG/DL
WBC # BLD AUTO: 15.5 X10(3) UL (ref 4–11)
WBC #/AREA URNS AUTO: >50 /HPF
WBC CLUMPS UR QL AUTO: PRESENT /HPF

## 2025-03-05 PROCEDURE — 87088 URINE BACTERIA CULTURE: CPT | Performed by: EMERGENCY MEDICINE

## 2025-03-05 PROCEDURE — 83605 ASSAY OF LACTIC ACID: CPT | Performed by: EMERGENCY MEDICINE

## 2025-03-05 PROCEDURE — 82962 GLUCOSE BLOOD TEST: CPT

## 2025-03-05 PROCEDURE — 87086 URINE CULTURE/COLONY COUNT: CPT | Performed by: EMERGENCY MEDICINE

## 2025-03-05 PROCEDURE — 87040 BLOOD CULTURE FOR BACTERIA: CPT | Performed by: EMERGENCY MEDICINE

## 2025-03-05 PROCEDURE — 36415 COLL VENOUS BLD VENIPUNCTURE: CPT

## 2025-03-05 PROCEDURE — 96365 THER/PROPH/DIAG IV INF INIT: CPT

## 2025-03-05 PROCEDURE — 80053 COMPREHEN METABOLIC PANEL: CPT

## 2025-03-05 PROCEDURE — 87186 SC STD MICRODIL/AGAR DIL: CPT | Performed by: EMERGENCY MEDICINE

## 2025-03-05 PROCEDURE — 99285 EMERGENCY DEPT VISIT HI MDM: CPT

## 2025-03-05 PROCEDURE — 74176 CT ABD & PELVIS W/O CONTRAST: CPT | Performed by: EMERGENCY MEDICINE

## 2025-03-05 PROCEDURE — 80053 COMPREHEN METABOLIC PANEL: CPT | Performed by: EMERGENCY MEDICINE

## 2025-03-05 PROCEDURE — 85025 COMPLETE CBC W/AUTO DIFF WBC: CPT | Performed by: EMERGENCY MEDICINE

## 2025-03-05 PROCEDURE — 81001 URINALYSIS AUTO W/SCOPE: CPT | Performed by: EMERGENCY MEDICINE

## 2025-03-05 PROCEDURE — 85025 COMPLETE CBC W/AUTO DIFF WBC: CPT

## 2025-03-05 PROCEDURE — 81001 URINALYSIS AUTO W/SCOPE: CPT

## 2025-03-05 RX ORDER — CARVEDILOL 25 MG/1
25 TABLET ORAL 2 TIMES DAILY WITH MEALS
COMMUNITY

## 2025-03-05 RX ORDER — HYDROCORTISONE 10 MG/1
10 TABLET ORAL DAILY
Status: DISCONTINUED | OUTPATIENT
Start: 2025-03-05 | End: 2025-03-05

## 2025-03-05 RX ORDER — HYDROCORTISONE 10 MG/1
10 TABLET ORAL DAILY
COMMUNITY

## 2025-03-05 RX ORDER — HYDROCORTISONE 10 MG/1
10 TABLET ORAL 2 TIMES DAILY
Status: DISCONTINUED | OUTPATIENT
Start: 2025-03-06 | End: 2025-03-05

## 2025-03-05 RX ORDER — HYDROCORTISONE 10 MG/1
20 TABLET ORAL DAILY
Status: DISCONTINUED | OUTPATIENT
Start: 2025-03-05 | End: 2025-03-07

## 2025-03-05 NOTE — ED PROVIDER NOTES
Patient Seen in: [facility] Emergency Department      History     Chief Complaint   Patient presents with    Fatigue           Urinary Symptoms     Stated Complaint: fatgiue    Subjective:     HPI  65-year-old man with asthma who had an adrenalectomy 2 weeks ago who reports experiencing pain over the bladder area, with tenderness noted. There has been a history of diarrhea over the last couple of weeks, and a significant decrease in appetite. No vomiting has been reported.  His wife reports that he is prone to urinary tract infections and has an enlarged prostate.  The urinary catheter catheter was removed immediately after the adrenalectomy.    Objective:   Past Medical History:    Allergic rhinitis, cause unspecified    Anesthesia complication    high then low B/P after    Asthma (HCC)    Back problem    SCOLIOSIS, AND REPAIRED HERNIATED DISC    BPH (benign prostatic hyperplasia)    Cancer ([facility])    basal cell skin    Complete tear of right rotator cuff    10.2018 mri    COVID    head cold,lost sense o taste 1 day, fatigue. no hospitalization    COVID-19    cough, body aches, loss of taste    Difficult intubation    difficult airway per Dr. Mann--see Airway audit link    Difficult intubation    difficult airway per Dr. Box--see Airway audit link    Diverticulosis of large intestine    Elevated PSA    neg prostate biopsy 2012    Esophageal reflux    Extrinsic asthma, unspecified    ALLERGY INDUCED  ASTHMA    High blood pressure    History of stomach ulcers    Insulin resistance    Nystagmus    SINCE CHILDHOOD    OBESITY    Gastric bypass 2006    JAGRUTI (obstructive sleep apnea)    AHI 15 Sao2 sridevi 80% CPAP 8 [employer]    Osteoarthritis    Postconcussion syndrome    Rosacea    Sleep apnea    cpap    Unspecified essential hypertension    Visual impairment    glasses              Past Surgical History:   Procedure Laterality Date    Arthroscopy of joint unlisted Bilateral     SHOULDER REPLACEMENT    Colonoscopy   08/14/2013    Procedure: COLONOSCOPY;  Surgeon: Marshall Garner MD;  Location:  ENDOSCOPY    Colonoscopy N/A 07/16/2015    Procedure: COLONOSCOPY;  Surgeon: Marshall Garner MD;  Location:  ENDOSCOPY    Colonoscopy N/A 07/03/2018    Procedure: COLONOSCOPY, POSSIBLE BIOPSY, POSSIBLE POLYPECTOMY 93633;  Surgeon: Marshall Garner MD;  Location: [facility]    Colonoscopy N/A 07/13/2021    Procedure: COLONOSCOPY;  Surgeon: Tim Ramírez MD;  Location:  ENDOSCOPY    Debride mastoid cavity,simple      Ear cartilage graft to face  01/11/2011    Performed by RERE POLLARD at [facility], [facility]    Exc shoulder les sc > 3 cm Right 07/02/2014    Procedure: EXCISION OF ARM MASS;  Surgeon: Ar Ortiz MD;  Location: [facility]    Excision turbinate,submucous  06/23/2010    Performed by RIOS LIND at [facility], [facility]    Excision turbinate,submucous  06/23/2010    Performed by RIOS LIND at [facility]    Gastric bypass,obesity,sb reconstruc  2007    Mastoidectomy,simple      mastoid surgery    Other Left     shoulder    Other surgical history  10/12/2012    Prostate Biopsy - Dr. Kovacs     Reconstr total shoulder implant Bilateral     Right reverse shoulder arthroplasty     Remv cartilage for graft nasal  01/11/2011    Performed by RERE POLLARD at [facility], [facility]    Repair nasal stenosis  01/11/2011    Performed by RERE POLLARD at [facility]    Repair of nasal septum  06/23/2010    Performed by RIOS LIND at [facility]    Skin surgery  09/05/2008    Scar to L shoulder s/p excision BCC 9/5/08    Spine surgery procedure unlisted      lumbar    Total knee replacement Bilateral                 Social History     Socioeconomic History    Marital status:    Tobacco Use    Smoking status: Former     Current packs/day: 0.00     Average packs/day: 1.5 packs/day for 20.0 years (30.0 ttl pk-yrs)     Types: Cigarettes      Start date: 1978     Quit date: 1998     Years since quittin.7    Smokeless tobacco: Never   Vaping Use    Vaping status: Never Used   Substance and Sexual Activity    Alcohol use: Yes     Comment: occasional    Drug use: No    Sexual activity: Yes     Partners: Female   Social History Narrative     - . Wife has ureteral stenosis and bicuspid valve and is s/p valve replacement and scheduled for elective ureteral repair with dr hui- . 1s- 25 [employer] employee. 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 He walks for exercise daily.      Social Drivers of Health     Food Insecurity: No Food Insecurity (2025)    NCSS - Food Insecurity     Worried About Running Out of Food in the Last Year: No     Ran Out of Food in the Last Year: No   Transportation Needs: No Transportation Needs (2025)    NCSS - Transportation     Lack of Transportation: No   Housing Stability: Not At Risk (2025)    NCSS - Housing/Utilities     Has Housing: Yes     Worried About Losing Housing: No     Unable to Get Utilities: No              Review of Systems    Positive for stated complaint: fatgiue  Other systems are as noted in HPI.  Constitutional and vital signs reviewed.      All other systems reviewed and negative except as noted above.    Physical Exam     ED Triage Vitals   BP 25 1227 114/73   Pulse 25 1227 104   Resp 25 1227 18   Temp 25 1227 97.2 °F (36.2 °C)   Temp src 25 1227 Oral   SpO2 25 1227 95 %   O2 Device 25 1615 None (Room air)       Current:/72   Pulse 97   Temp 97.2 °F (36.2 °C) (Oral)   Resp 18   SpO2 100%       General:  Vitals as listed.  No acute distress   HEENT: Sclerae anicteric.  Conjunctivae show no pallor.  Oropharynx clear, mucous membranes moist   Lungs: good air exchange    Abdomen: Suprapubic tenderness.  Ecchymosis around  surgical sites.  No abdominal masses.  No peritoneal signs   Extremities: no edema, normal peripheral pulses   Neuro: Alert oriented and nonfocal      ED Course     Labs Reviewed   URINALYSIS WITH CULTURE REFLEX - Abnormal; Notable for the following components:       Result Value    Clarity Urine Turbid (*)     Blood Urine 2+ (*)     Protein Urine 100 (*)     Leukocyte Esterase Urine 500 (*)     WBC Urine >50 (*)     RBC Urine >10 (*)     Bacteria Urine Rare (*)     Granular Casts Present (*)     WBC Clump Present (*)     All other components within normal limits   CBC WITH DIFFERENTIAL WITH PLATELET - Abnormal; Notable for the following components:    WBC 15.5 (*)     HGB 12.6 (*)     HCT 38.0 (*)     Neutrophil Absolute Prelim 13.12 (*)     Neutrophil Absolute 13.12 (*)     Monocyte Absolute 1.13 (*)     All other components within normal limits   COMP METABOLIC PANEL (14) - Abnormal; Notable for the following components:    Glucose 107 (*)     Creatinine 1.66 (*)     eGFR-Cr 45 (*)     A/G Ratio 2.1 (*)     All other components within normal limits   LACTIC ACID, PLASMA - Normal   RAINBOW DRAW LAVENDER   RAINBOW DRAW LIGHT GREEN   URINE CULTURE, ROUTINE   BLOOD CULTURE   BLOOD CULTURE   C. DIFFICILE(TOXIGENIC)PCR     CT ABDOMEN+PELVIS KIDNEYSTONE 2D RNDR(NO IV,NO ORAL)(CPT=74176)    Result Date: 3/5/2025  CONCLUSION:  1. There has been a left adrenalectomy. 2. Left upper quadrant abdominal wall hematoma is most likely related to 1 of the laparoscopic ports for the recent adrenalectomy. 3. Extensive stranding around bladder consistent with cystitis. 4. Moderate enlargement of prostate. 5. There is diverticulosis of the colon without CT evidence of diverticulitis.     LOCATION:     Dictated by (CST): Cornell Zheng MD on 3/05/2025 at 7:02 PM     Finalized by (CST): Cornell Zheng MD on 3/05/2025 at 7:10 PM        ED COURSE and MDM     Sources of the medical history included the patient and his  wife    Differential diagnosis before testing included cystitis and severe sepsis, a medical condition that poses a threat to life.    I reviewed prior external notes including discharge summary written by Dr. Richards, urology, regarding admission on 2/12/2025 for adrenal lesion, status post left adrenalectomy.    Hydrated with normal saline.  Given ceftriaxone after blood and urine cultures sent    [facility] hospitalist notified.    [facility] urologist paged.    I have discussed with the patient the results of testing, differential diagnosis, and treatment plan. They expressed clear understanding of these instructions and agrees to the plan provided.    Disposition and Plan     Clinical Impression:  1. Sepsis secondary to UTI (HCC)    2. History of total adrenalectomy (HCC)         Disposition:  Admit  3/5/2025  7:14 pm    Follow-up:  No follow-up provider specified.      Medications Prescribed:  Current Discharge Medication List

## 2025-03-06 LAB
ANION GAP SERPL CALC-SCNC: 13 MMOL/L (ref 0–18)
BUN BLD-MCNC: 19 MG/DL (ref 9–23)
C DIFF TOX B STL QL: NEGATIVE
CALCIUM BLD-MCNC: 9.5 MG/DL (ref 8.7–10.6)
CHLORIDE SERPL-SCNC: 107 MMOL/L (ref 98–112)
CO2 SERPL-SCNC: 19 MMOL/L (ref 21–32)
CREAT BLD-MCNC: 1.77 MG/DL
EGFRCR SERPLBLD CKD-EPI 2021: 42 ML/MIN/1.73M2 (ref 60–?)
ERYTHROCYTE [DISTWIDTH] IN BLOOD BY AUTOMATED COUNT: 13.8 %
GLUCOSE BLD-MCNC: 116 MG/DL (ref 70–99)
GLUCOSE BLD-MCNC: 119 MG/DL (ref 70–99)
HCT VFR BLD AUTO: 35.8 %
HGB BLD-MCNC: 11.9 G/DL
MAGNESIUM SERPL-MCNC: 1.7 MG/DL (ref 1.6–2.6)
MCH RBC QN AUTO: 30.3 PG (ref 26–34)
MCHC RBC AUTO-ENTMCNC: 33.2 G/DL (ref 31–37)
MCV RBC AUTO: 91.1 FL
OSMOLALITY SERPL CALC.SUM OF ELEC: 291 MOSM/KG (ref 275–295)
PLATELET # BLD AUTO: 179 10(3)UL (ref 150–450)
POTASSIUM SERPL-SCNC: 3.8 MMOL/L (ref 3.5–5.1)
RBC # BLD AUTO: 3.93 X10(6)UL
SODIUM SERPL-SCNC: 139 MMOL/L (ref 136–145)
WBC # BLD AUTO: 16.4 X10(3) UL (ref 4–11)

## 2025-03-06 PROCEDURE — 83735 ASSAY OF MAGNESIUM: CPT | Performed by: HOSPITALIST

## 2025-03-06 PROCEDURE — 87493 C DIFF AMPLIFIED PROBE: CPT | Performed by: HOSPITALIST

## 2025-03-06 PROCEDURE — 80048 BASIC METABOLIC PNL TOTAL CA: CPT | Performed by: HOSPITALIST

## 2025-03-06 PROCEDURE — 94660 CPAP INITIATION&MGMT: CPT

## 2025-03-06 PROCEDURE — 85027 COMPLETE CBC AUTOMATED: CPT | Performed by: HOSPITALIST

## 2025-03-06 PROCEDURE — 82962 GLUCOSE BLOOD TEST: CPT

## 2025-03-06 RX ORDER — HYDROMORPHONE HYDROCHLORIDE 1 MG/ML
0.5 INJECTION, SOLUTION INTRAMUSCULAR; INTRAVENOUS; SUBCUTANEOUS EVERY 30 MIN PRN
Status: DISCONTINUED | OUTPATIENT
Start: 2025-03-06 | End: 2025-03-06

## 2025-03-06 RX ORDER — TAMSULOSIN HYDROCHLORIDE 0.4 MG/1
0.4 CAPSULE ORAL NIGHTLY
Status: DISCONTINUED | OUTPATIENT
Start: 2025-03-06 | End: 2025-03-07

## 2025-03-06 RX ORDER — HEPARIN SODIUM 5000 [USP'U]/ML
5000 INJECTION, SOLUTION INTRAVENOUS; SUBCUTANEOUS EVERY 8 HOURS SCHEDULED
Status: DISCONTINUED | OUTPATIENT
Start: 2025-03-06 | End: 2025-03-07

## 2025-03-06 RX ORDER — MAGNESIUM OXIDE 400 MG/1
400 TABLET ORAL ONCE
Status: COMPLETED | OUTPATIENT
Start: 2025-03-06 | End: 2025-03-06

## 2025-03-06 RX ORDER — ROSUVASTATIN CALCIUM 10 MG/1
10 TABLET, COATED ORAL NIGHTLY
Status: DISCONTINUED | OUTPATIENT
Start: 2025-03-06 | End: 2025-03-07

## 2025-03-06 RX ORDER — SODIUM CHLORIDE, SODIUM LACTATE, POTASSIUM CHLORIDE, CALCIUM CHLORIDE 600; 310; 30; 20 MG/100ML; MG/100ML; MG/100ML; MG/100ML
INJECTION, SOLUTION INTRAVENOUS CONTINUOUS
Status: DISCONTINUED | OUTPATIENT
Start: 2025-03-06 | End: 2025-03-07

## 2025-03-06 RX ORDER — SODIUM CHLORIDE 9 MG/ML
INJECTION, SOLUTION INTRAVENOUS ONCE
Status: COMPLETED | OUTPATIENT
Start: 2025-03-06 | End: 2025-03-06

## 2025-03-06 RX ORDER — ACETAMINOPHEN 500 MG
500 TABLET ORAL EVERY 4 HOURS PRN
Status: DISCONTINUED | OUTPATIENT
Start: 2025-03-06 | End: 2025-03-07

## 2025-03-06 RX ORDER — POTASSIUM CHLORIDE 1500 MG/1
40 TABLET, EXTENDED RELEASE ORAL ONCE
Status: COMPLETED | OUTPATIENT
Start: 2025-03-06 | End: 2025-03-06

## 2025-03-06 RX ORDER — BUPROPION HYDROCHLORIDE 150 MG/1
150 TABLET ORAL DAILY
Status: DISCONTINUED | OUTPATIENT
Start: 2025-03-06 | End: 2025-03-07

## 2025-03-06 RX ORDER — ONDANSETRON 2 MG/ML
4 INJECTION INTRAMUSCULAR; INTRAVENOUS EVERY 4 HOURS PRN
Status: DISCONTINUED | OUTPATIENT
Start: 2025-03-06 | End: 2025-03-06

## 2025-03-06 RX ORDER — TOPIRAMATE 25 MG/1
25 TABLET, FILM COATED ORAL 2 TIMES DAILY
Status: DISCONTINUED | OUTPATIENT
Start: 2025-03-06 | End: 2025-03-07

## 2025-03-06 RX ORDER — ALBUTEROL SULFATE 90 UG/1
2 INHALANT RESPIRATORY (INHALATION) EVERY 6 HOURS PRN
Status: DISCONTINUED | OUTPATIENT
Start: 2025-03-06 | End: 2025-03-07

## 2025-03-06 RX ORDER — ALLOPURINOL 100 MG/1
200 TABLET ORAL DAILY
Status: DISCONTINUED | OUTPATIENT
Start: 2025-03-06 | End: 2025-03-07

## 2025-03-06 RX ORDER — SODIUM CHLORIDE 9 MG/ML
INJECTION, SOLUTION INTRAVENOUS CONTINUOUS
Status: ACTIVE | OUTPATIENT
Start: 2025-03-06 | End: 2025-03-06

## 2025-03-06 NOTE — PLAN OF CARE
Patient resting in bed. Tolerated breakfast. Denies any bowel movements for 2 days. Denies chest/calf pain. Abdomen firm/rounded. Old lap sites intact, bruising noted to lower abdomin. POC discussed with patient, all questions and concerns addressed. All safety measures in place.

## 2025-03-06 NOTE — H&P
[facility] H&P/Consult note       CC:   Chief Complaint   Patient presents with    Fatigue           Urinary Symptoms        PCP: Judson Leon MD    History of Present Illness: Patient is a 65 year old male with PMH sig for asthma, htn, hld, recent admissoin for left adrenalectomy complicated by abla / hematoma, here for fatigue, weakness, dysuria.   Urinary symptoms started yesterday, no f/c.   Has been also feeling fatigues, no appetite, has had diarrhea, no bleeding. No n/v. Has been going on sine the surgery      PMH  Past Medical History:    Allergic rhinitis, cause unspecified    Anesthesia complication    high then low B/P after    Asthma (HCC)    Back problem    SCOLIOSIS, AND REPAIRED HERNIATED DISC    BPH (benign prostatic hyperplasia)    Cancer (HCC)    basal cell skin    Complete tear of right rotator cuff    10.2018 mri    COVID    head cold,lost sense o taste 1 day, fatigue. no hospitalization    COVID-19    cough, body aches, loss of taste    Difficult intubation    difficult airway per Dr. Mann--see Airway audit link    Difficult intubation    difficult airway per Dr. Box--see Airway audit link    Diverticulosis of large intestine    Elevated PSA    neg prostate biopsy 2012    Esophageal reflux    Extrinsic asthma, unspecified    ALLERGY INDUCED  ASTHMA    High blood pressure    History of stomach ulcers    Insulin resistance    Nystagmus    SINCE CHILDHOOD    OBESITY    Gastric bypass 2006    JAGRUTI (obstructive sleep apnea)    AHI 15 Sao2 sridevi 80% CPAP [facility] [employer]    Osteoarthritis    Postconcussion syndrome    Rosacea    Sleep apnea    cpap    Unspecified essential hypertension    Visual impairment    glasses        PSH  Past Surgical History:   Procedure Laterality Date    Arthroscopy of joint unlisted Bilateral     SHOULDER REPLACEMENT    Colonoscopy  08/14/2013    Procedure: COLONOSCOPY;  Surgeon: Marshall Garner MD;  Location:  ENDOSCOPY    Colonoscopy N/A 07/16/2015    Procedure:  COLONOSCOPY;  Surgeon: Marshall Garner MD;  Location:  ENDOSCOPY    Colonoscopy N/A 2018    Procedure: COLONOSCOPY, POSSIBLE BIOPSY, POSSIBLE POLYPECTOMY 62022;  Surgeon: Marshall Garner MD;  Location: [facility]    Colonoscopy N/A 2021    Procedure: COLONOSCOPY;  Surgeon: Tim Ramírez MD;  Location:  ENDOSCOPY    Debride mastoid cavity,simple      Ear cartilage graft to face  2011    Performed by RERE POLLARD at [facility], [facility]    Exc shoulder les sc > 3 cm Right 2014    Procedure: EXCISION OF ARM MASS;  Surgeon: Ar Ortiz MD;  Location: [facility]    Excision turbinate,submucous  2010    Performed by RIOS LIND at [facility], [facility]    Excision turbinate,submucous  2010    Performed by RIOS LIND at [facility], [facility]    Gastric bypass,obesity,sb reconstruc      Mastoidectomy,simple      mastoid surgery    Other Left     shoulder    Other surgical history  10/12/2012    Prostate Biopsy - Dr. Kovacs     Reconstr total shoulder implant Bilateral     Right reverse shoulder arthroplasty     Remv cartilage for graft nasal  2011    Performed by RERE POLLARD at [facility], [facility]    Repair nasal stenosis  2011    Performed by RERE POLLARD at [facility], [facility]    Repair of nasal septum  2010    Performed by RIOS LIND at [facility], [facility]    Skin surgery  2008    Scar to L shoulder s/p excision BCC 08    Spine surgery procedure unlisted      lumbar    Total knee replacement Bilateral         ALL:  Allergies[1]     Home Medications:  Medications Taking[2]      Soc Hx  Social History     Tobacco Use    Smoking status: Former     Current packs/day: 0.00     Average packs/day: 1.5 packs/day for 20.0 years (30.0 ttl pk-yrs)     Types: Cigarettes     Start date: 1978     Quit date: 1998     Years since quittin.7    Smokeless tobacco: Never   Substance Use  Topics    Alcohol use: Yes     Comment: occasional        Fam Hx  Family History   Problem Relation Age of Onset    Hypertension Father     Cancer Mother         colon    Diabetes Mother     Hypertension Mother     Obesity Mother     Psychiatric Mother         depression    Other (Other) Brother         suicide       Review of Systems  Comprehensive ROS reviewed and negative except for what's stated above.   .       OBJECTIVE:  /72   Pulse 97   Temp 97.2 °F (36.2 °C) (Oral)   Resp 18   SpO2 100%   General:  Alert, no distress, appears stated age.   Head:  Normocephalic,    Eyes:  Sclera anicteric    Throat: MMM   Neck: Supple,    Lungs:   Clear to auscultation bilaterally.     Chest wall:  No tenderness or deformity.   Heart:  Regular rate and rhythm    Abdomen:   Soft, NT/ND,    Extremities: Atraumatic,    Skin: Ecchymosis in lower abd, left flank / buttocks   Neurologic: Normal strength, no focal deficit appreciated     Diagnostic Data:    CBC/Chem  Recent Labs   Lab 03/05/25  1331   WBC 15.5*   HGB 12.6*   MCV 91.1   .0       Recent Labs   Lab 03/05/25  1331      K 4.0      CO2 24.0   BUN 16   CREATSERUM 1.66*   *   CA 10.1       Recent Labs   Lab 03/05/25  1331   ALT 12   AST 9   ALB 4.8       No results for input(s): \"TROP\" in the last 168 hours.       Radiology: CT ABDOMEN+PELVIS KIDNEYSTONE 2D RNDR(NO IV,NO ORAL)(CPT=74176)    Result Date: 3/5/2025  PROCEDURE:  CT ABDOMEN+PELVIS KIDNEYSTONE 2D RNDR(NO IV,NO ORAL)(CPT=74176)  COMPARISON:  [facility], CT ABDOMEN PELVIS IV CONTRAST, NO ORAL (ER), 3/24/2017, 7:42 AM.  INDICATIONS:  fatgiue  TECHNIQUE:  Unenhanced multislice CT scanning from above the kidneys to below the urinary bladder.  2D rendering are generated on the CT scanner workstation to localize potential stones in the cranio-caudal plane.  Dose reduction techniques were used. Dose information is transmitted to the ACR (American College of Radiology) NRDR (National  Radiology Data Registry) which includes the Dose Index Registry.  PATIENT STATED HISTORY: (As transcribed by Technologist)  fatigue.  Patient states pain in lower abdomen.  Patient states had laporoscopy procedure 3 weeks ago to take out adrenal tumor in left side.     FINDINGS:  KIDNEYS:  No mass, obstruction, or calcification. BLADDER:  There is stranding around urinary bladder which could indicate cystitis. ADRENALS:  There has been a left adrenalectomy.  There is mild amount of stranding and fluid in the operative space measuring 4.6 x 2 cm.  Right adrenal gland is unremarkable.  LIVER:  No enlargement, atrophy, abnormal density, or significant focal lesion.  BILIARY:  There is moderate distention of the gallbladder.  There are no calcified stones detected.  There is no specific evidence of cholecystitis. PANCREAS:  No lesion, fluid collection, ductal dilatation, or atrophy.  SPLEEN:  No enlargement or focal lesion.  AORTA/VASCULAR:  Aorta is diffusely atherosclerotic but not aneurysmal. RETROPERITONEUM:  No mass or adenopathy.  BOWEL/MESENTERY:  There is diverticulosis of the colon.  There is no CT evidence of diverticulitis.  There has been prior gastric bypass surgery.  There is hyperdense mass in the left upper quadrant adjacent to the abdominal wall.  This is favored to represent hematoma probably related to laparoscopic port.  This measures axial dimensions of 7.1 x 5.3 cm and craniocaudal diameter of 6.7 cm. ABDOMINAL WALL:  Postoperative changes are noted from recent laparoscopic procedure. BONES:  There is advanced degenerative change in the lumbar spine. PELVIC ORGANS:  There is moderate enlargement of prostate. LUNG BASES:  There is dependent atelectasis in the lower lobes. OTHER:  Negative.             CONCLUSION:  1. There has been a left adrenalectomy. 2. Left upper quadrant abdominal wall hematoma is most likely related to 1 of the laparoscopic ports for the recent adrenalectomy. 3. Extensive  stranding around bladder consistent with cystitis. 4. Moderate enlargement of prostate. 5. There is diverticulosis of the colon without CT evidence of diverticulitis.     LOCATION:     Dictated by (CST): Cornell Zheng MD on 3/05/2025 at 7:02 PM     Finalized by (CST): Cornell Zheng MD on 3/05/2025 at 7:10 PM          ASSESSMENT / PLAN:     Patient is a 65 year old male with PMH sig for asthma, htn, hld, recent admissoin for left adrenalectomy complicated by abla / hematoma, here for fatigue, weakness, dysuria.     Impression    -UTI  -diarrhea    -left adrenal adenoma sp resection 02/12/25  -post op anemia / hematoma  -HTN  -HLD  -Asthma  -JAGRUTI    Plan    *UTI  -rocpehin  -blood /urine cx pending  -not septic    *diarrhea  -check cdiff     *adrenal insuffuciency  -on hydrocortisone 10 mg, will increase to 20 given acute illness    *htn  -off of bp meds aside form coreg. Bp borderline, can hold    *statin  -home meds    scds    Further recommendations pending patient's clinical course. [facility] hospitalist to continue to follow patient while in house    Patient and/or patient's family given opportunity to ask questions and note understanding and agreeing with therapeutic plan as outlined    [facility] Hospitalist  [phone]  Answering Service: [phone]           [1]   Allergies  Allergen Reactions    Betadine [Povidone Iodine] ITCHING and SWELLING    Ragweed Runny nose          Seasonal Runny nose    Tree, Elm Runny nose     All trees   [2]   No outpatient medications have been marked as taking for the 3/5/25 encounter (Hospital Encounter).

## 2025-03-06 NOTE — CONSULTS
[facility]  Department of Urology   Consultation Note    Leonel Lloyd Patient Status:  Inpatient    1960 MRN [id number]   Location [facility] 3NW-A Attending Tyler Benitez, DO   Hosp Day # 1 PCP Judson Leon MD     Reason for Consultation:  UTI    History of Present Illness:  Leonel Lloyd is a a(n) 65 year old male.    Patient underwent robotic-assisted laparoscopic left adrenalectomy 25 with Dr. Richards.    Ecchymosis and a subQ hematoma in the area of the extraction incision in the left lower quadrant.   Pathology pending    Presented with fatigue, weakness, dysuria  +urinary urgency  No gross hematuria  No nausea, vomiting, fever, or chills  No abdominal/flank pain    Labs:  Lactic acid 1  WBC 15.5  Hgb 12.6  Serum creat 1.66 (was 1.49 on 25)    UA 3/5/25: >50 WBC/hpf, >10 RBC/hpf, rare bacteria, WBC clump present   Urine culture 3/5/25: pending     blood cultures 3/5/25: pending    Abdominal/pelvic CT scan without contrast 3/5/25:  CONCLUSION:    1. There has been a left adrenalectomy.   2. Left upper quadrant abdominal wall hematoma is most likely related to 1 of the laparoscopic ports for the recent adrenalectomy.   3. Extensive stranding around bladder consistent with cystitis.   4. Moderate enlargement of prostate.   5. There is diverticulosis of the colon without CT evidence of diverticulitis.     Urology was consulted.    Few UTIs.        History:  Past Medical History:    Allergic rhinitis, cause unspecified    Anesthesia complication    high then low B/P after    Asthma (HCC)    Back problem    SCOLIOSIS, AND REPAIRED HERNIATED DISC    BPH (benign prostatic hyperplasia)    Cancer (HCC)    basal cell skin    Complete tear of right rotator cuff    10.2018 mri    COVID    head cold,lost sense o taste 1 day, fatigue. no hospitalization    COVID-19    cough, body aches, loss of taste    Difficult intubation    difficult airway per   Pryzybl--see Airway audit link    Difficult intubation    difficult airway per Dr. Box--see Airway audit link    Diverticulosis of large intestine    Elevated PSA    neg prostate biopsy 2012    Esophageal reflux    Extrinsic asthma, unspecified    ALLERGY INDUCED  ASTHMA    High blood pressure    History of stomach ulcers    Insulin resistance    Nystagmus    SINCE CHILDHOOD    OBESITY    Gastric bypass 2006    JAGRUTI (obstructive sleep apnea)    AHI 15 Sao2 sridevi 80% CPAP 8 [employer]    Osteoarthritis    Postconcussion syndrome    Rosacea    Sleep apnea    cpap    Unspecified essential hypertension    Visual impairment    glasses     Past Surgical History:   Procedure Laterality Date    Arthroscopy of joint unlisted Bilateral     SHOULDER REPLACEMENT    Colonoscopy  08/14/2013    Procedure: COLONOSCOPY;  Surgeon: Marshall Garner MD;  Location:  ENDOSCOPY    Colonoscopy N/A 07/16/2015    Procedure: COLONOSCOPY;  Surgeon: Marshall Garner MD;  Location:  ENDOSCOPY    Colonoscopy N/A 07/03/2018    Procedure: COLONOSCOPY, POSSIBLE BIOPSY, POSSIBLE POLYPECTOMY 16839;  Surgeon: Marshall Garner MD;  Location: [facility]    Colonoscopy N/A 07/13/2021    Procedure: COLONOSCOPY;  Surgeon: Tim Ramírez MD;  Location:  ENDOSCOPY    Debride mastoid cavity,simple      Ear cartilage graft to face  01/11/2011    Performed by RERE POLLARD at [facility]    Exc shoulder les sc > 3 cm Right 07/02/2014    Procedure: EXCISION OF ARM MASS;  Surgeon: Ar Ortiz MD;  Location: [facility]    Excision turbinate,submucous  06/23/2010    Performed by RIOS LIND at [facility]    Excision turbinate,submucous  06/23/2010    Performed by RIOS LIND at [facility]    Gastric bypass,obesity,sb reconstruc  2007    Mastoidectomy,simple      mastoid surgery    Other Left     shoulder    Other surgical history  10/12/2012    Prostate Biopsy - Dr. Kovacs     Reconstr total  shoulder implant Bilateral     Right reverse shoulder arthroplasty     Remv cartilage for graft nasal  01/11/2011    Performed by RERE POLLARD at [facility] [place], [facility]    Repair nasal stenosis  01/11/2011    Performed by RERE POLLARD at [facility], [facility]    Repair of nasal septum  06/23/2010    Performed by RIOS LIND at [facility], [facility]    Skin surgery  09/05/2008    Scar to L shoulder s/p excision BCC 9/5/08    Spine surgery procedure unlisted      lumbar    Total knee replacement Bilateral      Family History   Problem Relation Age of Onset    Hypertension Father     Cancer Mother         colon    Diabetes Mother     Hypertension Mother     Obesity Mother     Psychiatric Mother         depression    Other (Other) Brother         suicide      reports that he quit smoking about 26 years ago. His smoking use included cigarettes. He started smoking about 46 years ago. He has a 30 pack-year smoking history. He has never used smokeless tobacco. He reports current alcohol use. He reports that he does not use drugs.    Allergies:  Allergies[1]    Medications:    Current Facility-Administered Medications:     albuterol (Ventolin HFA) 108 (90 Base) MCG/ACT inhaler 2 puff, 2 puff, Inhalation, Q6H PRN    allopurinol (Zyloprim) tab 200 mg, 200 mg, Oral, Daily    buPROPion ER (Wellbutrin XL) 24 hr tab 150 mg, 150 mg, Oral, Daily    rosuvastatin (Crestor) tab 10 mg, 10 mg, Oral, Nightly    topiramate (TopaMAX) tab 25 mg, 25 mg, Oral, BID    tamsulosin (Flomax) cap 0.4 mg, 0.4 mg, Oral, Nightly    acetaminophen (Tylenol Extra Strength) tab 500 mg, 500 mg, Oral, Q4H PRN    hydrocortisone (Cortef) tab 20 mg, 20 mg, Oral, Daily    cefTRIAXone (Rocephin) 1 g in sodium chloride 0.9% 100 mL IVPB-ADDV, 1 g, Intravenous, Q24H    Review of Systems:  Pertinent items are noted in HPI.  10 point review of systems completed.    Physical Exam:  /62 (BP Location: Left arm)   Pulse 91   Temp 98.9 °F (37.2 °C)  (Oral)   Resp 16   SpO2 97%   GENERAL: the patient is resting in bed in no acute distress.    HEENT: Unremarkable.  NECK: Supple.   LUNGS: non-labored respirations  ABDOMEN:   The abdomen is soft and nontender without rebound or guarding.  The bladder is non-palpable.    BACK: Without CVA tenderness.    SKIN: +ecchymosis lower abdomen/left flank  NEUROLOGIC: Grossly intact.      Laboratory Data:  Lab Results   Component Value Date    WBC 15.5 03/05/2025    HGB 12.6 03/05/2025    HCT 38.0 03/05/2025    .0 03/05/2025    CREATSERUM 1.66 03/05/2025    BUN 16 03/05/2025     03/05/2025    K 4.0 03/05/2025     03/05/2025    CO2 24.0 03/05/2025     03/05/2025    CA 10.1 03/05/2025    ALB 4.8 03/05/2025    ALKPHO 77 03/05/2025    BILT 0.9 03/05/2025    TP 7.1 03/05/2025    AST 9 03/05/2025    ALT 12 03/05/2025    PGLU 114 03/05/2025     Lactic acid 1  WBC 15.5  Hgb 12.6  Serum creat 1.66 (was 1.49 on 2/14/25)    UA 3/5/25: >50 WBC/hpf, >10 RBC/hpf, rare bacteria, WBC clump present   Urine culture 3/5/25: pending    2/2 blood cultures 3/5/25: pending       Imaging  CT ABDOMEN+PELVIS KIDNEYSTONE 2D RNDR(NO IV,NO ORAL)(CPT=74176)    Result Date: 3/5/2025  CONCLUSION:  1. There has been a left adrenalectomy. 2. Left upper quadrant abdominal wall hematoma is most likely related to 1 of the laparoscopic ports for the recent adrenalectomy. 3. Extensive stranding around bladder consistent with cystitis. 4. Moderate enlargement of prostate. 5. There is diverticulosis of the colon without CT evidence of diverticulitis.     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)    Chronic seasonal allergic rhinitis due to pollen    Gastrointestinal hemorrhage    Lumbar radiculitis    DDD (degenerative disc disease), lumbar    Lumbar spondylosis    HNP (herniated nucleus pulposus), lumbar    Lumbar disc herniation with radiculopathy    Degenerative scoliosis    S/P lumbar microdiscectomy    Osteoarthritis of right glenohumeral joint    Insulin resistance    Drug-induced constipation    Complete tear of right rotator cuff    Rosacea    Elevated PSA    Diverticulosis of large intestine    BPH (benign prostatic hyperplasia)    Palpitation    Right rotator cuff tear arthropathy    Right Revers shoulder Arthroplasty  Global 03/18/2019    Left rotator cuff tear arthropathy    Status post reverse total replacement of right shoulder    Primary osteoarthritis of right shoulder    S/P reverse total shoulder arthroplasty, left    Right total knee arthroplasty  Global 04/06/2020    S/P total knee arthroplasty, right    Anemia due to acute blood loss    Frequent PVCs    Sinus bradycardia    Benign prostatic hyperplasia with lower urinary tract symptoms    Primary osteoarthritis of left knee    Pre-op testing    Left adrenal mass (HCC)    Sepsis secondary to UTI (HCC)    History of total adrenalectomy (HCC)       UTI  Afebrile, VSS  Lactic acid 1  WBC 15.5  Hgb 12.6  Serum creat 1.66 (was 1.49 on 2/14/25)  UA 3/5/25: >50 WBC/hpf, >10 RBC/hpf, rare bacteria, WBC clump present   Urine culture 3/5/25: pending  2/2 blood cultures 3/5/25: pending  CT A/P without contrast 3/5/25: no renal mass/obstruction/stone.  Left upper quadrant abdominal wall hematoma is most likely related to 1 of the laparoscopic ports for the recent adrenalectomy.  Extensive stranding around bladder consistent with  cystitis. Moderate enlargement of prostate.      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 Richards  Ecchymosis and a subQ hematoma in the area of the extraction incision in the left lower quadrant/flank area  Pathology pending    Recommendations:  -Check PVR bladder scan  -Check final cultures  -Abx per attending  -Hydration  -Follow labs, temp, UOP  -Continue with tamsulosin    Above discussed with patient, nurse  Will update Dr. Richards.    Thank you for allowing me to participate in the care of your patient.    Liza Burch PA-C  [facility]  Department of Urology  3/6/2025  4:57 AM         [1]   Allergies  Allergen Reactions    Betadine [Povidone Iodine] ITCHING and SWELLING    Ragweed Runny nose          Seasonal Runny nose    Tree, Elm Runny nose     All trees

## 2025-03-06 NOTE — ED QUICK NOTES
Orders for admission, patient is aware of plan and ready to go upstairs. Any questions, please call ED RN Sujata GARCIA at extension [phone].     Patient Covid vaccination status: Fully vaccinated     COVID Test Ordered in ED: None    COVID Suspicion at Admission: N/A    Running Infusions:      Mental Status/LOC at time of transport: A/O x4. ambulatory    Other pertinent information:   CIWA score: N/A   NIH score:  N/A         [Alert] : alert [No Acute Distress] : no acute distress [Normocephalic] : normocephalic [EOMI Bilateral] : EOMI bilateral [Clear tympanic membranes with bony landmarks and light reflex present bilaterally] : clear tympanic membranes with bony landmarks and light reflex present bilaterally  [Pink Nasal Mucosa] : pink nasal mucosa [Nonerythematous Oropharynx] : nonerythematous oropharynx [Supple, full passive range of motion] : supple, full passive range of motion [No Palpable Masses] : no palpable masses [Clear to Auscultation Bilaterally] : clear to auscultation bilaterally [Regular Rate and Rhythm] : regular rate and rhythm [Normal S1, S2 audible] : normal S1, S2 audible [No Murmurs] : no murmurs [+2 Femoral Pulses] : +2 femoral pulses [Soft] : soft [NonTender] : non tender [Non Distended] : non distended [Normoactive Bowel Sounds] : normoactive bowel sounds [No Hepatomegaly] : no hepatomegaly [No Splenomegaly] : no splenomegaly [Juan Carlos: ____] : Juan Carlos [unfilled] [Juan Carlos: _____] : Juan Carlos [unfilled] [No Abnormal Lymph Nodes Palpated] : no abnormal lymph nodes palpated [Normal Muscle Tone] : normal muscle tone [Straight] : straight [No Scoliosis] : no scoliosis [Cranial Nerves Grossly Intact] : cranial nerves grossly intact [No Rash or Lesions] : no rash or lesions

## 2025-03-06 NOTE — PLAN OF CARE
Pt arrived on unit at 2200 from ED accompanied by wife, readmitted after signs of infection 2 weeks post op adrenalectomy. Pt is A&O x 4. Pt's lungs are clear, breathing labored with exertion. Vital signs are stable. Pt's diet is 1800 carb controlled. Pt is continent of bladder and bowel. Pt ambulates with standby assist. Skin admission assessment was performed with Arti, PCt and revealed healing abdominal lap sites CDI. POC discussed with pt and wife who both who verbalized understanding. NOC safety plan in place, bed in low position, call light and personal items within reach, pt encouraged to call for assistance.

## 2025-03-06 NOTE — PLAN OF CARE
Problem: GENITOURINARY - ADULT  Goal: Absence of urinary retention  Description: INTERVENTIONS:  - Assess patient’s ability to void and empty bladder  - Monitor intake/output and perform bladder scan as needed  - Follow urinary retention protocol/standard of care  - Consider collaborating with pharmacy to review patient's medication profile  - Implement strategies to promote bladder emptying  Outcome: Progressing     Problem: METABOLIC/FLUID AND ELECTROLYTES - ADULT  Goal: Electrolytes maintained within normal limits  Description: INTERVENTIONS:  - Monitor labs and rhythm and assess patient for signs and symptoms of electrolyte imbalances  - Administer electrolyte replacement as ordered  - Monitor response to electrolyte replacements, including rhythm and repeat lab results as appropriate  - Fluid restriction as ordered  - Instruct patient on fluid and nutrition restrictions as appropriate  Outcome: Progressing  Goal: Hemodynamic stability and optimal renal function maintained  Description: INTERVENTIONS:  - Monitor labs and assess for signs and symptoms of volume excess or deficit  - Monitor intake, output and patient weight  - Monitor urine specific gravity, serum osmolarity and serum sodium as indicated or ordered  - Monitor response to interventions for patient's volume status, including labs, urine output, blood pressure (other measures as available)  - Encourage oral intake as appropriate  - Instruct patient on fluid and nutrition restrictions as appropriate  Outcome: Progressing     Problem: SKIN/TISSUE INTEGRITY - ADULT  Goal: Incision(s), wounds(s) or drain site(s) healing without S/S of infection  Description: INTERVENTIONS:  - Assess and document risk factors for pressure ulcer development  - Assess and document skin integrity  - Assess and document dressing/incision, wound bed, drain sites and surrounding tissue  - Implement wound care per orders  - Initiate isolation precautions as appropriate  -  Initiate Pressure Ulcer prevention bundle as indicated  Outcome: Progressing

## 2025-03-06 NOTE — PLAN OF CARE
Admission navigator completed by admission RN.       Med req updated since meds were reconciled (coreg, hydrocortisone AND crestor updated takes 10mg at home.     +JAGRUTI. Wears cpap at home.

## 2025-03-06 NOTE — PROGRESS NOTES
[facility]  Hospitalist Progress Note                                                                     [facility]   part of [facility]        Leonel Lloyd  1/5/1960    SUBJECTIVE:  Patient seen and examined.  Feeling much better.  Wife bedside.  Denies CP/SOB.  NAD.       OBJECTIVE:  Temp:  [97.2 °F (36.2 °C)-98.9 °F (37.2 °C)] 98.2 °F (36.8 °C)  Pulse:  [] 84  Resp:  [16-18] 18  BP: (104-120)/(62-84) 110/66  SpO2:  [95 %-100 %] 96 %  Exam  Gen: No acute distress, alert and oriented x3, no focal neurologic deficits  Pulm: Lungs clear bilaterally, normal respiratory effort  CV: Heart with regular rate and rhythm, no murmur.  Normal PMI.    Abd: Abdomen soft, nontender, nondistended, no organomegaly, bowel sounds present  MSK: Full range of motion in extremities, no clubbing, no cyanosis  Skin: no rashes or lesions    Labs:   Recent Labs   Lab 03/05/25  1331 03/06/25  0513   WBC 15.5* 16.4*   HGB 12.6* 11.9*   MCV 91.1 91.1   .0 179.0       Recent Labs   Lab 03/05/25  1331 03/06/25  0513    139   K 4.0 3.8    107   CO2 24.0 19.0*   BUN 16 19   CREATSERUM 1.66* 1.77*   CA 10.1 9.5   MG  --  1.7   * 116*       Recent Labs   Lab 03/05/25  1331   ALT 12   AST 9   ALB 4.8       Recent Labs   Lab 03/05/25  2151   PGLU 114*       Meds:   Scheduled:    allopurinol  200 mg Oral Daily    buPROPion ER  150 mg Oral Daily    rosuvastatin  10 mg Oral Nightly    topiramate  25 mg Oral BID    tamsulosin  0.4 mg Oral Nightly    hydrocortisone  20 mg Oral Daily    cefTRIAXone  1 g Intravenous Q24H     Continuous Infusions:    lactated ringers 125 mL/hr at 03/06/25 0903     PRN:   albuterol    acetaminophen    Assessment/Plan:  Principal Problem:    Sepsis secondary to UTI (HCC)  Active Problems:    History of total adrenalectomy (HCC)    65 year old male with PMH sig for asthma, htn, hld, recent admissoin for left adrenalectomy  complicated by abla / hematoma, here for fatigue, weakness, dysuria.     Acute E.coli UTI  - ceftriaxone  - IVF  - await cultures    Acute renal injury  - prerenal  - still volume depleted  - cont isotonic crystalloids  - if no improvement will consult nephrology  - avoid nephrotoxins    Diarrhea  - cdiff negative  - monitor    Adrenal insufficiency   - increased PTA hydrocortidsone to 20mg every day given acute illness    Essential HTN  - coreg    HL  - statin    Asthma  - puff prn     FEN: regular diet, PT?OT  Proph: SCDs, heparin  Code status: Full code        Kim Martínez MD  [facility] Center[facility]  Message over [employer]/[address]  Pager: [phone]    Supplementary Documentation:   DVT Mechanical Prophylaxis:   SCDs,    DVT Pharmacologic Prophylaxis   Medication   None                Code Status: Full Code  Rea: No urinary catheter in place  Rea Duration (in days):   Central line:    ASIA: 3/7/2025        **Certification      PHYSICIAN Certification of Need for Inpatient Hospitalization - Initial Certification    Patient will require inpatient services that will reasonably be expected to span two midnight's based on the clinical documentation in H+P.   Based on patients current state of illness, I anticipate that, after discharge, patient will require TBD.

## 2025-03-06 NOTE — DIETARY NOTE
[facility]   part of [facility]   CLINICAL NUTRITION    Leonel Lloyd     Admitting diagnosis:  Sepsis secondary to UTI (HCC) [A41.9, N39.0]  History of total adrenalectomy (HCC) [E89.6]    Ht:  70\"  Wt:  . 132 kg  There is no height or weight on file to calculate BMI.    Wt Readings from Last 6 Encounters:   02/12/25 132 kg (291 lb)   06/06/23 (!) 140.2 kg (309 lb)   04/26/23 136.1 kg (300 lb)   01/18/22 (!) 145.2 kg (320 lb)   01/04/22 (!) 146.1 kg (322 lb)   11/10/21 (!) 145.2 kg (320 lb)      Labs/Meds reviewed    Diet:       Procedures    Carbohydrate controlled diet 1800 kcal/60 grams; Is Patient on Accuchecks? No     Percent Meals Eaten (last 3 days)       Date/Time Percent Meals Eaten (%)    03/06/25 0903 100 %          Pt chart reviewed d/t MST. Pt reports appetite has been lower since surgery 2 weeks ago. Pt had a good breakfast this am. No wasting noted. Pt declined ONS. Discussed good protein sources, pt aware as previously had wt loss surgery.   Patient reports fair appetite at this time.  Nursing notes reports Percent Meals Eaten (%): 100 % intake for last meal.  Tolerating po diet without diarrhea, emesis, or constipation.   No significant weight changes noted.     Patient is at low nutrition risk at this time.    Please consult if patient status changes or nutrition issues arise.    Marilyn Foley RD, LDN  Clinical Dietitian

## 2025-03-07 VITALS
DIASTOLIC BLOOD PRESSURE: 60 MMHG | RESPIRATION RATE: 16 BRPM | TEMPERATURE: 98 F | SYSTOLIC BLOOD PRESSURE: 101 MMHG | HEART RATE: 72 BPM | OXYGEN SATURATION: 90 %

## 2025-03-07 LAB
ANION GAP SERPL CALC-SCNC: 8 MMOL/L (ref 0–18)
BASOPHILS # BLD AUTO: 0.02 X10(3) UL (ref 0–0.2)
BASOPHILS NFR BLD AUTO: 0.2 %
BUN BLD-MCNC: 19 MG/DL (ref 9–23)
CALCIUM BLD-MCNC: 9.1 MG/DL (ref 8.7–10.6)
CHLORIDE SERPL-SCNC: 109 MMOL/L (ref 98–112)
CO2 SERPL-SCNC: 22 MMOL/L (ref 21–32)
CREAT BLD-MCNC: 1.44 MG/DL
EGFRCR SERPLBLD CKD-EPI 2021: 53 ML/MIN/1.73M2 (ref 60–?)
EOSINOPHIL # BLD AUTO: 0.2 X10(3) UL (ref 0–0.7)
EOSINOPHIL NFR BLD AUTO: 1.8 %
ERYTHROCYTE [DISTWIDTH] IN BLOOD BY AUTOMATED COUNT: 13.7 %
GLUCOSE BLD-MCNC: 117 MG/DL (ref 70–99)
HCT VFR BLD AUTO: 31.5 %
HGB BLD-MCNC: 10.4 G/DL
IMM GRANULOCYTES # BLD AUTO: 0.23 X10(3) UL (ref 0–1)
IMM GRANULOCYTES NFR BLD: 2 %
LYMPHOCYTES # BLD AUTO: 0.89 X10(3) UL (ref 1–4)
LYMPHOCYTES NFR BLD AUTO: 7.8 %
MAGNESIUM SERPL-MCNC: 1.9 MG/DL (ref 1.6–2.6)
MCH RBC QN AUTO: 29.9 PG (ref 26–34)
MCHC RBC AUTO-ENTMCNC: 33 G/DL (ref 31–37)
MCV RBC AUTO: 90.5 FL
MONOCYTES # BLD AUTO: 0.73 X10(3) UL (ref 0.1–1)
MONOCYTES NFR BLD AUTO: 6.4 %
NEUTROPHILS # BLD AUTO: 9.32 X10 (3) UL (ref 1.5–7.7)
NEUTROPHILS # BLD AUTO: 9.32 X10(3) UL (ref 1.5–7.7)
NEUTROPHILS NFR BLD AUTO: 81.8 %
OSMOLALITY SERPL CALC.SUM OF ELEC: 291 MOSM/KG (ref 275–295)
PLATELET # BLD AUTO: 161 10(3)UL (ref 150–450)
POTASSIUM SERPL-SCNC: 3.8 MMOL/L (ref 3.5–5.1)
POTASSIUM SERPL-SCNC: 3.8 MMOL/L (ref 3.5–5.1)
RBC # BLD AUTO: 3.48 X10(6)UL
SODIUM SERPL-SCNC: 139 MMOL/L (ref 136–145)
WBC # BLD AUTO: 11.4 X10(3) UL (ref 4–11)

## 2025-03-07 PROCEDURE — 80048 BASIC METABOLIC PNL TOTAL CA: CPT | Performed by: HOSPITALIST

## 2025-03-07 PROCEDURE — 83735 ASSAY OF MAGNESIUM: CPT | Performed by: HOSPITALIST

## 2025-03-07 PROCEDURE — 84132 ASSAY OF SERUM POTASSIUM: CPT | Performed by: HOSPITALIST

## 2025-03-07 PROCEDURE — 94799 UNLISTED PULMONARY SVC/PX: CPT

## 2025-03-07 PROCEDURE — 85025 COMPLETE CBC W/AUTO DIFF WBC: CPT | Performed by: HOSPITALIST

## 2025-03-07 RX ORDER — POTASSIUM CHLORIDE 1500 MG/1
40 TABLET, EXTENDED RELEASE ORAL ONCE
Status: COMPLETED | OUTPATIENT
Start: 2025-03-07 | End: 2025-03-07

## 2025-03-07 RX ORDER — ASPIRIN 81 MG/1
81 TABLET ORAL DAILY
Status: DISCONTINUED | OUTPATIENT
Start: 2025-03-07 | End: 2025-03-07

## 2025-03-07 RX ORDER — CARVEDILOL 12.5 MG/1
25 TABLET ORAL 2 TIMES DAILY WITH MEALS
Status: DISCONTINUED | OUTPATIENT
Start: 2025-03-07 | End: 2025-03-07

## 2025-03-07 RX ORDER — CEPHALEXIN 500 MG/1
500 CAPSULE ORAL 3 TIMES DAILY
Qty: 12 CAPSULE | Refills: 0 | Status: SHIPPED | OUTPATIENT
Start: 2025-03-08 | End: 2025-03-12

## 2025-03-07 NOTE — DISCHARGE INSTRUCTIONS
Continue with diet as tolerated. If you develop a fever >100.5, or difficulty urinating, call your doctor.

## 2025-03-07 NOTE — PLAN OF CARE
Problem: GENITOURINARY - ADULT  Goal: Absence of urinary retention  Description: INTERVENTIONS:  - Assess patient’s ability to void and empty bladder  - Monitor intake/output and perform bladder scan as needed  - Follow urinary retention protocol/standard of care  - Consider collaborating with pharmacy to review patient's medication profile  - Implement strategies to promote bladder emptying  Outcome: Adequate for Discharge     Problem: METABOLIC/FLUID AND ELECTROLYTES - ADULT  Goal: Electrolytes maintained within normal limits  Description: INTERVENTIONS:  - Monitor labs and rhythm and assess patient for signs and symptoms of electrolyte imbalances  - Administer electrolyte replacement as ordered  - Monitor response to electrolyte replacements, including rhythm and repeat lab results as appropriate  - Fluid restriction as ordered  - Instruct patient on fluid and nutrition restrictions as appropriate  Outcome: Adequate for Discharge  Goal: Hemodynamic stability and optimal renal function maintained  Description: INTERVENTIONS:  - Monitor labs and assess for signs and symptoms of volume excess or deficit  - Monitor intake, output and patient weight  - Monitor urine specific gravity, serum osmolarity and serum sodium as indicated or ordered  - Monitor response to interventions for patient's volume status, including labs, urine output, blood pressure (other measures as available)  - Encourage oral intake as appropriate  - Instruct patient on fluid and nutrition restrictions as appropriate  Outcome: Adequate for Discharge     Problem: SKIN/TISSUE INTEGRITY - ADULT  Goal: Incision(s), wounds(s) or drain site(s) healing without S/S of infection  Description: INTERVENTIONS:  - Assess and document risk factors for pressure ulcer development  - Assess and document skin integrity  - Assess and document dressing/incision, wound bed, drain sites and surrounding tissue  - Implement wound care per orders  - Initiate isolation  precautions as appropriate  - Initiate Pressure Ulcer prevention bundle as indicated  Outcome: Adequate for Discharge

## 2025-03-07 NOTE — PLAN OF CARE
Patients IV d/c'd, catheter intact. All discharge instructions explained, all questions answered. Patient discharged via wheelchair with support staff.

## 2025-03-07 NOTE — PLAN OF CARE
Patient AOX4. VSS. Ambulating in halls/room. Old incisional area intact, bruising noted to left lower abdomen and flank area. Denies chest/calf pain. POC discussed with patient, all questions and concerns addressed. All safety measures in place. Plan for discharge after afternoon antibiotic.

## 2025-03-07 NOTE — DISCHARGE SUMMARY
[facility] Hospitalist Discharge Summary     Patient ID:  Leonel Lloyd  65 year old  1/5/1960    Admit date: 3/5/2025    Discharge date and time: 03/07/25     Attending Physician: Gerardo Martínez*     Primary Care Physician: Judson Leon MD     Discharge Diagnoses: Sepsis secondary to UTI (HCC) [A41.9, N39.0]  History of total adrenalectomy (HCC) [E89.6]    Please note that only IHP DMG and EMG patients enrolled in the Medicare ACO, [facility] ACO and [facility] HMOs will be handled by the \[facility] [place] [facility]\"" Care Management team.  For all other patients, please follow usual protocol for discharge care transition.    Discharge Condition: stable    Disposition:  home    Important Follow up:  - PCP within 2 weeks              Hospital Course:        65 year old male with PMH sig for asthma, htn, hld, recent admissoin for left adrenalectomy complicated by abla / hematoma, here for fatigue, weakness, dysuria.   Urinary symptoms started yesterday, no f/c.   Has been also feeling fatigues, no appetite, has had diarrhea, no bleeding. No n/v. Has been going on sine the surgery    Acute E.coli UTI  - ceftriaxone  - IVF  - await cultures - pansensitive  - dc with Keflex to complete 1w course      Acute renal injury on CKD3  - prerenal  - still volume depleted  - cont isotonic crystalloids - completed  - avoid nephrotoxins     Diarrhea  - cdiff negative  - monitor     Adrenal insufficiency   - increased PTA hydrocortidsone to 20mg every day given acute illness - resume 10mg on dc      Essential HTN  - coreg     HL  - statin     Asthma  - puff prn     Consults: IP CONSULT TO UROLOGY  IP CONSULT TO HOSPITALIST  IP CONSULT TO UROLOGY  NURSING CONSULT TO DIETITIAN    Operative Procedures:        Patient instructions:      I as the attending physician reconciled the current and discharge medications on day of discharge.     Current Discharge Medication List        START taking  these medications    Details   cephALEXin 500 MG Oral Cap Take 1 capsule (500 mg total) by mouth 3 (three) times daily for 4 days.           CONTINUE these medications which have NOT CHANGED    Details   hydrocortisone 10 MG Oral Tab Take 1 tablet (10 mg total) by mouth daily.      carvedilol 25 MG Oral Tab Take 1 tablet (25 mg total) by mouth 2 (two) times daily with meals.      aspirin 81 MG Oral Tab EC Take 1 tablet (81 mg total) by mouth daily. May resume 2/19/25      ferrous sulfate 325 (65 FE) MG Oral Tab EC Take 1 tablet (325 mg total) by mouth daily with breakfast.      buPROPion  MG Oral Tablet 24 Hr Take 1 tablet (150 mg total) by mouth daily.      metFORMIN 500 MG Oral Tab Take 3 tablets (1,500 mg total) by mouth at bedtime.      Zinc 50 MG Oral Tab Take 1 tablet by mouth daily.      topiramate 50 MG Oral Tab Take 0.5 tablets (25 mg total) by mouth 2 (two) times daily.      ALBUTEROL 108 (90 Base) MCG/ACT Inhalation Aero Soln INHALE 2 PUFFS INTO THE LUNGS EVERY 4 TO 6 HOURS AS NEEDED FOR WHEEZING      MONTELUKAST 10 MG Oral Tab TAKE 1 TABLET BY MOUTH EVERY DAY      ALLOPURINOL 100 MG Oral Tab TAKE 2 TABLETS BY MOUTH EVERY DAY      diphenhydrAMINE HCl 25 MG Oral Cap Take 1 capsule (25 mg total) by mouth as needed for Allergies.      Glucosamine HCl (GLUCOSAMINE OR) Take 2 tablets by mouth daily.      Cholecalciferol (VITAMIN D3 OR) Take 1 tablet by mouth daily.        Azelaic Acid (FINACEA) 15 % External Gel Apply to face daily      Multiple Vitamins-Minerals (OPTISOURCE POST BARIATRIC SURG) Oral Chew Tab Chew 2 tablets by mouth daily.      Albuterol Sulfate (2.5 MG/3ML) 0.083% Inhalation Nebu Soln Take 3 mL (2.5 mg total) by nebulization every 4 (four) hours as needed for Wheezing or Shortness of Breath.      rosuvastatin 20 MG Oral Tab Take 0.5 tablets (10 mg total) by mouth nightly.      EPINEPHrine (EPIPEN 2-PENNY) 0.3 MG/0.3ML Injection Solution Auto-injector Use as directed, for anaphylaxis, call  911      Colchicine 0.6 MG Oral Tab 1 tablet at start of gout attack . May repeat in 4-6 hours. No greater than 2 pills daily           STOP taking these medications       docusate sodium 100 MG Oral Cap        HYDROcodone-acetaminophen 5-325 MG Oral Tab              Activity: activity as tolerated  Diet: regular diet  Wound Care: as directed  Code Status: Full Code      Discharge Exam:     General: no acute distress, alert and oriented x 3  Heart: RRR  Lungs: clear bilaterally, no active wheezing  Abdomen: nontender, nondistended, intact BS  Extremities: no pedal edema   Neuro: CN inact, no focal deficits      Total time coordinating care for discharge: Greater than 30 minutes    Kim Martínez MD  [facility] Hospitalist

## 2025-03-07 NOTE — PROGRESS NOTES
[facility]  Urology Progress Note    Leonel Lloyd Patient Status:  Inpatient    1960 MRN [id number]   Location [facility] 3NW-A Attending Gerardo Martínez*   Hosp Day # 2 PCP Judson Leon MD     Subjective:  Leonel Lloyd is a(n) 65 year old male.    Current complaints: Feeling better.  No abdominal/flank pain.  Dysuria/urinary urgency improving. No gross hematuria. No nausea, vomiting, fever, or chills.      PVR 6 mL    Objective:  General appearance: alert, appears stated age, and cooperative  Blood pressure 117/74, pulse 88, temperature 98.5 °F (36.9 °C), temperature source Oral, resp. rate 18, SpO2 98%.  Lungs: non-labored respirations  Abdomen: soft, non-tender  SKIN: +ecchymosis lower abdomen/left flank   Lab Results   Component Value Date    WBC 11.4 2025    HGB 10.4 2025    HCT 31.5 2025    .0 2025    CREATSERUM 1.44 2025    BUN 19 2025     2025    K 3.8 2025    K 3.8 2025     2025    CO2 22.0 2025     2025    CA 9.1 2025    MG 1.9 2025    PGLU 119 2025       Hospital Encounter on 25   1. Blood Culture     Status: None (Preliminary result)    Collection Time: 25  4:56 PM    Specimen: Blood,peripheral   Result Value Ref Range    Blood Culture Result No Growth 1 Day N/A   2. Urine Culture, Routine     Status: Abnormal (Preliminary result)    Collection Time: 25  1:31 PM    Specimen: Urine, clean catch   Result Value Ref Range    Urine Culture >100,000 CFU/ML Escherichia coli (A) N/A        CT ABDOMEN+PELVIS KIDNEYSTONE 2D RNDR(NO IV,NO ORAL)(CPT=74176)    Result Date: 3/5/2025  CONCLUSION:  1. There has been a left adrenalectomy. 2. Left upper quadrant abdominal wall hematoma is most likely related to 1 of the laparoscopic ports for the recent adrenalectomy. 3. Extensive stranding around bladder consistent with cystitis. 4. Moderate  enlargement of prostate. 5. There is diverticulosis of the colon without CT evidence of diverticulitis.     LOCATION:     Dictated by (CST): Cornell Zheng MD on 3/05/2025 at 7:02 PM     Finalized by (CST): Cornell Zheng MD on 3/05/2025 at 7:10 PM         Assessment:    UTI  Afebrile, VSS  Lactic acid 1  WBC 15.5 > 16.4 > 11.4  Hgb 12.6 > 11.9 > 10.4  Serum creat 1.66 > 1.77 > 1.44   (was 1.49 on 2/14/25)  UA 3/5/25: >50 WBC/hpf, >10 RBC/hpf, rare bacteria, WBC clump present   Urine culture 3/5/25: prelim >100K CFU/mL E. coli  2/2 blood cultures 3/5/25: prelim no growth after 1 day  CDIFF 3/6/25: negative  CT A/P without contrast 3/5/25: no renal mass/obstruction/stone.  Left upper quadrant abdominal wall hematoma is most likely related to 1 of the laparoscopic ports for the recent adrenalectomy.  Extensive stranding around bladder consistent with cystitis. Moderate enlargement of prostate.       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  -Focal myelolipoma identified.    Plan:    Check final cultures  Abx per attending  Follow labs, temp  Continue with tamsulosin  Dr. Richards to review pathology results/recommendations with patient    Above discussed with patient, Dr. Richards.  BETZY Quiros  [facility]  Department of Urology  3/7/2025  6:11 AM

## 2025-03-07 NOTE — PLAN OF CARE
Addended by: ARAM RUSSELL on: 2/24/2019 04:21 AM     Modules accepted: Level of Service     Patient alert and oriented x4, vital signs stable on room air. Up ad marzena to void, voiding adequately. IV fluids infusing, tolerating diet. Denies any pain or nausea. Safety measures in place, questions addressed, plan of care discussed with patient.

## 2025-03-21 NOTE — PROGRESS NOTES
Provider Clarification     Additional information on the status of the Sepsis diagnosis    Sepsis was ruled out     This note is part of the patient's medical record.

## 2025-05-25 NOTE — BRIEF OP NOTE
"Subjective:      Patient ID: John Brown is a 50 y.o. male.    Vitals:  height is 5' 9" (1.753 m) and weight is 113.4 kg (250 lb). His oral temperature is 98.8 °F (37.1 °C). His blood pressure is 116/81 and his pulse is 94. His respiration is 16 and oxygen saturation is 95%.     Chief Complaint: Sore Throat (Entered by patient)    This is a 50 y.o. male with a chief complaint of sore throat, fever, headache, rhinorrhea, nasal congestion, coughing, bilateral ear itching, pain with swallowing, loss of appetite, body ache. Sx started 4-5 days ago and are worsening with increased chest congestion.   Pt has taken aleve, thera flu, cough drops for sx with little relief.       Sore Throat   This is a new problem. The current episode started in the past 7 days. The problem has been gradually worsening. Neither side of throat is experiencing more pain than the other. The maximum temperature recorded prior to his arrival was 101 - 101.9 F. Associated symptoms include congestion, coughing, headaches, a hoarse voice, a plugged ear sensation and trouble swallowing. Treatments tried: aleve, theraflu. The treatment provided mild relief.       HENT:  Positive for congestion, sore throat and trouble swallowing.    Respiratory:  Positive for cough.    Neurological:  Positive for headaches.      Objective:     Physical Exam   Constitutional: He is oriented to person, place, and time. He appears well-developed. He is cooperative.  Non-toxic appearance. He does not appear ill. No distress.   HENT:   Head: Normocephalic and atraumatic.   Ears:   Right Ear: Hearing, tympanic membrane, external ear and ear canal normal. no impacted cerumen  Left Ear: Hearing, tympanic membrane, external ear and ear canal normal. no impacted cerumen  Nose: Congestion present. No mucosal edema, rhinorrhea or nasal deformity. No epistaxis. Right sinus exhibits no maxillary sinus tenderness and no frontal sinus tenderness. Left sinus exhibits no maxillary " Pre-Operative Diagnosis: CUFF TEAR ARTHROPATHY RIGHT SHOULDER      Post-Operative Diagnosis: SAME     Procedure Performed:   Procedure(s):  RIGHT REVERSE  SHOULDER ARTHROPLASTY    Surgeon(s) and Role:     * Nate Aguila MD - Primary    Assistant(s): sinus tenderness and no frontal sinus tenderness.   Mouth/Throat: Uvula is midline, oropharynx is clear and moist and mucous membranes are normal. No trismus in the jaw. Normal dentition. No uvula swelling. No oropharyngeal exudate or posterior oropharyngeal edema.      Comments:   +ve pharyngeal erythema w/o tonsillar swelling or exudates.        Eyes: Conjunctivae and lids are normal. No scleral icterus.   Neck: Trachea normal and phonation normal. Neck supple. No edema present. No erythema present. No neck rigidity present.   Cardiovascular: Normal rate, regular rhythm, normal heart sounds and normal pulses.   No murmur heard.  Pulmonary/Chest: Effort normal and breath sounds normal. No stridor. No respiratory distress. He has no decreased breath sounds. He has no wheezes. He has no rhonchi. He has no rales.   Abdominal: Normal appearance. He exhibits no distension. There is no abdominal tenderness. There is no left CVA tenderness and no right CVA tenderness.   Musculoskeletal: Normal range of motion.         General: No deformity. Normal range of motion.      Cervical back: He exhibits no tenderness.   Lymphadenopathy:     He has no cervical adenopathy.   Neurological: He is alert, oriented to person, place, and time and at baseline. He exhibits normal muscle tone. Coordination normal.   Skin: Skin is warm, dry, intact, not diaphoretic and not pale.   Psychiatric: His speech is normal and behavior is normal. Judgment and thought content normal.   Nursing note and vitals reviewed.      Assessment:     1. Fever, unspecified fever cause    2. Cough, unspecified type    3. Acute bronchitis due to infection    4. Sore throat        Plan:   Discussed exam findings/results/diagnosis/plan with patient. Advised to f/u with PCP within 2-5 days. ER precautions given if symptoms get any worse. All questions answered. Patient verbally understood and agreed with treatment plan.  Educational materials and instructions  regarding the visit diagnosis and management provided.     Fever, unspecified fever cause  -     POCT Influenza A/B MOLECULAR  -     POCT Strep A, Molecular  -     X-Ray Chest PA And Lateral; Future; Expected date: 05/25/2025    Cough, unspecified type  -     X-Ray Chest PA And Lateral; Future; Expected date: 05/25/2025  -     SARS Coronavirus 2 Antigen, POCT Manual Read    Acute bronchitis due to infection    Sore throat    Other orders  -     doxycycline (VIBRA-TABS) 100 MG tablet; Take 1 tablet (100 mg total) by mouth 2 (two) times daily. for 7 days  Dispense: 14 tablet; Refill: 0  -     promethazine-dextromethorphan (PROMETHAZINE-DM) 6.25-15 mg/5 mL Syrp; Take 5 mLs by mouth 3 (three) times daily as needed (cough).  Dispense: 120 mL; Refill: 0

## (undated) DEVICE — STERILE POLYISOPRENE POWDER-FREE SURGICAL GLOVES: Brand: PROTEXIS

## (undated) DEVICE — MLPD DISPOSABLE PAD (6' ROLL) 3 ROLLS: Brand: SCHAERER MEDICAL USA

## (undated) DEVICE — ABSORBABLE HEMOSTAT (OXIDIZED REGENERATED CELLULOSE): Brand: SURGICEL NU-KNIT

## (undated) DEVICE — ADHESIVE SKIN TOP FOR WND CLSR DERMBND ADV

## (undated) DEVICE — DISPOSABLE TOURNIQUET CUFF SINGLE BLADDER, DUAL PORT AND QUICK CONNECT CONNECTOR: Brand: COLOR CUFF

## (undated) DEVICE — Device: Brand: STABLECUT®

## (undated) DEVICE — COVER,LIGHT,CAMERA,HARD,1/PK,STRL: Brand: MEDLINE

## (undated) DEVICE — WRAP THRP PLRCR500 BCK DRSG

## (undated) DEVICE — PLASTC TOOMEY SYRNG DISP

## (undated) DEVICE — PAD,ABDOMINAL,8"X7.5",ST,LF,20/BX: Brand: MEDLINE INDUSTRIES, INC.

## (undated) DEVICE — STOCK ORTH TUB 72X8IN NLTX

## (undated) DEVICE — TRAP 4 CPTR CHMBR N EZ INLN

## (undated) DEVICE — WRAP COOLING KNEE W/ICE PILLOW

## (undated) DEVICE — FLOSEAL SEALENT STERILE 10ML

## (undated) DEVICE — ANTIBACTERIAL UNDYED BRAIDED (POLYGLACTIN 910), SYNTHETIC ABSORBABLE SUTURE: Brand: COATED VICRYL

## (undated) DEVICE — Device

## (undated) DEVICE — PSI PSN PREF CR PIN GUIDE

## (undated) DEVICE — SUT PDS II 0 36IN CT-1 ABSRB VLT L36MM 1/2

## (undated) DEVICE — GAUZE SPONGES,12 PLY: Brand: CURITY

## (undated) DEVICE — DRAPE SHEET LG

## (undated) DEVICE — 3M™ MICROFOAM™ TAPE 1528-4: Brand: 3M™ MICROFOAM™

## (undated) DEVICE — DRESSING AQUACEL AG 3.5 X 10

## (undated) DEVICE — PROXIMATE SKIN STAPLERS (35 WIDE) CONTAINS 35 STAINLESS STEEL STAPLES (FIXED HEAD): Brand: PROXIMATE

## (undated) DEVICE — KENDALL SCD EXPRESS SLEEVES, KNEE LENGTH, MEDIUM: Brand: KENDALL SCD

## (undated) DEVICE — ANCHOR TISSUE RETRIEVAL SYSTEM, BAG SIZE 175 ML, PORT SIZE 10 MM: Brand: ANCHOR TISSUE RETRIEVAL SYSTEM

## (undated) DEVICE — ENDOSCOPY PACK UPPER: Brand: MEDLINE INDUSTRIES, INC.

## (undated) DEVICE — Device: Brand: DEFENDO AIR/WATER/SUCTION AND BIOPSY VALVE

## (undated) DEVICE — #15 STERILE STAINLESS BLADE: Brand: STERILE STAINLESS BLADES

## (undated) DEVICE — CUSHION INSERT PRONEVIEW LG

## (undated) DEVICE — SPECIMEN CONTAINER,POSITIVE SEAL INDICATOR, OR PACKAGED: Brand: PRECISION

## (undated) DEVICE — TROCAR: Brand: KII FIOS FIRST ENTRY

## (undated) DEVICE — SOL  .9 1000ML BTL

## (undated) DEVICE — UNIVERSAL STERIBUMP® STERILE (5/CASE): Brand: UNIVERSAL STERIBUMP®

## (undated) DEVICE — 1200CC GUARDIAN II: Brand: GUARDIAN

## (undated) DEVICE — SOL GLY 1.5 3000ML

## (undated) DEVICE — GLOVE SUR 7.5 SENSICARE NEOPR PWD F

## (undated) DEVICE — ROBOTIC GENERAL: Brand: MEDLINE INDUSTRIES, INC.

## (undated) DEVICE — STERILE HOOK LOCK LATEX FREE ELASTIC BANDAGE 4INX5YD: Brand: HOOK LOCK™

## (undated) DEVICE — INSULATED BLADE ELECTRODE 6.5

## (undated) DEVICE — DRAPE SRG 150X54IN LEICA

## (undated) DEVICE — SUTURE VICRYL 0 CT-1

## (undated) DEVICE — ARM DRAPE

## (undated) DEVICE — WIRE FIX .079IN KIRSCH: Type: IMPLANTABLE DEVICE | Site: SHOULDER

## (undated) DEVICE — LAMINECTOMY: Brand: MEDLINE INDUSTRIES, INC.

## (undated) DEVICE — URINE DRAINAGE BAG,BAG, NEEDLE SAMPLING, DRAIN TUBE: Brand: DOVER

## (undated) DEVICE — ENDOSCOPY PACK - LOWER: Brand: MEDLINE INDUSTRIES, INC.

## (undated) DEVICE — SLEEVE KENDALL SCD EXPRESS MED

## (undated) DEVICE — SYRINGE 30ML LL TIP

## (undated) DEVICE — SUTURE VICRYL 2-0 FSL

## (undated) DEVICE — CATH SECURING DEVICE STATLOCK

## (undated) DEVICE — INTENDED TO AID IN THE PASSING OF SUTURES THROUGH BONE AND SOFT TISSUE DURING ORTHOPEDIC SURGERY: Brand: HOFFEE SUTURE RETRIEVER

## (undated) DEVICE — GOWN,SIRUS,FABRIC-REINFORCED,X-LARGE: Brand: MEDLINE

## (undated) DEVICE — BOWL CEMENT MIX QUICK-VAC

## (undated) DEVICE — STERILE POLYISOPRENE POWDER-FREE SURGICAL GLOVES WITH EMOLLIENT COATING: Brand: PROTEXIS

## (undated) DEVICE — ZIMMER® STERILE DISPOSABLE TOURNIQUET CUFF WITH PLC, DUAL PORT, SINGLE BLADDER, 34 IN. (86 CM)

## (undated) DEVICE — SUTURE ETHIBOND 2 V-37

## (undated) DEVICE — CAP SEALING, REVEAL 15.0MM

## (undated) DEVICE — SOL H2O 1000ML BTL

## (undated) DEVICE — SUTURE CHROMIC GUT 0 CT-1

## (undated) DEVICE — 3M™ IOBAN™ 2 ANTIMICROBIAL INCISE DRAPE 6650EZ: Brand: IOBAN™ 2

## (undated) DEVICE — INSUFFLATION NEEDLE TO ESTABLISH PNEUMOPERITONEUM.: Brand: INSUFFLATION NEEDLE

## (undated) DEVICE — SUTURE VICRYL 2-0 CT-2

## (undated) DEVICE — 2T11 #2 PDO 36 X 36: Brand: 2T11 #2 PDO 36 X 36

## (undated) DEVICE — DAVINCI XI CLIP HEMO O LOK MEDIUM-LARGE GREEN

## (undated) DEVICE — LAPAROVUE VISIBILITY SYSTEM LAPAROSCOPIC SOLUTIONS: Brand: LAPAROVUE

## (undated) DEVICE — SUT VICRYL 2-0 FSL J589H

## (undated) DEVICE — GLOVE SRG BIOGEL 8.5

## (undated) DEVICE — BLADE ELECTRODE: Brand: EDGE

## (undated) DEVICE — E-Z BUTTON SWITCH PENCIL

## (undated) DEVICE — SUPER SPONGES,MEDIUM: Brand: KERLIX

## (undated) DEVICE — 3.0MM PRECISION NEURO (MATCH HEAD)

## (undated) DEVICE — STERILE LATEX POWDER-FREE SURGICAL GLOVES WITH HYDROGEL COATING, SMOOTH FINISH, STRAIGHT FINGER: Brand: PROTEXIS

## (undated) DEVICE — CYSTO CDS-LF: Brand: MEDLINE INDUSTRIES, INC.

## (undated) DEVICE — SOLUTION IV 1000ML DIL ST H2O

## (undated) DEVICE — 450 ML BOTTLE OF 0.05% CHLORHEXIDINE GLUCONATE IN 99.95% STERILE WATER FOR IRRIGATION, USP AND APPLICATOR.: Brand: IRRISEPT ANTIMICROBIAL WOUND LAVAGE

## (undated) DEVICE — STERILE LATEX POWDER-FREE SURGICAL GLOVESWITH NITRILE COATING: Brand: PROTEXIS

## (undated) DEVICE — SNARE 9MM 230CM 2.4MM EXACTO

## (undated) DEVICE — LIGHT HANDLE

## (undated) DEVICE — SUTURE MONOCRYL 3-0 Y936H

## (undated) DEVICE — 3M(TM) TEGADERM(TM) TRANSPARENT FILM DRESSING FRAME STYLE 1628: Brand: 3M™ TEGADERM™

## (undated) DEVICE — GLOVE SUR 7.5 SENSICARE PI PIP CRM PWD F

## (undated) DEVICE — 2, DISPOSABLE SUCTION/IRRIGATOR WITHOUT DISPOSABLE TIP: Brand: STRYKEFLOW

## (undated) DEVICE — SOLUTION IV 1000ML 0.9% NACL PRESERVATIVE

## (undated) DEVICE — FILTERLINE NASAL ADULT O2/CO2

## (undated) DEVICE — DRILL BIT TWIST 1/16 1.6MM

## (undated) DEVICE — 3M™ RED DOT™ MONITORING ELECTRODE WITH FOAM TAPE AND STICKY GEL, 50/BAG, 20/CASE, 72/PLT 2570: Brand: RED DOT™

## (undated) DEVICE — NEEDLE SPINAL 18X3-1/2 PINK.

## (undated) DEVICE — KIT TRC TRIMANO BEACH CHR ARM

## (undated) DEVICE — PREMIUM WET SKIN PREP TRAY: Brand: MEDLINE INDUSTRIES, INC.

## (undated) DEVICE — FORCEP RADIAL JAW 4

## (undated) DEVICE — SEAL

## (undated) DEVICE — SUTURE NABSB OTHCRD 2 OS-6

## (undated) DEVICE — DRESSING AQUACEL AG 3.5 X 6

## (undated) DEVICE — STERILE HOOK LOCK LATEX FREE ELASTIC BANDAGE 6INX5YD: Brand: HOOK LOCK™

## (undated) DEVICE — MONOPOLAR CURVED SCISSORS: Brand: ENDOWRIST

## (undated) DEVICE — TOTAL KNEE CDS: Brand: MEDLINE INDUSTRIES, INC.

## (undated) DEVICE — TROC SURG 3.2MM

## (undated) DEVICE — CAUTERY BLADE 2IN INS E1455

## (undated) DEVICE — WRAP COOLING SHLDR W/ICE PILLO

## (undated) DEVICE — POLAR CARE CUBE COOLING UNIT

## (undated) DEVICE — PROGRASP FORCEPS: Brand: ENDOWRIST

## (undated) DEVICE — BANDAGE ROLL,100% COTTON, 6 PLY, LARGE: Brand: KERLIX

## (undated) DEVICE — SUTURE PDS II 0 CT-1

## (undated) DEVICE — SUTURE FIBERWIRE 2 AR-7202

## (undated) DEVICE — SPECIMAN CONTAINER 4OZ STERILE

## (undated) DEVICE — TRAY CATH 16FR F INCL BARDX IC COMPLT CARE

## (undated) DEVICE — ABDOMINAL PAD: Brand: DERMACEA

## (undated) DEVICE — COLUMN DRAPE

## (undated) DEVICE — SUT MCRYL 4-0 18IN PS-2 ABSRB UD 19MM 3/8 CIR

## (undated) DEVICE — SOL  .9 3000ML

## (undated) DEVICE — HF-RESECTION ELECTRODE PLASMALOOP LOOP, LARGE, 24 FR., 12°/16°, ESG TURIS: Brand: OLYMPUS

## (undated) DEVICE — REM POLYHESIVE ADULT PATIENT RETURN ELECTRODE: Brand: VALLEYLAB

## (undated) DEVICE — ORTHO CDS-LF: Brand: MEDLINE INDUSTRIES, INC.

## (undated) DEVICE — COVER PSTN BW FRM SKN CR KT

## (undated) DEVICE — PAD THRP WRPON PLR LNG STRAP

## (undated) DEVICE — DRESSING AQUACEL AG 3.5X12

## (undated) DEVICE — TIP COVER ACCESSORY

## (undated) DEVICE — BANDAGE,GAUZE,BULKEE II,4.5"X4.1YD,STRL: Brand: MEDLINE

## (undated) DEVICE — SOL  .9 1000ML BAG

## (undated) DEVICE — FENESTRATED BIPOLAR FORCEPS: Brand: ENDOWRIST

## (undated) DEVICE — KIT DRLBT 2MM 3.2MM

## (undated) DEVICE — SOL NACL IRRIG 0.9% 1000ML BTL

## (undated) DEVICE — DAVINCI XI CLIP HEM O LOK LARGE PURPLE

## (undated) DEVICE — APPLICATOR CHLORAPREP 26ML

## (undated) DEVICE — BLADELESS OBTURATOR: Brand: WECK VISTA

## (undated) DEVICE — DECANTER BAG 9": Brand: MEDLINE INDUSTRIES, INC.

## (undated) NOTE — LETTER
Last Revised 02/07/06  Obstructive Sleep Apnea Questionnaire    Clinical signs and symptoms suggesting the possibility of JAGRUTI    1. Predisposing physical characteristics (positive with any of the following present)  ? BMI 35kg/m²  ?  Craniofacial abnormalit pauses which are frightening to the observer, patient regularly falls asleep within minutes after being left unstimulated) in which case they should be treated as though they have severe sleep apnea.     The sleep laboratory’s assessment (none, mild, modera Point Total for B           C. Requirement for postoperative opioids.                Opioid requirement             Points   None 0    Low dose oral opiod 1    High dose oral opioids, parenteral or neuraxial opiods 3      Point Total for C        Estimation

## (undated) NOTE — IP AVS SNAPSHOT
[facility] [place] [address] [facility], [address] ~ [phone]                Discharge Summary   3/24/2017    [facility]           Admission Information        Provider Department    3/24/2017 Dilan Monotya MD  4nw-A - reasons to take this  - additional instructions        1 tablet at start of gout attack . May repeat in 4-6 hours.  No greater than 2 pills daily    Ebenezer Poligeorge                           ergocalciferol 48814 units Caps   Commonly known as:  MORENO 2 sprays by Nasal route daily.     Jeffrey File                           Montelukast Sodium 10 MG Tabs   Last time this was given:  10 mg on 3/26/2017  9:13 AM   Commonly known as:  SINGULAIR   Next dose due:  3/27 am          TAKE 1 TABLET (Neurology) in the office if feels unable to safely drive or perform his work duties. Symptoms should continue to improve with time. Follow-up Information     Follow up with Rosemary Hollis MD In 1 month.     Specialty:  GASTROENTEROLOGY    Contact bill 22.0 (03/25/17)  8.8 (03/25/17)  2.0 (03/25/17)  0.6  (03/25/17)  3.27 (03/25/17)  1.10 (03/25/17)  0.44 (03/25/17)  0.10 (03/25/17)  0.03    (03/24/17)  69.2 (03/24/17)  18.3 (03/24/17)  9.9 (03/24/17)  1.0 (03/24/17)  0.4  (03/24/17)  5.35 (03/24/17)  1. discharge instructions in [facility] by going to Visits < Admission Summaries. If you've been to the Emergency Department or your doctor's office, you can view your past visit information in [facility] by going to Visits < Visit Summaries. [place] Cardiovascular questions? What to report to your healthcare team: Stomach upset, unresolved pain           GI Medications     Pantoprazole Sodium 40 MG Oral Tab EC    Acidophilus/Pectin Oral Cap       Use:  Nausea/vomiting, acid reflux, low bowel motility, stomach pain   Most commo

## (undated) NOTE — IP AVS SNAPSHOT
[address]            (For Outpatient Use Only) Initial Admit Date: 1/7/2020   Inpt/Obs Admit Date: Inpt: 1/7/20 / Obs: N/A   Discharge Date:    Isadora De Leon:  [de-identified]   MRN: [de-identified]   CSN: [id number]   CEID: [id number] Subscriber ID:  Pt Rel to Subscriber:    Hospital Account Financial Class: Managed Care    January 10, 2020

## (undated) NOTE — IP AVS SNAPSHOT
Patient Demographics     Address  [address] [place] [address] Phone  [phone] [facility])  [phone] (Mobile) *Preferred* E-mail Address  [email or web address]      Emergency Contact(s)     Name Relation Home Work Mobile    [place] ? Not allowed while taking narcotic pain medication or muscle relaxants. Sex  ? Usually allowed after four to six weeks – check with surgeon at your office visit. Return to work  ? Usually allowed after four to six weeks.  Discuss specific work ac ? Contact your physician if you have signs of bruising, nose bleeds or blood in your urine. Use electric razors and soft toothbrushes only. ? Do not take aspirin while taking blood thinners unless ordered by your physician. ?  Review anticoagulant educati ? Constipation is common with the use of narcotics. ? Eat fiber rich foods and drink plenty of fluids. ? Use stool softeners such as Colace or Senakot while on narcotics, and laxatives such as Miralax or Milk of Magnesia if needed. ?  An enema or suppos Notify your surgeon if you notice any  of the following signs  ? Separation of incision line. ? Increased redness, swelling, or warmth of skin around incision. ? Increased or foul smelling drainage from incision  ? Red streaks on skin near incision. ?  Richardson Goodpasture in and out of your car safely; some parking spaces are  practically on top of each other and do not give you enough room. SPECIAL  INSTRUCTIONS:          View knee discharge education video at [email or web address].   Choose after surgery BETZY MONTENEGRO         cloNIDine HCl 0.2 MG Tabs  Commonly known as:  CATAPRES      Take 1 tablet (0.2 mg total) by mouth 2 (two) times daily. Renny Smith NP         colchicine 0.6 MG Tabs      1 tablet at start of gout attack .  May repeat in 4-6 ho and mask. .90 Oscar Pollack MD         Nebulizer/Tubing/Mouthpiece Kit      As directed   Demetrius Quintanilla MD         OPTISOURCE POST BARIATRIC SURG Chew  Next dose due:  TOMORROW AM      Chew 2 tablets by mouth daily.           Archaris Edu [id number] cloNIDine HCl (CATAPRES) tab 0.2 mg 01/10/20 0811 Given      [id number] docusate sodium (COLACE) cap 100 mg 01/09/20 1955 Given      [id number] docusate sodium (COLACE) cap 100 mg 01/10/20 0811 Given      [id number] ferrous sulfate EC tab 325 mg 01 BUN/CREA Ratio 16.1 10.0 - 20.0 — [facility]   Calcium, Total 9.0 8.5 - 10.1 mg/dL [facility]   Calculated Osmolality 289 275 - 295 mOsm/kg — [facility]   GFR, Non- 70 >=60 — [facility]   GFR, -American 81 >=60 — [employer] • Glucosamine HCl (GLUCOSAMINE OR) Take 2 tablets by mouth daily. • Cholecalciferol (VITAMIN D3 OR) Take 1 tablet by mouth daily. • aspirin 81 MG Oral Tab Take 81 mg by mouth daily.      • tiZANidine HCl 4 MG Oral Tab Take 1 tablet (4 mg total) by ointment to each nostril twice daily. Press nostrils together to distribute ointment. Start 5 days prior to day of surgery. Last application morning of surgery.  22 g 0   • EPINEPHrine (EPIPEN 2-PENNY) 0.3 MG/0.3ML Injection Solution Auto-injector Use as dire Smokeless tobacco: Never Used    Alcohol use:  Yes      Alcohol/week: 7.0 - 14.0 standard drinks      Types: 7 - 14 Standard drinks or equivalent per week      Frequency: 2-3 times a week      Drinks per session: 1 or 2      Binge frequency: Never    Dr Scheduled for elective right total knee replacement, limitations, risk, complications are discussed. Possibility of continued pain, stiffness, neurovascular compromise, incomplete resolution of symptoms all dialogued.   He is well counseled desires care no • Complete tear of right rotator cuff 10/18/2018    10.2018 mri   • Diverticulosis of large intestine    • Elevated PSA     neg prostate biopsy 2012   • Esophageal reflux    • Extrinsic asthma, unspecified     ALLERGY INDUCED  ASTHMA   • Gout    • Gout Performed by Susie Beth MD at [facility] [address] DISCECTOMY 1 LEVEL N/A 2/1/2018    Performed by Bennie Daniels MD at [facility] MAIN OR   • MASTOIDECTOMY,SIMPLE      mastoid surgery   • NASAL SEPTOPLASTY WITH SUBMUCOUS RE • Montelukast Sodium  10 mg Oral Daily[SL. 2]       Continuous Infusions:      Social History:[SL.1]   reports that he quit smoking about 21 years ago. He has a 30.00 pack-year smoking history.  He has never used smokeless tobacco. He reports current alcohol 3. S/p right shoulder replacement, left one 6/19.  4. Morbid obesity, prior gastric bypass. 5. Frequent ventricular ectopy. Had been on phentermine, since stopped. ASx. Decreased on holter 2/19 on 100 mg of Toprol. Last echo 10/16/19 - preserved EF. • Cancer ([facility] [address]     basal cell skin   • Complete tear of right rotator cuff 10/18/2018    10.2018 mri   • Diverticulosis of large intestine    • Elevated PSA     neg prostate biopsy 2012   • Esophageal reflux    • Extrinsic asthma, unspecified     ALLERGY I Performed by Binta Dunbar MD at [facility] 11/16/2017    Performed by Binta Dunbar MD at [facility] DISCECTOMY 1 LEVEL N/A 2/1/2018    Performed by Bryan Chang, -   Sitting down on and standing up from a chair with arms (e.g., wheelchair, bedside commode, etc.): A Little   -   Moving from lying on back to sitting on the side of the bed?: A Little   How much help from another person does the patient currently need. R Knee Extension (degrees): 13       Patient End of Session: Up in chair;Needs met;Call light within reach;RN aware of session/findings; All patient questions and concerns addressed; Family present    ASSESSMENT   Pt continues to present with impaired streng Author:  Leif Pritchard PTA Service:  Rehab Author Type:  Physical Therapy Assistant    Filed:  1/9/2020 12:16 PM Date of Service:  1/9/2020  8:54 AM Status:  Signed    :  Leif Pritchard PTA (Physical Therapy Assistant)        PHYSICAL T Performed by Rebecca Farooq MD at [facility] ENDOSCOPY   • COLONOSCOPY, POSSIBLE BIOPSY, POSSIBLE POLYPECTOMY [address] N/A 7/3/2018    Performed by Rebecca Farooq MD at [address] [place] [address]   • DEBRIDE MASTOID CAVITY,SIMPLE     • [facility] GRAFT N/A 1/11 \"I could stand to miss a few breakfasts\"     Patient’s self-stated goal is to get his other knee replaced so he can play with his grandchild he is expecting soon     OBJECTIVE  Precautions: Knee immobilizer    WEIGHT BEARING STATUS  Weight Bearing Restri LAQ RLE seated EOB. Sit>stand mod A x 2, cues for hand placement. Mod A initially for standing static bal.  Pt weight shifts R to L with no knee buckle noted, pt able to side step toward HOB c min A x 2 and RW no knee buckle noted.   Pt t/f bed >chair c RW will continue to benefit from ongoing IP PT to maximize functional independence. Pt was able to progress mobility this session, however, remains hypertensive, RN aware.      The AM-PAC '6-Clicks' Inpatient Basic Mobility Short Form was completed and this pa Room Number: 379/379-A  Evaluation Date: 1/8/2020  Type of Evaluation: Initial  Physician Order: PT Eval and Treat    Presenting Problem: (s/p right TKR )  Reason for Therapy: Mobility Dysfunction and Discharge Planning    History related to current admiss • EAR CARTILAGE HARVEST GRAFT N/A 1/11/2011    Performed by Kaylen Simmons at [address] [place] [address]   • ESOPHAGOGASTRODUODENOSCOPY (EGD) N/A 3/24/2017    Performed by Kamari Ellis MD at [facility] ENDOSCOPY   • EXCISION OF ARM MASS Right 7/2/2014    Perform Patient Regularly Uses: Reading glasses    Prior Level of [place]: Amb without assistive device, sleep in recliner chair. Amb without AD, but does have walkers, cane from prior surgeries. SUBJECTIVE  I have a dull headache.     Knee pain is bad, a -   Sitting down on and standing up from a chair with arms (e.g., wheelchair, bedside commode, etc.): A Lot   -   Moving from lying on back to sitting on the side of the bed?: A Lot   How much help from another person does the patient currently need. ..    - bedside treatment to help facilitate improvement.     Exercise/Education Provided:  Bed mobility  Body mechanics  Energy conservation  Functional activity tolerated  Gait training  Manual therapy  Neuromuscular re-educate  Posture  ROM  Strengthening  Lower his BP is high and was unable to participate in therapy both bedside and as group.   .  Functional outcome measures completed include The AM-PAC '6-Clicks' Inpatient Basic Mobility Short Form was completed and this patient is demonstrating a 81% degree of i SHANNON.1 Barry Simmons, PT on 1/8/2020  2:21 PM  SHANNON. 2 - Brian Dacosta, PT on 1/8/2020  2:42 PM               Physical Therapy Note signed by Hellen Reynolds PTA at 1/8/2020  1:02 PM  Version 3 of 3    Author:  Hellen Reynolds PTA Service:  Rehab Au Author:  Lauren Gilmore PTA Service:  Rehab Author Type:  Physical Therapy Assistant    Filed:  1/8/2020 12:13 PM Date of Service:  1/8/2020 12:08 PM Status:  Signed    :  Lauren Gilmore PTA (Physical Therapy Assistant)    Related Notes: • Extrinsic asthma, unspecified     ALLERGY INDUCED  ASTHMA   • Gout    • Gout    • High blood pressure    • Insulin resistance 7/18/2018   • Nystagmus     SINCE CHILDHOOD   • OBESITY     Gastric bypass 2006   • JAGRUTI (obstructive sleep apnea) PSG 2/2/16 [place] Performed by Jason Ocampo MD at [facility] OR   • MASTOIDECTOMY,SIMPLE      mastoid surgery   • NASAL SEPTOPLASTY WITH SUBMUCOUS RESECTION INFERIOR Bilateral 6/23/2010    Performed by Luis Daniel Winslow MD at [facility] Approx Degree of Impairment: 38.32%  Standardized Score (AM-PAC Scale): 42.03  CMS Modifier (G-Code): CJ[AO.2]    FUNCTIONAL TRANSFER ASSESSMENT[AO.1]  Supine to Sit : Minimum assistance  Sit to Stand: Contact guard assist[AO.2]    Skilled Therapy Provided OT Device Recommendations: Other (Comment)(bariatric walker)[AO.2]    PLAN[AO.1]  OT Treatment Plan: Balance activities; Energy conservation/work simplification techniques;ADL training;Functional transfer training; Endurance training;Patient/Family education • Allergic rhinitis, cause unspecified    • Arrhythmia     PHENTERMINE CAUSED PALPITATIONS   • Asthma    • Back problem     SCOLIOSIS, AND REPAIRED HERNIATED DISC   • BPH (benign prostatic hyperplasia)    • Cancer (HCC)     basal cell skin   • Complete tea Performed by Alcon Molina MD at [place] [address] N/A 12/27/2017    Performed by Binta Dunbar MD at [address] N/A 12/4/2017    Performed by Binta Dunbar MD at [address] [place] [address] WEIGHT BEARING RESTRICTION[MM. 1]  Weight Bearing Restriction: R lower extremity        R Lower Extremity: Weight Bearing as Tolerated[MM. 2]       PAIN ASSESSMENT[MM. 1]  Ratin  Location: (R knee)  Management Techniques: Relaxation;Repositioning[MM. 2] Skilled Therapy Provided:[MM.1]   Patient in supine, spouse present, Education given on OT goals. Patient nto able to perform SLR, aide called to bring wide RW and KI to room. Patient transferred supine to sit with min A. Increased R knee pain noted.  OT a The AM-BJ ' '6-Clicks' Inpatient Daily Activity Short Form was completed and  this patient  is demonstrating a[MM.3]  50[MM.2]% degree impairment in activities of daily living.  Research supports that patients with this level of impairment often benefit fr MM.3 - Elzbieta Elam, OT on 1/8/2020 12:50 PM  MM. 4 - Elzbieta Elam, OT on 1/8/2020 12:58 PM  MM. 5 - Elzbieta Elam, OT on 1/8/2020  1:00 PM                     Video Swallow Study Notes    No notes of this type exist for this encounter.      SLP IN [facility]    2/12/2020 10:00 AM Yumiko Campos NP [facility]    3/25/2020 7:45 AM Nhung Chen MD [address] [facility]    3/25/2020 10:30 AM Ann-Marie Leon MD [facility] INTERNAL MEDICINE - [facility],

## (undated) NOTE — LETTER
[facility] Testing Department  Phone: [phone]  Right Fax: [phone]  ABNORMAL VALUES FAX    Sent By:  Fermin Jensen RN Date: 12/26/19    Patient Name: Kemal Ricehire  Surgery Date: 1/7/2020    CSN: [id number]  Medical Record: [id number]

## (undated) NOTE — LETTER
7/20/2021          [id number]    Dear Ramiro Amezquita,       Here are the biopsy/pathology findings from your recent EGD (upper endoscopy) and colonoscopy:     The biopsy/pathology findings from your upper endoscopy

## (undated) NOTE — LETTER
[address] [facility] [address]  [facility], [facility]    Consent for Operation  Date: __________________                                Time: _______________    1.  I authorize the performance upon Varinder Stapleton the following operation:  Procedisiah procedure has been videotaped, the surgeon will obtain the original videotape. The hospital will not be responsible for storage or maintenance of this tape.     6. For the purpose of advancing medical education, I consent to the admittance of observers to t STATEMENTS REQUIRING INSERTION OR COMPLETION WERE FILLED IN.     Signature of Patient:   ___________________________    When the patient is a minor or mentally incompetent to give consent:  Signature of person authorized to consent for patient: ____________

## (undated) NOTE — ED AVS SNAPSHOT
[facility] Emergency Department    [place]  [address] [address]    Phone:  [phone]    Fax:  [phone]           Sandra Reynolds   MRN: [id number]    Department:  [facility] Emergency Department   Date of Visit:  4/3 IF THERE IS ANY CHANGE OR WORSENING OF YOUR CONDITION, CALL YOUR PRIMARY CARE PHYSICIAN AT ONCE OR RETURN IMMEDIATELY TO THE EMERGENCY DEPARTMENT.     If you have been prescribed any medication(s), please fill your prescription right away and begin taking t

## (undated) NOTE — LETTER
OUTSIDE TESTING RESULT REQUEST     IMPORTANT: FOR YOUR IMMEDIATE ATTENTION  Please FAX all test results listed below to: [phone]     Testing already done on or about: 2023     * * * * If testing is NOT complete, arrange with patient A.S.A.P. * * * *      Patient Name: Freya Rivera  Surgery Date: 2023  Medical Record: [id number]  CSN: [id number]  : 1960 - A: 70 y     Sex: male  Surgeon(s):  Osman Novoa MD  Procedure: LEFT TOTAL KNEE ARTHROPLASTY  Anesthesia Type: Choice     Surgeon: Osman Novoa MD     The following Testing and Time Line are REQUIRED PER ANESTHESIA     EKG READ AND SIGNED WITHIN   90 days      Thank You,   Sent by: Lam Keenan RN

## (undated) NOTE — LETTER
OUTSIDE TESTING RESULT REQUEST     IMPORTANT: FOR YOUR IMMEDIATE ATTENTION  Please FAX all test results listed below to: [phone]     Testing already done on or about: 23     * * * * If testing is NOT complete, arrange with patient A.S.A.P. * * * *      Patient Name: Fátima Lui  Surgery Date: 2023  Medical Record: [id number]  CSN: [id number]  : 1960 - A: 70 y     Sex: male  Surgeon(s):  Boni Saenz MD  Procedure: LEFT TOTAL KNEE ARTHROPLASTY  Anesthesia Type: Choice     Surgeon: Boni Saenz MD     The following Testing and Time Line are REQUIRED PER ANESTHESIA     EKG READ AND SIGNED WITHIN   90 days      Thank You,   Sent by:Summer Beltre RN

## (undated) NOTE — ED AVS SNAPSHOT
[facility] Emergency Department    [facility]  [address] [address]    Phone:  [phone]    Fax:  [phone]           Rhonda Linda   MRN: [id number]    Department:  [facility] Emergency Department   Date of Visit:  4/3 Follow-up with your doctor return if any severe headache, nausea, vomiting, dizziness, lightheadedness.     Discharge References/Attachments     HEAD INJURY (ADULT) (ENGLISH)      Disclosure     Insurance plans vary and the physician(s) referred by the ER m CARE PHYSICIAN AT ONCE OR RETURN IMMEDIATELY TO THE EMERGENCY DEPARTMENT.     If you have been prescribed any medication(s), please fill your prescription right away and begin taking the medication(s) as directed    If the emergency physician has read X-ray coverage. Patient [address] is a Federal Navigator program that can help with your Affordable Care Act coverage, as well as all types of Medicaid plans.   To get signed up and covered, please call [phone] and ask to get set up for an insuran SKULL:             No evidence for fracture or osseous abnormality. OTHER:             Atherosclerosis.                XR CHEST PA + LAT CHEST (CPT=71020) (Final result) Result time:  04/03/17 16:23:17    Final result    Impression:    CONCLUSION:  No f